# Patient Record
Sex: FEMALE | Race: WHITE | NOT HISPANIC OR LATINO | Employment: UNEMPLOYED | URBAN - METROPOLITAN AREA
[De-identification: names, ages, dates, MRNs, and addresses within clinical notes are randomized per-mention and may not be internally consistent; named-entity substitution may affect disease eponyms.]

---

## 2017-02-07 ENCOUNTER — TRANSFERRED RECORDS (OUTPATIENT)
Dept: HEALTH INFORMATION MANAGEMENT | Facility: CLINIC | Age: 32
End: 2017-02-07

## 2017-02-14 ENCOUNTER — RADIANT APPOINTMENT (OUTPATIENT)
Dept: ULTRASOUND IMAGING | Facility: CLINIC | Age: 32
End: 2017-02-14
Payer: COMMERCIAL

## 2017-02-14 ENCOUNTER — PRENATAL OFFICE VISIT (OUTPATIENT)
Dept: MIDWIFE SERVICES | Facility: CLINIC | Age: 32
End: 2017-02-14
Payer: COMMERCIAL

## 2017-02-14 VITALS
HEART RATE: 70 BPM | WEIGHT: 220.2 LBS | SYSTOLIC BLOOD PRESSURE: 121 MMHG | BODY MASS INDEX: 34.56 KG/M2 | DIASTOLIC BLOOD PRESSURE: 81 MMHG | HEIGHT: 67 IN

## 2017-02-14 DIAGNOSIS — O09.01 ENCOUNTER FOR SUPERVISION OF HIGH-RISK PREGNANCY WITH HISTORY OF INFERTILITY IN FIRST TRIMESTER: ICD-10-CM

## 2017-02-14 DIAGNOSIS — O36.80X0 ENCOUNTER TO DETERMINE FETAL VIABILITY OF PREGNANCY, NOT APPLICABLE OR UNSPECIFIED FETUS: ICD-10-CM

## 2017-02-14 DIAGNOSIS — O09.00: Primary | ICD-10-CM

## 2017-02-14 PROBLEM — O09.91 SUPERVISION OF HIGH RISK PREGNANCY IN FIRST TRIMESTER: Status: ACTIVE | Noted: 2017-02-14

## 2017-02-14 PROBLEM — Z34.01 ENCOUNTER FOR SUPERVISION OF NORMAL FIRST PREGNANCY IN FIRST TRIMESTER: Status: ACTIVE | Noted: 2017-02-14

## 2017-02-14 LAB
ALBUMIN UR-MCNC: NEGATIVE MG/DL
APPEARANCE UR: CLEAR
BACTERIA #/AREA URNS HPF: ABNORMAL /HPF
BASOPHILS # BLD AUTO: 0 10E9/L (ref 0–0.2)
BASOPHILS NFR BLD AUTO: 0.2 %
BILIRUB UR QL STRIP: NEGATIVE
COLOR UR AUTO: YELLOW
DIFFERENTIAL METHOD BLD: ABNORMAL
EOSINOPHIL # BLD AUTO: 0.3 10E9/L (ref 0–0.7)
EOSINOPHIL NFR BLD AUTO: 2.1 %
ERYTHROCYTE [DISTWIDTH] IN BLOOD BY AUTOMATED COUNT: 13.8 % (ref 10–15)
GLUCOSE UR STRIP-MCNC: NEGATIVE MG/DL
HCT VFR BLD AUTO: 41.9 % (ref 35–47)
HGB BLD-MCNC: 14.4 G/DL (ref 11.7–15.7)
HGB UR QL STRIP: ABNORMAL
KETONES UR STRIP-MCNC: NEGATIVE MG/DL
LEUKOCYTE ESTERASE UR QL STRIP: ABNORMAL
LYMPHOCYTES # BLD AUTO: 2.3 10E9/L (ref 0.8–5.3)
LYMPHOCYTES NFR BLD AUTO: 19.3 %
MCH RBC QN AUTO: 30.3 PG (ref 26.5–33)
MCHC RBC AUTO-ENTMCNC: 34.4 G/DL (ref 31.5–36.5)
MCV RBC AUTO: 88 FL (ref 78–100)
MICRO REPORT STATUS: NORMAL
MONOCYTES # BLD AUTO: 0.9 10E9/L (ref 0–1.3)
MONOCYTES NFR BLD AUTO: 7.3 %
NEUTROPHILS # BLD AUTO: 8.4 10E9/L (ref 1.6–8.3)
NEUTROPHILS NFR BLD AUTO: 71.1 %
NITRATE UR QL: NEGATIVE
NON-SQ EPI CELLS #/AREA URNS LPF: ABNORMAL /LPF
PH UR STRIP: 6 PH (ref 5–7)
PLATELET # BLD AUTO: 267 10E9/L (ref 150–450)
RBC # BLD AUTO: 4.76 10E12/L (ref 3.8–5.2)
RBC #/AREA URNS AUTO: ABNORMAL /HPF (ref 0–2)
SP GR UR STRIP: 1.02 (ref 1–1.03)
SPECIMEN SOURCE: NORMAL
URN SPEC COLLECT METH UR: ABNORMAL
UROBILINOGEN UR STRIP-ACNC: 0.2 EU/DL (ref 0.2–1)
WBC # BLD AUTO: 11.8 10E9/L (ref 4–11)
WBC #/AREA URNS AUTO: ABNORMAL /HPF (ref 0–2)
WET PREP SPEC: NORMAL

## 2017-02-14 PROCEDURE — 87086 URINE CULTURE/COLONY COUNT: CPT | Performed by: ADVANCED PRACTICE MIDWIFE

## 2017-02-14 PROCEDURE — 81001 URINALYSIS AUTO W/SCOPE: CPT | Performed by: ADVANCED PRACTICE MIDWIFE

## 2017-02-14 PROCEDURE — 87491 CHLMYD TRACH DNA AMP PROBE: CPT | Performed by: ADVANCED PRACTICE MIDWIFE

## 2017-02-14 PROCEDURE — 86780 TREPONEMA PALLIDUM: CPT | Performed by: ADVANCED PRACTICE MIDWIFE

## 2017-02-14 PROCEDURE — 83036 HEMOGLOBIN GLYCOSYLATED A1C: CPT | Performed by: ADVANCED PRACTICE MIDWIFE

## 2017-02-14 PROCEDURE — 76817 TRANSVAGINAL US OBSTETRIC: CPT | Performed by: OBSTETRICS & GYNECOLOGY

## 2017-02-14 PROCEDURE — 87210 SMEAR WET MOUNT SALINE/INK: CPT | Performed by: ADVANCED PRACTICE MIDWIFE

## 2017-02-14 PROCEDURE — 85025 COMPLETE CBC W/AUTO DIFF WBC: CPT | Performed by: ADVANCED PRACTICE MIDWIFE

## 2017-02-14 PROCEDURE — 87340 HEPATITIS B SURFACE AG IA: CPT | Performed by: ADVANCED PRACTICE MIDWIFE

## 2017-02-14 PROCEDURE — 86900 BLOOD TYPING SEROLOGIC ABO: CPT | Performed by: ADVANCED PRACTICE MIDWIFE

## 2017-02-14 PROCEDURE — 86901 BLOOD TYPING SEROLOGIC RH(D): CPT | Performed by: ADVANCED PRACTICE MIDWIFE

## 2017-02-14 PROCEDURE — 87389 HIV-1 AG W/HIV-1&-2 AB AG IA: CPT | Performed by: ADVANCED PRACTICE MIDWIFE

## 2017-02-14 PROCEDURE — 40000968 ZZHCL STATISTIC ZIKA VIRUS: Mod: 90 | Performed by: ADVANCED PRACTICE MIDWIFE

## 2017-02-14 PROCEDURE — 82306 VITAMIN D 25 HYDROXY: CPT | Performed by: ADVANCED PRACTICE MIDWIFE

## 2017-02-14 PROCEDURE — 86762 RUBELLA ANTIBODY: CPT | Performed by: ADVANCED PRACTICE MIDWIFE

## 2017-02-14 PROCEDURE — 36415 COLL VENOUS BLD VENIPUNCTURE: CPT | Performed by: ADVANCED PRACTICE MIDWIFE

## 2017-02-14 PROCEDURE — 86850 RBC ANTIBODY SCREEN: CPT | Performed by: ADVANCED PRACTICE MIDWIFE

## 2017-02-14 PROCEDURE — 99000 SPECIMEN HANDLING OFFICE-LAB: CPT | Performed by: ADVANCED PRACTICE MIDWIFE

## 2017-02-14 PROCEDURE — 87591 N.GONORRHOEAE DNA AMP PROB: CPT | Performed by: ADVANCED PRACTICE MIDWIFE

## 2017-02-14 PROCEDURE — 84443 ASSAY THYROID STIM HORMONE: CPT | Performed by: ADVANCED PRACTICE MIDWIFE

## 2017-02-14 PROCEDURE — 99207 ZZC PRENATAL VISIT: CPT | Performed by: ADVANCED PRACTICE MIDWIFE

## 2017-02-14 RX ORDER — PROGESTERONE 50 MG/ML
INJECTION, SOLUTION INTRAMUSCULAR DAILY
COMMUNITY
End: 2017-03-17

## 2017-02-14 ASSESSMENT — ANXIETY QUESTIONNAIRES
IF YOU CHECKED OFF ANY PROBLEMS ON THIS QUESTIONNAIRE, HOW DIFFICULT HAVE THESE PROBLEMS MADE IT FOR YOU TO DO YOUR WORK, TAKE CARE OF THINGS AT HOME, OR GET ALONG WITH OTHER PEOPLE: SOMEWHAT DIFFICULT
3. WORRYING TOO MUCH ABOUT DIFFERENT THINGS: NOT AT ALL
1. FEELING NERVOUS, ANXIOUS, OR ON EDGE: SEVERAL DAYS
5. BEING SO RESTLESS THAT IT IS HARD TO SIT STILL: NOT AT ALL
6. BECOMING EASILY ANNOYED OR IRRITABLE: SEVERAL DAYS
7. FEELING AFRAID AS IF SOMETHING AWFUL MIGHT HAPPEN: NOT AT ALL
GAD7 TOTAL SCORE: 2
2. NOT BEING ABLE TO STOP OR CONTROL WORRYING: NOT AT ALL

## 2017-02-14 ASSESSMENT — PATIENT HEALTH QUESTIONNAIRE - PHQ9: 5. POOR APPETITE OR OVEREATING: NOT AT ALL

## 2017-02-14 NOTE — PATIENT INSTRUCTIONS
Remedies for nausea and vomiting with pregnancy      Eat small frequent meals every 2-3 hours if possible.       Avoid food at extremes of temperature and drinks with carbonation.      Eat foods that appeal to you, avoiding fats and spicy foods.      Avoid liquids with foods.  Drink liquids 30-60 minutes before or after eating and sip slowly.      Bread, pasta, crackers, potatoes, and rice tend to be tolerated the best.      Don't worry about what you eat in the first 3 months, it is more important that you can eat and keep it down.       Try flat ginger ale or ginger tea      Before rising in the morning, eat a small amount of crackers or dry toast.      Peppermint tea      Ginger is a herbal remedy for nausea and you can use it in any form.  There are ginger tablets you can purchase.  The dose 1000 mg a day in divided doses.       You may also try doxylamine (Unisom) 12.5 mg three times a day which is a sleeping medication along with Vitamin B6 25 mg three times a day.  This combination takes up to a week to work so give it some time.       Benadryl (diphenhydramine) 25-50 mg every 8 hour or Dramamine (dimenhydrinate)  mg by mouth every 4-6 hours. Both of these medication may cause some drowsiness      Other things that may help include an Accupressure band and acupuncture      If these methods fail there are many prescriptions that we can try    If you begin to vomit more than 5 or 6 times a day and feel that you are unable to keep anything down, call the [facility] at [phone]    Over-the-counter (OTC) medications during pregnancy    Make sure to follow package directions for dosing and information unless otherwise noted on the list.    Morning sickness/nausea:      Unisom (doxylamine) 12.5 mg (1/2 tab) and Vitamin B6 25-50 mg three times daily. Unisom may cause some drowsiness as it is typically used for sleep. The combination of these medications can be very effective but they can also  be taken separately    Dramamine (Dimenhydrinate) 25-50 mg every 4-6 hours as needed    Benadryl (diphenhydramine) 25-50 mg every 4-6 hours     Ginger tablets 1000 mg per day in divided doses    Constipation:      Colace (Docusate sodium)    Metamucil    Citrucel    Milk of magnesia    Fibercon    Miralax (if all other methods have failed)    Diarrhea:    Imodium (loperamide)    Heartburn:      Zantac (ranitidine)    Pepcid (famotidine)    Prilosec (omeprazole)    Antacids-Tums, Maalox (liquid or tablets), Rolaids  Pepto Bismol and Jayda Bairoil are NOT RECOMMENDED for use during pregnancy because they contain aspirin    Hemorrhoids:      Tucks pads/ointment    Anusol/Anusol-HC    Preparation H    Gas Pain  Simethicone (Gas-X, Mylanta Gas, Mylicon)      Cough, cold, and congestion:      Robitussin (dextromethorphan) and Robitussin DM (dextromethorphan and guaifenesin)    Cough drops/zinc lozenges    Mucinex    Sudafed (after 16 weeks gestation)    Vicks Vapo rub  Cough medicine with alcohol like Nyquil is not recommended during pregnancy    Headache:      Acetaminophen (Tylenol) 650 mg every 4-6 hours or 1000 mg every 6 hours, do not exceed 4000 mg in 24 hours   Ibuprofen (Advil/Motrin) and Naproxen (Aleve) are not recommended during the 1st or 3rd trimester of pregnancy    Allergy:      Benadryl (diphenhydramine)    Zyrtec (cetirizine)    Claritin (loratadine)    Rash/Itching:      Benadryl lotion    Cortaid cream (Hydrocortisone cream)    Benadryl (diphenhydramine)    Vaginal yeast infection:      Monistat 3 or 7 day    Gyne-Lotrimin    Acne:      Benzoyl Peroxide    Salicylic acid     If you have questions or concerns about any medications that are available OTC or you are unsure if something is safe call:    [facility]  [phone]      .  Fish Safety During Pregnancy    Often time's women worry about eating fish during pregnancy. Fish can be totally safe to eat during pregnancy.However, some  fish may contain contaminants that could harm you or your baby if you eat certain types of fish or eat fish too often. Below is a list of which types of fish to eat, how often to eat fish, and which types of fish to avoid while pregnant.    Reasons to eat fish:    Fish is a great source of protein, vitamins, and minerals.    The oils found in fish are important to unborn and breast fed babies    Eating fish may play a role in the prevention of heart disease in adults       Fish to EAT while pregnant   2 servings per week of any of these types of fish    Catfish (farm raised)  Cod    Crab    Soto    Flatfish   Oysters    [place]   [place]     Sardines   Scallops    Shrimp   Tilapia   1 serving per week of any of these fish    Canned LIGHT tuna     [place] caught-        Stu Pattersonppie   Mesa    Yellow Perch   1 serving per MONTH of these types of fish    Canned WHITE tuna  T[place]wanese Sea Brown    Grouper   Halibdomi Tapialin    [place]ers Roughy    Tuna steak   [place] caught-    Bass    Catfish    Walleye smaller than 20 inches    [place] smaller than 30 inches         Servings of fish should be based on your weight, 1 oz for every 20 lbs of body weight. For example: 130 lb person can safely eat 7 oz     150 lb person can safely eat 8 oz     170 lb person can safely eat 9 oz      Make sure to space out meals with fish throughout the month, don't eat all your fish meals for the month within a few days.       Fish to Avoid while Pregnant:      Shark   Dane Mackerel    Swordfish  Raw Sushi-any type of fish    Tile fish         [place] Caught:      Walleye longer than 20 in    [place] longer than 30 in    Musky      Contaminants:      Mercury comes from air pollution and small amounts of mercury can damage the brain that is just starting to form or grow. Too much mercury may affect a child's behavior and lead to learning problems later in life. Too much mercury in adults and older  children may cause tingling, numbness in hands and feet, or vision changes    PCBs a man-made substance once used in electrical transformers but was banned in 1967 although it can still be found in the [place]. A baby who are exposed to PCBs during pregnancy may have a lower birth weight, reduced head size, and delayed physical development. Exposure to PCBs may also cause cancer    PFOS is a man-made chemical to make products that resist heat, oil, stains, and grease. Studies in lab animals exposed to low levels of PFOS showed decreas HDL (good cholesterol) and changes in thyroid hormone levels. The concern about PFOS is with long-term exposure such as consuming large amounts over a long period of time     Mercury and PFOS cannot be removed through cooking or cleaning. By removing fat when cleaning and cooking you can reduce PCBs.      For more information and up to date information about fish safety in [place] please contact the [place] Department of Health ([facility]) or the Department of Natural Resources (DNR):    www.[email or web address].[facility].mn.  DNR: [phone]  [facility]: [phone]      Recommendations for total and rate of weight gain during pregnancy, by prepregnancy BMI    Total weight gain Rates of weight gain*  2nd and 3rd trimester   Prepregnancy BMI Range in kg Range in lbs Mean (range) in kg/week Mean (range) in lbs/week   Underweight (<18.5 kg/m2) 12.5-18 28-40 0.51 (0.44-0.58) 1 (1-1.3)   Normal weight (18.5-24.9 kg/m2) 11.5-16 25-35 0.42 (0.35-0.50) 1 (0.8-1)   Overweight (25.0-29.9 kg/m2) 7-11.5 15-25 0.28 (0.23-0.33) 0.6 (0.5-0.7)   Obese (?30.0 kg/m2) 5-9 11-20 0.22 (0.17-0.27) 0.5 (0.4-0.6)   BMI: body mass index.  * Calculations assume a 0.5-2 kg (1.1-4.4 lbs) weight gain in the first trimester.  * To calculate BMI go to [email or web address]

## 2017-02-14 NOTE — MR AVS SNAPSHOT
After Visit Summary   2/14/2017    Nhi Alexander    MRN: [id number]           Patient Information     Date Of Birth          1985        Visit Information        Provider Department      2/14/2017 2:00 PM Cheyenne Abernathy APRN CNM; [facility]        Today's Diagnoses     Encounter for supervision of high-risk pregnancy with history of infertility    -  1    Encounter for supervision of high-risk pregnancy with history of infertility in first trimester          Care Instructions    Remedies for nausea and vomiting with pregnancy      Eat small frequent meals every 2-3 hours if possible.       Avoid food at extremes of temperature and drinks with carbonation.      Eat foods that appeal to you, avoiding fats and spicy foods.      Avoid liquids with foods.  Drink liquids 30-60 minutes before or after eating and sip slowly.      Bread, pasta, crackers, potatoes, and rice tend to be tolerated the best.      Don't worry about what you eat in the first 3 months, it is more important that you can eat and keep it down.       Try flat ginger ale or ginger tea      Before rising in the morning, eat a small amount of crackers or dry toast.      Peppermint tea      Ginger is a herbal remedy for nausea and you can use it in any form.  There are ginger tablets you can purchase.  The dose 1000 mg a day in divided doses.       You may also try doxylamine (Unisom) 12.5 mg three times a day which is a sleeping medication along with Vitamin B6 25 mg three times a day.  This combination takes up to a week to work so give it some time.       Benadryl (diphenhydramine) 25-50 mg every 8 hour or Dramamine (dimenhydrinate)  mg by mouth every 4-6 hours. Both of these medication may cause some drowsiness      Other things that may help include an Accupressure band and acupuncture      If these methods fail there are many prescriptions that we can try    If you begin to vomit more  than 5 or 6 times a day and feel that you are unable to keep anything down, call the [facility] at [phone]    Over-the-counter (OTC) medications during pregnancy    Make sure to follow package directions for dosing and information unless otherwise noted on the list.    Morning sickness/nausea:      Unisom (doxylamine) 12.5 mg (1/2 tab) and Vitamin B6 25-50 mg three times daily. Unisom may cause some drowsiness as it is typically used for sleep. The combination of these medications can be very effective but they can also be taken separately    Dramamine (Dimenhydrinate) 25-50 mg every 4-6 hours as needed    Benadryl (diphenhydramine) 25-50 mg every 4-6 hours     Ginger tablets 1000 mg per day in divided doses    Constipation:      Colace (Docusate sodium)    Metamucil    Citrucel    Milk of magnesia    Fibercon    Miralax (if all other methods have failed)    Diarrhea:    Imodium (loperamide)    Heartburn:      Zantac (ranitidine)    Pepcid (famotidine)    Prilosec (omeprazole)    Antacids-Tums, Maalox (liquid or tablets), Rolaids  Pepto Bismol and Jayda [place] are NOT RECOMMENDED for use during pregnancy because they contain aspirin    Hemorrhoids:      Tucks pads/ointment    Anusol/Anusol-HC    Preparation H    Gas Pain  Simethicone (Gas-X, Mylanta Gas, Mylicon)      Cough, cold, and congestion:      Robitussin (dextromethorphan) and Robitussin DM (dextromethorphan and guaifenesin)    Cough drops/zinc lozenges    Mucinex    Sudafed (after 16 weeks gestation)    Vicks Vapo rub  Cough medicine with alcohol like Nyquil is not recommended during pregnancy    Headache:      Acetaminophen (Tylenol) 650 mg every 4-6 hours or 1000 mg every 6 hours, do not exceed 4000 mg in 24 hours   Ibuprofen (Advil/Motrin) and Naproxen (Aleve) are not recommended during the 1st or 3rd trimester of pregnancy    Allergy:      Benadryl (diphenhydramine)    Zyrtec (cetirizine)    Claritin  (loratadine)    Rash/Itching:      Benadryl lotion    Cortaid cream (Hydrocortisone cream)    Benadryl (diphenhydramine)    Vaginal yeast infection:      Monistat 3 or 7 day    Gyne-Lotrimin    Acne:      Benzoyl Peroxide    Salicylic acid     If you have questions or concerns about any medications that are available OTC or you are unsure if something is safe call:    [facility]  [phone]      .  Fish Safety During Pregnancy    Often time's women worry about eating fish during pregnancy. Fish can be totally safe to eat during pregnancy.However, some fish may contain contaminants that could harm you or your baby if you eat certain types of fish or eat fish too often. Below is a list of which types of fish to eat, how often to eat fish, and which types of fish to avoid while pregnant.    Reasons to eat fish:    Fish is a great source of protein, vitamins, and minerals.    The oils found in fish are important to unborn and breast fed babies    Eating fish may play a role in the prevention of heart disease in adults       Fish to EAT while pregnant   2 servings per week of any of these types of fish    Catfish (farm raised)  Cod    Crab    Soto    Flatfish   Oysters    [place]   [place] ([place], [place]     Sardines   Scallops    Shrimp   Tilapia   1 serving per week of any of these fish    Canned LIGHT tuna     [place] caught-        Sunliam Pattersonppie   [place]    Yellow Perch   1 serving per MONTH of these types of fish    Canned WHITE tuna  [place] Sea Brown    Grouper   Anayeli Coates    [place] Roughy    Tuna steak   [place] caught-    Bass    Catfish    Walleye smaller than 20 inches    [place] smaller than 30 inches         Servings of fish should be based on your weight, 1 oz for every 20 lbs of body weight. For example: 130 lb person can safely eat 7 oz     150 lb person can safely eat 8 oz     170 lb person can safely eat 9 oz      Make sure to space out meals with fish  throughout the month, don't eat all your fish meals for the month within a few days.       Fish to Avoid while Pregnant:      Shark   Dane Mackerel    Swordfish  Raw Sushi-any type of fish    Tile fish         [place] Caught:      Walleye longer than 20 in    [place] longer than 30 in    [place]      Contaminants:      Mercury comes from air pollution and small amounts of mercury can damage the brain that is just starting to form or grow. Too much mercury may affect a child's behavior and lead to learning problems later in life. Too much mercury in adults and older children may cause tingling, numbness in hands and feet, or vision changes    PCBs a man-made substance once used in electrical transformers but was banned in 1967 although it can still be found in the [place] and [place]. A baby who are exposed to PCBs during pregnancy may have a lower birth weight, reduced head size, and delayed physical development. Exposure to PCBs may also cause cancer    PFOS is a man-made chemical to make products that resist heat, oil, stains, and grease. Studies in lab animals exposed to low levels of PFOS showed decreas HDL (good cholesterol) and changes in thyroid hormone levels. The concern about PFOS is with long-term exposure such as consuming large amounts over a long period of time     Mercury and PFOS cannot be removed through cooking or cleaning. By removing fat when cleaning and cooking you can reduce PCBs.      For more information and up to date information about fish safety in [place] please contact the [place] Department of Health ([facility]) or the Department of Natural Resources (DNR):    [email or web address].mn.  DNR: [phone]  [facility]: [phone]      Recommendations for total and rate of weight gain during pregnancy, by prepregnancy BMI    Total weight gain Rates of weight gain*  2nd and 3rd trimester   Prepregnancy BMI Range in kg Range in lbs Mean (range) in kg/week Mean (range) in lbs/week  "  Underweight (<18.5 kg/m2) 12.5-18 28-40 0.51 (0.44-0.58) 1 (1-1.3)   Normal weight (18.5-24.9 kg/m2) 11.5-16 25-35 0.42 (0.35-0.50) 1 (0.8-1)   Overweight (25.0-29.9 kg/m2) 7-11.5 15-25 0.28 (0.23-0.33) 0.6 (0.5-0.7)   Obese (?30.0 kg/m2) 5-9 11-20 0.22 (0.17-0.27) 0.5 (0.4-0.6)   BMI: body mass index.  * Calculations assume a 0.5-2 kg (1.1-4.4 lbs) weight gain in the first trimester.  * To calculate BMI go to [email or web address]              Follow-ups after your visit        Future tests that were ordered for you today     Open Future Orders        Priority Expected Expires Ordered    MFM Read Screen Fetal Echo Single Routine  3/8/2018 5/8/2017            Who to contact     If you have questions or need follow up information about today's clinic visit or your schedule please contact [facility] directly at [phone].  Normal or non-critical lab and imaging results will be communicated to you by Roomixerhart, letter or phone within 4 business days after the clinic has received the results. If you do not hear from us within 7 days, please contact the clinic through Roomixerhart or phone. If you have a critical or abnormal lab result, we will notify you by phone as soon as possible.  Submit refill requests through [facility]. or call your pharmacy and they will forward the refill request to us. Please allow 3 business days for your refill to be completed.          Additional Information About Your Visit        Carmudit Information     [facility]. lets you send messages to your doctor, view your test results, renew your prescriptions, schedule appointments and more. To sign up, go to [email or web address]. . Click on \"Log in\" on the left side of the screen, which will take you to the Welcome page. Then click on \"Sign up Now\" on the right side of the page.     You will be asked to enter the access code listed below, as well as some personal information. Please follow the directions to create your username " "and password.     Your access code is: [email or web address]  Expires: 5/15/2017  4:58 PM     Your access code will  in 90 days. If you need help or a new code, please call your [facility] clinic or [phone].        Care EveryWhere ID     This is your Care EveryWhere ID. This could be used by other organizations to access your [facility] medical records  [id number]        Your Vitals Were     Pulse Height Last Period BMI (Body Mass Index)          70 5' 6.5\" (1.689 m) 2016 35.01 kg/m2         Blood Pressure from Last 3 Encounters:   17 128/78   17 108/66   17 124/76    Weight from Last 3 Encounters:   17 235 lb (106.6 kg)   17 230 lb (104.3 kg)   17 228 lb (103.4 kg)              We Performed the Following     ABO/Rh type and screen     Anti Treponema     CBC with platelets differential     Chlamydia trachomatis PCR     Hemoglobin A1c     Hepatitis B surface antigen     HIV Antigen Antibody Combo     Neisseria gonorrhoeae PCR     Rubella Antibody IgG Quantitative     TSH with free T4 reflex     UA with Microscopic     Urine Culture Aerobic Bacterial     Vitamin D Deficiency     Wet prep     Zika Virus        Primary Care Provider    None       No address on file        Thank you!     Thank you for choosing [facility]  for your care. Our goal is always to provide you with excellent care. Hearing back from our patients is one way we can continue to improve our services. Please take a few minutes to complete the written survey that you may receive in the mail after your visit with us. Thank you!             Your Updated Medication List - Protect others around you: Learn how to safely use, store and throw away your medicines at www.disposemymeds.org.          This list is accurate as of: 17 11:59 PM.  Always use your most recent med list.                   Brand Name Dispense Instructions for use    PRENATAL VITAMIN PO      Take by mouth daily         "

## 2017-02-14 NOTE — NURSING NOTE
[facility] Obstetrical Risk History    Patient presents for new OB labs and teaching.      1. Please indicate any condition you have or have had in the past:  Infertility  2. Abnormal Pap  3. Do you smoke?  No  If yes, how many packs/day?   4. Do you drink alcoholic beverages? No  If yes, how often?  What type?   5. List any medications taken since your last period: Prenatal Vitamins, Estrace, Progesterone  6. List any recreational drugs (cocaine, marijuana, etc. used since your last period:None    7. List any chemical or radiation exposure that you've encountered: None  8. Are you on a restricted diet? No  If yes, please describe:  Do you have any Latter day objections to any form of treatment? No    GENETIC SCREENING    These questions apply to you, the baby's father, or anyone in either family with:    1. Patient's age 35 or greater at delivery? No  2. Irish, Upper sorbian, Mediterranean ancestry? No  3. Neural Tube Defect (Meningomyelocele, Spina Bifida, or Anencephaly)? No  4. Restorationist, Cape Verdean Botswanan or history of Demetrius-Sachs disease? No  5. Down's Syndrome?   No  6. Hemophilia or clotting disorder? No  7. Muscular Dystrophy? No  8. Cystic Fibrosis? No  9. Pipo's Chorea? No  10. Mental Retardation? No  11. 3 or more miscarriages or a stillborn? No  12. Other inherited disease or chromosomal disorder? No  13. Have you or the baby's father had a child born with a birth defect? No  14. Did you or the baby's father have a birth defect yourselves? No    Do you have any other concerns about birth defects? No

## 2017-02-14 NOTE — PROGRESS NOTES
"Nhi Alexander is a 32 year old  ,  who is not a previous CNM patient. She presents for a new OB Visit. This was a planned pregnancy with a history of infertiliy. Currently taking Estrace and progesterone until 10 weeks gestation.   FOB is Frederic Red who is in good health.  FOB is actively involved in relationship and this pregnancy.       She has had bleeding since her LMP.  Brown spotting around 5 1/2 weeks gestation. Late in 6th week, had few gushes of bright red watery blood that \"would have soaked a panti-liner.\"  No further spotting since then.   She had had cramping abdominal pain since her LMP.  She has had nausea.  Vomiting twice since pregnancy  Any personal or family history of blood clots? No  History of sickle cell anemia or trait? No         Patient's last menstrual period was 2016.  Estimated Date of Delivery: Sep 28, 2017 Ultrasound consistent with LMP.    MENSTRUAL HISTORY    frequency: every 28 days  Last PAP:    History of abnormal Pap?  Yes:     Health maintenance updated:  yes        Current medications are:    Current Outpatient Prescriptions:      Estradiol (ESTRACE PO), Take 2 mg by mouth 3 times daily, Disp: , Rfl:      progesterone, in sesame oil, 50 MG/ML injection, Inject into the muscle daily, Disp: , Rfl:      Prenatal Vit-Fe Fumarate-FA (PRENATAL VITAMIN PO), Take by mouth daily, Disp: , Rfl:      oxyCODONE-acetaminophen (PERCOCET) 5-325 MG per tablet, Take 1-2 tablets by mouth every 4 hours as needed for pain (moderate to severe) (Patient not taking: Reported on 2017), Disp: 30 tablet, Rfl: 0     NO ACTIVE MEDICATIONS, ., Disp: , Rfl:      INFECTION HISTORY  HIV: No  Hepatitis B: No  Hepatitis C: No  Tuberculosis: No  Last PPD Unsure  Genital Herpes self: no  Herpes partner:  no  Chlamydia:  no  Gonorrhea:  no  HPV: No  BV:  No  Syphilis:  No  Chicken Pox:  Yes - As a child      OB HISTORY  Obstetric History       T0      TAB0  "  SAB0   E0   M0   L0       # Outcome Date GA Lbr Holden/2nd Weight Sex Delivery Anes PTL Lv   1 Current                   PERSONAL/SOCIAL HISTORY  Exercise Habits:  Usually does yoga and walking few days per week.  Employment: Full time.  Her job involves moderate activity with no potential for toxic exposure.    Travel plans:  None planned. Was tested for Zika virus early in pregnancy, results negative. Requesting retest for Zika today   Diet: eats regular meals and takes daily vitamins   Abuse concerns? No  Hgb A1c screen:  BMI > 30: Yes, 1st degree family DM: No, History of GDM: No, PCOS: No, High risk ethnicity: Yes    She  reports that she has never smoked. She does not have any smokeless tobacco history on file.    STD testing offered?  Accepted  Last PHQ-9 score on record =   PHQ-9 SCORE 2/14/2017   Total Score 4     Last GAD7 score on record =   PATRICA-7 SCORE 2/14/2017   Total Score 2     Alcohol Score = 0      PAST MEDICAL/SURGICAL HISTORY  Past Medical History   Diagnosis Date     Abdominal pain, unspecified abdominal location 8/11/2009      Problem list name updated by automated process. Provider to review     CARDIOVASCULAR SCREENING; LDL GOAL LESS THAN 160 10/31/2010     Chest pain 3/11/2015     feels like chest wall pain,       Dysmenorrhea      Infertility, female      NO ACTIVE PROBLEMS      Papanicolaou smear of vagina with low grade squamous intraepithelial lesion (LGSIL) 12/8/2008     Past Surgical History   Procedure Laterality Date     No history of surgery       Dilation and curettage, operative hysteroscopy with morcellator, combined N/A 11/5/2015     Procedure: COMBINED DILATION AND CURETTAGE, OPERATIVE HYSTEROSCOPY WITH MORCELLATOR;  Surgeon: Kimberly Suarez MD;  Location:  OR     Laparoscopy diagnostic (gyn) N/A 11/5/2015     Procedure: LAPAROSCOPY DIAGNOSTIC (GYN);  Surgeon: Kimberly Suarez MD;  Location:  OR     Laparoscopic tubal dye study Bilateral 11/5/2015     Procedure: LAPAROSCOPIC  "TUBAL DYE STUDY;  Surgeon: Kimberly Suarez MD;  Location: [facility] OR     Davinci pelvic procedure N/A 2015     Procedure: DAVINCI XI PELVIC PROCEDURE;  Surgeon: Kimberly Suarez MD;  Location: [facility] OR     Davinci lysis of adhesions N/A 2015     Procedure: DAVINCI LYSIS OF ADHESIONS;  Surgeon: Kimberly Suarez MD;  Location: [facility] OR       FAMILY HISTORY  Family History   Problem Relation Age of Onset     Gynecology Maternal Grandmother      endometriosis, hyst done age31's (approx)     Gynecology Maternal Aunt      endometriosis, hyst done age 30's (approx)     Coronary Artery Disease No family hx of      Hypertension No family hx of      DIABETES No family hx of      CEREBROVASCULAR DISEASE No family hx of          ROS:  12 point review of systems negative other than symptoms noted below.      PHYSICAL EXAM  Vitals: /81  Pulse 70  Ht 1.689 m (5' 6.5\")  Wt 99.9 kg (220 lb 3.2 oz)  LMP 2016  BMI 35.01 kg/m2  BMI= Body mass index is 35.01 kg/(m^2).     GENERAL:  32 year old pleasant pregnant female, alert, cooperative and well groomed.  NECK:  Thyroid without enlargement and nodules.  Lymph nodes not palpable.   LUNGS:  Clear to auscultation.  BREAST:  Symmetrical without lesions or nodes.  Nipples everted.  Areolas symmetric.  No palpable axillary nodes.  HEART:  RRR without murmur.  ABDOMEN: Soft without masses or tenderness.  No scars noted..  GENITALIA: BUS without tenderness or inflammation.  Perineum without lesions.    VAGINA:  Pink, normal rugae and discharge.  CERVIX:  Anterior and Midline, smooth, without discharge, and firm/ closed.  UTERUS: Anteverted and Retroverted, nontender 8 weeks in size.  ADNEXA: Without masses or tenderness  RECTAL:  Normal appearance.  Digital exam deferred.  LOWER EXTREMITIES: No edema. No significant varicosities.    ASSESSMENT/PLAN:    IUP at 7w5d  HOANG present on US 1.7 X .6 X .5 cm       consult for US for AMA patients: No   Genetic Testing reviewed and " discussed, patient unsure. Handout provided      COUNSELING    Instructed on use of triage nurse line and contacting the on call CNM after hours in an emergency.     Symptoms of N&V and fatigue usually start to resolve around 12-16 weeks     Reviewed CNM philosophy, call schedule for labor and delivery, and [facility] for delivery    1st OB handout given outlining appointment spacing and CNM information    Reviewed exercise and nutrition    Recommend to gain 11-20 pounds with her pregnancy.    Discussed OTC medications. OB med list given    Encouraged patient to take PNV's/DHA    Travel precautions discussed. Call placed to MD during this visit to inquire if retesting for Zika is required. Initially, was told no. Call back from [facility] shortly after stating, yes Nhi meets criteria for retesting given the time testing was done initially.     Will call patient with 1st lab results when available.    Does patient desire a RN home visit from the [facility]?  No         Will return to the clinic in 4-6 weeks for her next routine prenatal check.  Will call to be seen sooner if problems arise.    Cheyenne MCNEIL CNM

## 2017-02-15 LAB
ABO + RH BLD: NORMAL
ABO + RH BLD: NORMAL
BACTERIA SPEC CULT: NORMAL
BLD GP AB SCN SERPL QL: NORMAL
BLOOD BANK CMNT PATIENT-IMP: NORMAL
DEPRECATED CALCIDIOL+CALCIFEROL SERPL-MC: 26 UG/L (ref 20–75)
HBA1C MFR BLD: 4.9 % (ref 4.3–6)
HBV SURFACE AG SERPL QL IA: NONREACTIVE
HIV 1+2 AB+HIV1 P24 AG SERPL QL IA: NORMAL
MICRO REPORT STATUS: NORMAL
SPECIMEN EXP DATE BLD: NORMAL
SPECIMEN SOURCE: NORMAL
T PALLIDUM IGG+IGM SER QL: NEGATIVE
TSH SERPL DL<=0.005 MIU/L-ACNC: 2.02 MU/L (ref 0.4–4)

## 2017-02-15 ASSESSMENT — ANXIETY QUESTIONNAIRES: GAD7 TOTAL SCORE: 2

## 2017-02-15 ASSESSMENT — PATIENT HEALTH QUESTIONNAIRE - PHQ9: SUM OF ALL RESPONSES TO PHQ QUESTIONS 1-9: 4

## 2017-02-16 LAB
C TRACH DNA SPEC QL NAA+PROBE: NORMAL
N GONORRHOEA DNA SPEC QL NAA+PROBE: NORMAL
RUBV IGG SERPL IA-ACNC: 125 IU/ML
SPECIMEN SOURCE: NORMAL
SPECIMEN SOURCE: NORMAL

## 2017-02-17 ENCOUNTER — TELEPHONE (OUTPATIENT)
Dept: MIDWIFE SERVICES | Facility: CLINIC | Age: 32
End: 2017-02-17

## 2017-02-17 DIAGNOSIS — Z53.9 ERRONEOUS ENCOUNTER--DISREGARD: ICD-10-CM

## 2017-02-17 DIAGNOSIS — E55.9 VITAMIN D INSUFFICIENCY: Primary | ICD-10-CM

## 2017-02-17 NOTE — PROGRESS NOTES
Please call patient to discuss lab results. All are normal. However, Vitamin D level is on the lower side at 26. Recommendation is to supplement with Vit. D3 with 4000 iu/daily OTC and have her level rechecked at the 28 week OB visit. Her Zika results are still pending. Also, please inform pt. her subchorionic hemorrhage present on her 1st trimester US will be re evaluated at her 20 week US scan.     Cheyenne MCNEIL CNM

## 2017-02-23 ENCOUNTER — TELEPHONE (OUTPATIENT)
Dept: NURSING | Facility: CLINIC | Age: 32
End: 2017-02-23

## 2017-02-23 DIAGNOSIS — Z13.79 GENETIC SCREENING: Primary | ICD-10-CM

## 2017-02-23 NOTE — TELEPHONE ENCOUNTER
Pt called back and wants Innatal ordered. She will have it drawn when she comes for her next prenatal visit at 12 weeks. Order entered.

## 2017-02-23 NOTE — TELEPHONE ENCOUNTER
Unfortunately we do not offer ultrasounds between the first OB US and the 20 week US unless there is a medical indication.  We do listen to baby's heartbeat at each prenatal visit with our doppler, so she may find reassurance from that.      Gladys Wilson, DNP, APRN, CNM

## 2017-02-23 NOTE — TELEPHONE ENCOUNTER
9w0d, MCIHELLE 9/28/17. Pt has a question regarding a nuchal translucency screening for her pregnancy. Pt asked if she would be referred out to another clinic or should she find a clinic to do the translucency on her own. Pt informed that this is not done at this clinic. We offer the [employer] Innatal testing instead. Pt informed there had been too many false positive with the nuchal translucency ultrasound. Pt asked of she could have an US earlier than 20wks because this is a IVF pregnancy and wants one mid way between the 1st OB US and the 20 wk US. Routing to Gladys Wilson. Please advise. Pt does not have a next OB appointment scheduled.

## 2017-02-24 ENCOUNTER — TELEPHONE (OUTPATIENT)
Dept: MIDWIFE SERVICES | Facility: CLINIC | Age: 32
End: 2017-02-24

## 2017-03-01 ENCOUNTER — OFFICE VISIT (OUTPATIENT)
Dept: FAMILY MEDICINE | Facility: CLINIC | Age: 32
End: 2017-03-01
Payer: COMMERCIAL

## 2017-03-01 ENCOUNTER — OFFICE VISIT (OUTPATIENT)
Dept: MIDWIFE SERVICES | Facility: CLINIC | Age: 32
End: 2017-03-01
Payer: COMMERCIAL

## 2017-03-01 VITALS
TEMPERATURE: 98.5 F | HEIGHT: 67 IN | BODY MASS INDEX: 34.84 KG/M2 | WEIGHT: 222 LBS | SYSTOLIC BLOOD PRESSURE: 124 MMHG | HEART RATE: 72 BPM | DIASTOLIC BLOOD PRESSURE: 78 MMHG

## 2017-03-01 VITALS
RESPIRATION RATE: 18 BRPM | BODY MASS INDEX: 35 KG/M2 | OXYGEN SATURATION: 98 % | HEART RATE: 81 BPM | HEIGHT: 67 IN | SYSTOLIC BLOOD PRESSURE: 124 MMHG | DIASTOLIC BLOOD PRESSURE: 77 MMHG | TEMPERATURE: 99.1 F | WEIGHT: 223 LBS

## 2017-03-01 DIAGNOSIS — Z33.1 PREGNANT STATE, INCIDENTAL: ICD-10-CM

## 2017-03-01 DIAGNOSIS — B34.9 VIRAL ILLNESS: Primary | ICD-10-CM

## 2017-03-01 DIAGNOSIS — O21.9 NAUSEA AND VOMITING DURING PREGNANCY: Primary | ICD-10-CM

## 2017-03-01 DIAGNOSIS — E55.9 VITAMIN D INSUFFICIENCY: ICD-10-CM

## 2017-03-01 DIAGNOSIS — O09.01 ENCOUNTER FOR SUPERVISION OF HIGH-RISK PREGNANCY WITH HISTORY OF INFERTILITY IN FIRST TRIMESTER: ICD-10-CM

## 2017-03-01 DIAGNOSIS — Z3A.09 9 WEEKS GESTATION OF PREGNANCY: ICD-10-CM

## 2017-03-01 LAB
FLUAV+FLUBV AG SPEC QL: NEGATIVE
FLUAV+FLUBV AG SPEC QL: NORMAL
SPECIMEN SOURCE: NORMAL

## 2017-03-01 PROCEDURE — 99212 OFFICE O/P EST SF 10 MIN: CPT | Performed by: NURSE PRACTITIONER

## 2017-03-01 PROCEDURE — 87804 INFLUENZA ASSAY W/OPTIC: CPT | Mod: 91 | Performed by: NURSE PRACTITIONER

## 2017-03-01 PROCEDURE — 99213 OFFICE O/P EST LOW 20 MIN: CPT | Performed by: ADVANCED PRACTICE MIDWIFE

## 2017-03-01 NOTE — MR AVS SNAPSHOT
"              After Visit Summary   3/1/2017    Nhi Alexander    MRN: [id number]           Patient Information     Date Of Birth          1985        Visit Information        Provider Department      3/1/2017 10:15 AM Nathaly Puckett APRN CNP [facility]        Today's Diagnoses     Viral illness    -  1    Pregnant state, incidental           Follow-ups after your visit        Who to contact     If you have questions or need follow up information about today's clinic visit or your schedule please contact [facility] directly at [phone].  Normal or non-critical lab and imaging results will be communicated to you by [employer], letter or phone within 4 business days after the clinic has received the results. If you do not hear from us within 7 days, please contact the clinic through [employer] or phone. If you have a critical or abnormal lab result, we will notify you by phone as soon as possible.  Submit refill requests through [employer] or call your pharmacy and they will forward the refill request to us. Please allow 3 business days for your refill to be completed.          Additional Information About Your Visit        MyChart Information     [employer] lets you send messages to your doctor, view your test results, renew your prescriptions, schedule appointments and more. To sign up, go to [email or web address] . Click on \"Log in\" on the left side of the screen, which will take you to the Welcome page. Then click on \"Sign up Now\" on the right side of the page.     You will be asked to enter the access code listed below, as well as some personal information. Please follow the directions to create your username and password.     Your access code is: [email or web address]  Expires: 5/15/2017  3:58 PM     Your access code will  in 90 days. If you need help or a new code, please call your [facility] or [phone].        Care EveryWhere ID     This is your Care EveryWhere ID. This " "could be used by other organizations to access your [place] medical records  [id number]        Your Vitals Were     Pulse Temperature Respirations Height Last Period Pulse Oximetry    81 99.1  F (37.3  C) (Tympanic) 18 5' 6.5\" (1.689 m) 12/22/2016 98%    BMI (Body Mass Index)                   35.45 kg/m2            Blood Pressure from Last 3 Encounters:   03/01/17 124/77   03/01/17 124/78   02/14/17 121/81    Weight from Last 3 Encounters:   03/01/17 223 lb (101.2 kg)   03/01/17 222 lb (100.7 kg)   02/14/17 220 lb 3.2 oz (99.9 kg)              We Performed the Following     Influenza A/B antigen        Primary Care Provider    None       No address on file        Thank you!     Thank you for choosing [facility]  for your care. Our goal is always to provide you with excellent care. Hearing back from our patients is one way we can continue to improve our services. Please take a few minutes to complete the written survey that you may receive in the mail after your visit with us. Thank you!             Your Updated Medication List - Protect others around you: Learn how to safely use, store and throw away your medicines at www.disposemymeds.org.          This list is accurate as of: 3/1/17 11:01 AM.  Always use your most recent med list.                   Brand Name Dispense Instructions for use    PRENATAL VITAMIN PO      Take by mouth daily       progesterone (in sesame oil) 50 MG/ML injection      Inject into the muscle daily         "

## 2017-03-01 NOTE — PROGRESS NOTES
"  SUBJECTIVE:                                                   Nhi Alexander is a 32 year old who presents to clinic today for the following health issue(s):  Patient presents with:  Prenatal Care: has had nausea and vomiting, but now has constant body aches and a headache and increased nausea/vomiting.      HPI:  Marilee has felt very sick since yesterday morning. Denies fever and cough but reports body aches, dizziness. \"Feels like a train hit me.\"     Patient's last menstrual period was 2016.  Menstrual History: patient is currently pregnant  Patient is sexually active  .  Health maintenance updated:  yes  STI infx testing offered:  Accepted    Last PHQ-9 score on record =   PHQ-9 SCORE 2017   Total Score 4     Last GAD7 score on record =   PATRICA-7 SCORE 2017   Total Score 2     Alcohol Score = 0    Problem list and histories reviewed & adjusted, as indicated.  Additional history: as documented.    Patient Active Problem List   Diagnosis     Encounter for supervision of high-risk pregnancy with history of infertility in first trimester     Vitamin D insufficiency     Past Surgical History   Procedure Laterality Date     No history of surgery       Dilation and curettage, operative hysteroscopy with morcellator, combined N/A 2015     Procedure: COMBINED DILATION AND CURETTAGE, OPERATIVE HYSTEROSCOPY WITH MORCELLATOR;  Surgeon: Kimberly Suarez MD;  Location:  OR     Laparoscopy diagnostic (gyn) N/A 2015     Procedure: LAPAROSCOPY DIAGNOSTIC (GYN);  Surgeon: Kimberly Suarez MD;  Location:  OR     Laparoscopic tubal dye study Bilateral 2015     Procedure: LAPAROSCOPIC TUBAL DYE STUDY;  Surgeon: Kimberly Suarez MD;  Location: [facility]     Davinci pelvic procedure N/A 2015     Procedure: DAVINCI XI PELVIC PROCEDURE;  Surgeon: Kimberly Suarez MD;  Location: [facility] OR     Davinci lysis of adhesions N/A 2015     Procedure: DAVINCI LYSIS OF ADHESIONS;  Surgeon: Erick" "MD Kimberly;  Location:  OR      Social History   Substance Use Topics     Smoking status: Never Smoker     Smokeless tobacco: Never Used     Alcohol use No      Problem (# of Occurrences) Relation (Name,Age of Onset)    Family History Negative (2) Mother, Father    Gynecology (2) Maternal Grandmother: endometriosis, hyst done age29's (approx), Maternal Aunt: endometriosis, hyst done age 30's (approx)       Negative family history of: Coronary Artery Disease, Hypertension, DIABETES, CEREBROVASCULAR DISEASE            Current Outpatient Prescriptions   Medication Sig     progesterone, in sesame oil, 50 MG/ML injection Inject into the muscle daily     Prenatal Vit-Fe Fumarate-FA (PRENATAL VITAMIN PO) Take by mouth daily     No current facility-administered medications for this visit.      No Known Allergies    ROS:  12 point review of systems negative other than symptoms noted below.  Constitutional: Fatigue and Loss of Appetite  Head: Nasal Congestion  Cardiovascular: Lightheadedness  Respiratory: Shortness of Breath  Gastrointestinal: Nausea and Vomiting  Neurologic: Headaches  Musculoskeletal: Joint Pain    OBJECTIVE:     /78  Pulse 72  Temp 98.5  F (36.9  C) (Oral)  Ht 5' 6.5\" (1.689 m)  Wt 222 lb (100.7 kg)  LMP 12/22/2016  BMI 35.29 kg/m2  Body mass index is 35.29 kg/(m^2).    PHYSICAL EXAM:  Constitutional:  Appearance: Well nourished, well developed alert, in no acute distress     In-Clinic Test Results:  No results found for this or any previous visit (from the past 24 hour(s)).    ASSESSMENT/PLAN:                                                        ICD-10-CM    1. Nausea and vomiting during pregnancy O21.9    2. Encounter for supervision of high-risk pregnancy with history of infertility in first trimester O09.01    3. Vitamin D insufficiency E55.9    4. 9 weeks gestation of pregnancy Z3A.09        COUNSELING:  Recommend evaluation for influenza A at urgent care or clinic   Discussed hydration and " nausea remedies  Will try the unisom and vitamin b6 combination, has only been doing   Discussed trying caffeine for HA/julio cesar tea in the afternoon when nausea hits  Recommend f/u and trying prescriptions medications if no improvement by next week'  Letter given to work from home until she stops vomiting as frequently  return to clinic in 2 weeks for normal prenatal visit    15 minutes was spent face to face with the patient today discussing her history, diagnosis, and follow-up plan as noted above. Over 50% of the visit was spent in counseling and coordination of care.    Total Visit Time: 15 minutes.     EWA Oliva, CNM

## 2017-03-01 NOTE — LETTER
March 1, 2017      Nhi Alexander  [address]  [place] [address]        To whom it may concern,      Please be advised that Nhi was seen in our clinic today for nausea and vomiting, she is currently 9w6d pregnant and is starting a new medication regime to help with nausea/vomiting. Until nausea/vomiting improves she may need to work from home as she has been vomiting up to 4x per day. Nausea typically resolves around 12-16 weeks. Please call us with any questions or concerns.       Sincerely,          EWA Oliva, KIMBERLY  Your [facility] Care Team

## 2017-03-01 NOTE — PROGRESS NOTES
HPI      SUBJECTIVE:                                                    Nhi Alexander is a 32 year old female who presents to clinic today for the following health issues:    RESPIRATORY SYMPTOMS      Duration: yesterday     Description  facial pain/pressure, chills, headache, fatigue/malaise, myalgias and nausea    Severity: moderate    Accompanying signs and symptoms: No fever; No cough; Vomiting 10 weeks pregnant    History (predisposing factors):  none    Precipitating or alleviating factors: None    Therapies tried and outcome:  Tylenol        Started yesterday with body aches and fatigue  Vomited 4 times last night       Past Medical History   Diagnosis Date     Abdominal pain, unspecified abdominal location 8/11/2009      Problem list name updated by automated process. Provider to review     CARDIOVASCULAR SCREENING; LDL GOAL LESS THAN 160 10/31/2010     Chest pain 3/11/2015     feels like chest wall pain,       Dysmenorrhea      Infertility, female      NO ACTIVE PROBLEMS      Papanicolaou smear of vagina with low grade squamous intraepithelial lesion (LGSIL) 12/8/2008     Past Surgical History   Procedure Laterality Date     No history of surgery       Dilation and curettage, operative hysteroscopy with morcellator, combined N/A 11/5/2015     Procedure: COMBINED DILATION AND CURETTAGE, OPERATIVE HYSTEROSCOPY WITH MORCELLATOR;  Surgeon: Kimberly Saurez MD;  Location:  OR     Laparoscopy diagnostic (gyn) N/A 11/5/2015     Procedure: LAPAROSCOPY DIAGNOSTIC (GYN);  Surgeon: Kimberly Saurez MD;  Location:  OR     Laparoscopic tubal dye study Bilateral 11/5/2015     Procedure: LAPAROSCOPIC TUBAL DYE STUDY;  Surgeon: Kimberly Suarez MD;  Location:  OR     Davinci pelvic procedure N/A 11/5/2015     Procedure: DAVINCI XI PELVIC PROCEDURE;  Surgeon: Kimberly Suarez MD;  Location: [facility] OR     Davinci lysis of adhesions N/A 11/5/2015     Procedure: DAVINCI LYSIS OF ADHESIONS;  Surgeon: Kimberly Suarez MD;   "Location:  OR     Social History   Substance Use Topics     Smoking status: Never Smoker     Smokeless tobacco: Never Used     Alcohol use No     Current Outpatient Prescriptions   Medication Sig Dispense Refill     progesterone, in sesame oil, 50 MG/ML injection Inject into the muscle daily       Prenatal Vit-Fe Fumarate-FA (PRENATAL VITAMIN PO) Take by mouth daily       No Known Allergies    Reviewed PMH, med list and allergies.      ROS  Detailed as above       /77 (BP Location: Right arm, Patient Position: Right side, Cuff Size: Adult Regular)  Pulse 81  Temp 99.1  F (37.3  C) (Tympanic)  Resp 18  Ht 5' 6.5\" (1.689 m)  Wt 223 lb (101.2 kg)  LMP 12/22/2016  SpO2 98%  BMI 35.45 kg/m2      Physical Exam   Constitutional: She is well-developed, well-nourished, and in no distress.   HENT:   Head: Normocephalic.   Right Ear: Tympanic membrane, external ear and ear canal normal.   Left Ear: Tympanic membrane, external ear and ear canal normal.   Mouth/Throat: Oropharynx is clear and moist. No oropharyngeal exudate.   Eyes: Conjunctivae are normal.   Neck: Normal range of motion.   Cardiovascular: Normal rate, regular rhythm and normal heart sounds.    No murmur heard.  Pulmonary/Chest: Effort normal and breath sounds normal. No respiratory distress.   Lymphadenopathy:     She has cervical adenopathy.   Neurological: She is alert.   Skin: Skin is warm and dry.   Psychiatric: Mood and affect normal.   Vitals reviewed.      Assessment and Plan:       ICD-10-CM    1. Viral illness B34.9    2. Upper respiratory infection J06.9 Influenza A/B antigen   3. Pregnant state, incidental Z33.1        Neg flu swab  Likely viral   Recommend Symptomatic treatments   Call or rtc with any concerns. Or call OB with any pregnancy concerns        Nathaly Puckett, APRN, CNP  [facility]    "

## 2017-03-01 NOTE — MR AVS SNAPSHOT
"              After Visit Summary   3/1/2017    Nih Alexander    MRN: [id number]           Patient Information     Date Of Birth          1985        Visit Information        Provider Department      3/1/2017 9:10 AM Lori Lee APRN CNM [facility]        Today's Diagnoses     Nausea and vomiting during pregnancy    -  1    Encounter for supervision of high-risk pregnancy with history of infertility in first trimester        Vitamin D insufficiency        9 weeks gestation of pregnancy           Follow-ups after your visit        Follow-up notes from your care team     Return in about 2 weeks (around 3/15/2017).      Who to contact     If you have questions or need follow up information about today's clinic visit or your schedule please contact [facility] directly at [phone].  Normal or non-critical lab and imaging results will be communicated to you by MyChart, letter or phone within 4 business days after the clinic has received the results. If you do not hear from us within 7 days, please contact the clinic through MyChart or phone. If you have a critical or abnormal lab result, we will notify you by phone as soon as possible.  Submit refill requests through [employer] or call your pharmacy and they will forward the refill request to us. Please allow 3 business days for your refill to be completed.          Additional Information About Your Visit        MyChart Information     [employer] lets you send messages to your doctor, view your test results, renew your prescriptions, schedule appointments and more. To sign up, go to [email or web address] . Click on \"Log in\" on the left side of the screen, which will take you to the Welcome page. Then click on \"Sign up Now\" on the right side of the page.     You will be asked to enter the access code listed below, as well as some personal information. Please follow the directions to create your username and " "password.     Your access code is: [email or web address]  Expires: 5/15/2017  3:58 PM     Your access code will  in 90 days. If you need help or a new code, please call your [place] clinic or [phone].        Care EveryWhere ID     This is your Care EveryWhere ID. This could be used by other organizations to access your [place] medical records  [id number]        Your Vitals Were     Pulse Temperature Height Last Period BMI (Body Mass Index)       72 98.5  F (36.9  C) (Oral) 5' 6.5\" (1.689 m) 2016 35.29 kg/m2        Blood Pressure from Last 3 Encounters:   17 124/77   17 124/78   17 121/81    Weight from Last 3 Encounters:   17 223 lb (101.2 kg)   17 222 lb (100.7 kg)   17 220 lb 3.2 oz (99.9 kg)              Today, you had the following     No orders found for display       Primary Care Provider    None       No address on file        Thank you!     Thank you for choosing [facility] [place]  for your care. Our goal is always to provide you with excellent care. Hearing back from our patients is one way we can continue to improve our services. Please take a few minutes to complete the written survey that you may receive in the mail after your visit with us. Thank you!             Your Updated Medication List - Protect others around you: Learn how to safely use, store and throw away your medicines at www.disposemymeds.org.          This list is accurate as of: 3/1/17 10:54 AM.  Always use your most recent med list.                   Brand Name Dispense Instructions for use    PRENATAL VITAMIN PO      Take by mouth daily       progesterone (in sesame oil) 50 MG/ML injection      Inject into the muscle daily         "

## 2017-03-01 NOTE — NURSING NOTE
"Chief Complaint   Patient presents with     URI       Initial /77 (BP Location: Right arm, Patient Position: Right side, Cuff Size: Adult Regular)  Pulse 81  Temp 99.1  F (37.3  C) (Tympanic)  Resp 18  Ht 5' 6.5\" (1.689 m)  Wt 223 lb (101.2 kg)  LMP 12/22/2016  SpO2 98%  BMI 35.45 kg/m2 Estimated body mass index is 35.45 kg/(m^2) as calculated from the following:    Height as of this encounter: 5' 6.5\" (1.689 m).    Weight as of this encounter: 223 lb (101.2 kg).  Medication Reconciliation: martin Argueta CMA (AAMA)      "

## 2017-03-09 LAB — ZIKA VIRUS: NORMAL

## 2017-03-17 ENCOUNTER — TRANSFERRED RECORDS (OUTPATIENT)
Dept: HEALTH INFORMATION MANAGEMENT | Facility: CLINIC | Age: 32
End: 2017-03-17

## 2017-03-17 ENCOUNTER — PRENATAL OFFICE VISIT (OUTPATIENT)
Dept: MIDWIFE SERVICES | Facility: CLINIC | Age: 32
End: 2017-03-17
Payer: COMMERCIAL

## 2017-03-17 VITALS — BODY MASS INDEX: 36.25 KG/M2 | DIASTOLIC BLOOD PRESSURE: 76 MMHG | WEIGHT: 228 LBS | SYSTOLIC BLOOD PRESSURE: 124 MMHG

## 2017-03-17 DIAGNOSIS — O09.01 ENCOUNTER FOR SUPERVISION OF HIGH-RISK PREGNANCY WITH HISTORY OF INFERTILITY IN FIRST TRIMESTER: ICD-10-CM

## 2017-03-17 DIAGNOSIS — Z13.79 GENETIC SCREENING: ICD-10-CM

## 2017-03-17 PROCEDURE — 99000 SPECIMEN HANDLING OFFICE-LAB: CPT | Performed by: ADVANCED PRACTICE MIDWIFE

## 2017-03-17 PROCEDURE — 36415 COLL VENOUS BLD VENIPUNCTURE: CPT | Performed by: ADVANCED PRACTICE MIDWIFE

## 2017-03-17 PROCEDURE — 40000791 ZZHCL STATISTIC VERIFI PRENATAL TRISOMY 21,18,13: Mod: 90 | Performed by: ADVANCED PRACTICE MIDWIFE

## 2017-03-17 PROCEDURE — 99207 ZZC PRENATAL VISIT: CPT | Performed by: ADVANCED PRACTICE MIDWIFE

## 2017-03-17 NOTE — MR AVS SNAPSHOT
"              After Visit Summary   3/17/2017    Nhi Alexander    MRN: [id number]           Patient Information     Date Of Birth          1985        Visit Information        Provider Department      3/17/2017 9:20 AM Cheyenne Abernathy APRN CNM [facility]        Today's Diagnoses     Encounter for supervision of high-risk pregnancy with history of infertility in first trimester        Genetic screening           Follow-ups after your visit        Follow-up notes from your care team     Return in about 4 weeks (around 4/14/2017) for Prenatal Visit.      Future tests that were ordered for you today     Open Future Orders        Priority Expected Expires Ordered    [place] [facility] Read Screen Fetal Echo Single Routine  3/8/2018 5/8/2017            Who to contact     If you have questions or need follow up information about today's clinic visit or your schedule [facility] at [phone].  Normal or non-critical lab and imaging results will be communicated to you by MyChart, letter or phone within 4 business days after the clinic has received the results. If you do not hear from us within 7 days, please contact the clinic through [employer] or phone. If you have a critical or abnormal lab result, we will notify you by phone as soon as possible.  Submit refill requests through [employer] or call your pharmacy and they will forward the refill request to us. Please allow 3 business days for your refill to be completed.          Additional Information About Your Visit        MyChart Information     [employer] lets you send messages to your doctor, view your test results, renew your prescriptions, schedule appointments and more. To sign up, go to [email or web address] . Click on \"Log in\" on the left side of the screen, which will take you to the Welcome page. Then click on \"Sign up Now\" on the right side of the page.     You will be asked to enter the access code " listed below, as well as some personal information. Please follow the directions to create your username and password.     Your access code is: [email or web address]  Expires: 5/15/2017  4:58 PM     Your access code will  in 90 days. If you need help or a new code, please call your [place] clinic or [phone].        Care EveryWhere ID     This is your Care EveryWhere ID. This could be used by other organizations to access your [place] medical records  [id number]        Your Vitals Were     Last Period BMI (Body Mass Index)                2016 36.25 kg/m2           Blood Pressure from Last 3 Encounters:   17 128/78   17 108/66   17 124/76    Weight from Last 3 Encounters:   17 235 lb (106.6 kg)   17 230 lb (104.3 kg)   17 228 lb (103.4 kg)              We Performed the Following     Prenatal trisomy 21,18,13        Primary Care Provider    None       No address on file        Thank you!     Thank you for choosing [facility] [place]  for your care. Our goal is always to provide you with excellent care. Hearing back from our patients is one way we can continue to improve our services. Please take a few minutes to complete the written survey that you may receive in the mail after your visit with us. Thank you!             Your Updated Medication List - Protect others around you: Learn how to safely use, store and throw away your medicines at www.disposemymeds.org.          This list is accurate as of: 3/17/17 11:59 PM.  Always use your most recent med list.                   Brand Name Dispense Instructions for use    PRENATAL VITAMIN PO      Take by mouth daily

## 2017-03-17 NOTE — PROGRESS NOTES
Patient feels well overall. NVP is starting to subside. Was taking the vit b6/Unisom combo. Unisom made her too sleepy, now just vit b6  Patient has had nausea and has had vomiting  Concerned about weight gain. Educated about diet and exercise and normal weight gain. Plans to begin exercising now that NVP is subsiding.   Discussed genetic screening; will be done today. Single AFP can be drawn after 16 weeks if desired  Would like copy of negative Zika virus results from MD. Unable to find at time of visit. Will mail a copy when able to find results in chart.     Return to clinic 4 weeks    Cheyenne MCNEIL CNM

## 2017-03-23 ENCOUNTER — TELEPHONE (OUTPATIENT)
Dept: NURSING | Facility: CLINIC | Age: 32
End: 2017-03-23

## 2017-03-23 DIAGNOSIS — O09.01 ENCOUNTER FOR SUPERVISION OF HIGH-RISK PREGNANCY WITH HISTORY OF INFERTILITY IN FIRST TRIMESTER: ICD-10-CM

## 2017-03-23 NOTE — TELEPHONE ENCOUNTER
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

## 2017-04-05 ENCOUNTER — TELEPHONE (OUTPATIENT)
Dept: NURSING | Facility: CLINIC | Age: 32
End: 2017-04-05

## 2017-04-05 NOTE — TELEPHONE ENCOUNTER
14w6d, MICHELLE 9/28/17. Pt wanted to know where she should schedule her 20 week US. Informed pt that the US is done here at this clinic.  Transferred to scheduling to make appointment.

## 2017-04-14 ENCOUNTER — PRENATAL OFFICE VISIT (OUTPATIENT)
Dept: MIDWIFE SERVICES | Facility: CLINIC | Age: 32
End: 2017-04-14
Payer: COMMERCIAL

## 2017-04-14 VITALS — WEIGHT: 230 LBS | SYSTOLIC BLOOD PRESSURE: 108 MMHG | BODY MASS INDEX: 36.57 KG/M2 | DIASTOLIC BLOOD PRESSURE: 66 MMHG

## 2017-04-14 DIAGNOSIS — Z36.1 NEED FOR MATERNAL SERUM ALPHA-PROTEIN (MSAFP) SCREENING: Primary | ICD-10-CM

## 2017-04-14 DIAGNOSIS — O09.02 ENCOUNTER FOR SUPERVISION OF HIGH-RISK PREGNANCY WITH HISTORY OF INFERTILITY IN SECOND TRIMESTER: ICD-10-CM

## 2017-04-14 DIAGNOSIS — E55.9 VITAMIN D INSUFFICIENCY: ICD-10-CM

## 2017-04-14 DIAGNOSIS — Z3A.16 16 WEEKS GESTATION OF PREGNANCY: ICD-10-CM

## 2017-04-14 PROCEDURE — 99207 ZZC PRENATAL VISIT: CPT | Performed by: NURSE PRACTITIONER

## 2017-04-14 PROCEDURE — 99000 SPECIMEN HANDLING OFFICE-LAB: CPT | Performed by: NURSE PRACTITIONER

## 2017-04-14 PROCEDURE — 36415 COLL VENOUS BLD VENIPUNCTURE: CPT | Performed by: NURSE PRACTITIONER

## 2017-04-14 PROCEDURE — 82105 ALPHA-FETOPROTEIN SERUM: CPT | Mod: 90 | Performed by: NURSE PRACTITIONER

## 2017-04-14 NOTE — MR AVS SNAPSHOT
After Visit Summary   4/14/2017    Nhi Alexander    MRN: [id number]           Patient Information     Date Of Birth          1985        Visit Information        Provider Department      4/14/2017 8:30 AM Celeste Cadet APRN [facility]        Today's Diagnoses     Need for maternal serum alpha-protein (MSAFP) screening    -  1    Vitamin D insufficiency        Encounter for supervision of high-risk pregnancy with history of infertility in second trimester        16 weeks gestation of pregnancy          Care Instructions                   Diet During Pregnancy  In this discussion you will learn why you need a well-balanced diet while you are pregnant and what foods you should eat. You will also find out foods you should avoid and foods that will help some of the unpleasant side effects of pregnancy.   What foods do I need to eat?   Eating regular, well-balanced meals is more important when you are pregnant than at any other time of your life. What you eat provides food for your baby as well as yourself. The best time to begin eating a healthy, balanced diet is before you become pregnant.   You need about 300 more food calories a day than when you were not pregnant. Your healthcare provider will suggest a range of weight that you should gain. The usual recommended gain is about 20 to 35 pounds.   Your need for protein while pregnant is about 60 grams (g) a day. Many women already eat this amount or more of protein daily when they are not pregnant. However, if you are vegetarian or eat little meat or dairy, you may not be getting enough protein in your diet. You also need more vitamins and minerals, especially folic acid and iron. These nutrients are important for your baby's growth and development. They give your baby strong bones and teeth, healthy skin, and a healthy body.   Foods that are excellent sources of protein and vitamins are:   beans and peas   nuts    peanut butter   eggs   meat   fish   poultry   cheese, milk, and yogurt   Good sources of folic acid (also called folate) are:   leafy green vegetables, such as nba greens, spinach, kale, and mustard greens   broccoli   asparagus   fortified breakfast cereals and grains   beans   oranges and strawberries   yellow squash   tomato juice   Foods rich in iron are:   lean red meats, pork, chicken, and fish   fortified cereals   dried fruit   leafy green vegetables   beans   eggs   liver   kidneys   whole-grain or enriched bread   If you need advice on what foods to eat for a healthy, balanced diet, ask your healthcare provider to refer you to a dietician. If you need financial help buying nutritious foods, a government program called the Special Supplemental Food Program for Women, Infants, and Children (WIC) can help you buy foods like milk, eggs, cheese, and bread.   How do I know if I am eating a balanced diet?   Eat a variety of whole, fresh foods. Use the following as a guideline for what you should eat every day.   Meat, poultry, fish, beans, or eggs   You need 2 to 3 servings every day.   One serving of meat is 2 to 3 ounces (oz) of lean meat, poultry, or fish.   Single servings of other foods in this food group are 1 cup cooked beans, 2 eggs, 4 egg whites, 1/2 cup tofu, 1/2 cup nuts, or 1/4 cup peanut butter. Note that nuts and peanut butter, although healthy, are very high in calories and should be eaten in moderation, especially if you are gaining more weight than your healthcare provider recommends.   Grains, rice, pasta, bread   It is good to have 6 to 9 servings every day.   One serving is 1/2 cup pasta, 1/2 cup cooked cereal, or 1 slice of bread.   Choose less-processed, higher-fiber whole grains more often.   Fruits   You need 3 or more servings of fruits every day.   One serving of fruit is 1 medium apple, 1 medium banana, 1/2 cup chopped fruit, or 3/4 cup fruit juice.   Vegetables   You need 3 or  more servings of vegetables every day.   In general, 1 cup of cooked or raw vegetables or vegetable juice or 2 cups of raw leafy vegetables would be considered a serving.   Milk, cheese, or yogurt   You need 3 to 4 servings every day.   One serving is 1 cup of milk, 1 cup of yogurt, 1 and 1/2 ounces of hard cheese, or 2 ounces of processed cheese. It's best to choose low-fat or nonfat dairy products.   What if I am gaining more weight than my provider recommends?   Keep eating the recommended servings for all of the food groups, but make lower fat choices.   Avoid high-fat, high-sugar treats and high-calorie drinks, such as soda pop or large servings of juice.   Get enough exercise at the level your healthcare provider recommends.   For a more individualized approach to meal planning during your pregnancy, go to MyPyramid Plan for Moms at http://www.mypyramid.gov/mypyramidmoms/pyramidmoms_plan.aspx  Remedies for nausea and vomiting with pregnancy      Eat small frequent meals every 2-3 hours if possible.       Avoid food at extremes of temperature and drinks with carbonation.      Eat foods that appeal to you, avoiding fats and spicy foods.      Avoid liquids with foods.  Drink liquids 30-60 minutes before or after eating and sip slowly.      Bread, pasta, crackers, potatoes, and rice tend to be tolerated the best.      Don't worry about what you eat in the first 3 months, it is more important that you can eat and keep it down.       Try flat ginger ale or ginger tea      Before rising in the morning, eat a small amount of crackers or dry toast.      Peppermint tea      Ginger is a herbal remedy for nausea and you can use it in any form.  There are ginger tablets you can purchase.  The dose 1000 mg a day in divided doses.       You may also try doxylamine (Unisom) 12.5 mg three times a day which is a sleeping medication along with Vitamin B6 25 mg three times a day.  This combination takes up to a week to work so  give it some time.       Benadryl (diphenhydramine) 25-50 mg every 8 hour or Dramamine (dimenhydrinate)  mg by mouth every 4-6 hours. Both of these medication may cause some drowsiness      Other things that may help include an Accupressure band and acupuncture      If these methods fail there are many prescriptions that we can try    If you begin to vomit more than 5 or 6 times a day and feel that you are unable to keep anything down, call the [facility] at [phone]        Follow-ups after your visit        Additional Services     MAT FETAL MED CTR REFERRAL-PREGNANCY       >> Patient may proceed with recommendations for further testing as directed by the Maternal Fetal Medicine Specialist >>    >> If requesting Fetal Echo: MFM will determine appropriate location for exam due to indication.    >> If requesting Lung Maturity Amnio:  If results indicate fetal lung maturity, induction or C/S is recommended within 36 hours.  Please schedule accordingly.     Dear Patient:   Please be aware that coverage of these services is subject to the terms and limitations of your health insurance plan.  Call member services at your health plan with any benefit or coverage questions.      Please bring the following to your appointment:    >>  Any x-rays, CTs or MRIs which have been performed.  Contact the facility where they were done to arrange for  prior to your scheduled appointment.  Any new CT, MRI or other procedures ordered by your specialist must be performed at a [place] facility or coordinated by your clinic's referral office.  >>  List of current medications   >>  This referral request   >>  Any documents/labs given to you for this referral                  Follow-up notes from your care team     Return for Prenatal visit.      Future tests that were ordered for you today     Open Future Orders        Priority Expected Expires Ordered    MFM Read Screen Fetal Echo Single Routine  3/8/2018  "2017            Who to contact     If you have questions or need follow up information about today's clinic visit or your schedule please contact [facility] directly at [phone].  Normal or non-critical lab and imaging results will be communicated to you by MyChart, letter or phone within 4 business days after the clinic has received the results. If you do not hear from us within 7 days, please contact the clinic through MyChart or phone. If you have a critical or abnormal lab result, we will notify you by phone as soon as possible.  Submit refill requests through [employer] or call your pharmacy and they will forward the refill request to us. Please allow 3 business days for your refill to be completed.          Additional Information About Your Visit        [employer] Information     [employer] lets you send messages to your doctor, view your test results, renew your prescriptions, schedule appointments and more. To sign up, go to [email or web address] . Click on \"Log in\" on the left side of the screen, which will take you to the Welcome page. Then click on \"Sign up Now\" on the right side of the page.     You will be asked to enter the access code listed below, as well as some personal information. Please follow the directions to create your username and password.     Your access code is: [email or web address]  Expires: 5/15/2017  4:58 PM     Your access code will  in 90 days. If you need help or a new code, please call your [place] clinic or [phone].        Care EveryWhere ID     This is your Care EveryWhere ID. This could be used by other organizations to access your [place] medical records  [id number]        Your Vitals Were     Last Period BMI (Body Mass Index)                2016 36.57 kg/m2           Blood Pressure from Last 3 Encounters:   17 128/78   17 108/66   17 124/76    Weight from Last 3 Encounters:   17 235 lb (106.6 kg)   17 230 lb " (104.3 kg)   03/17/17 228 lb (103.4 kg)              We Performed the Following     Alpha fetoprotein maternal screen     MAT FETAL MED CTR REFERRAL-PREGNANCY        Primary Care Provider    None       No address on file        Thank you!     Thank you for choosing [facility] [place]  for your care. Our goal is always to provide you with excellent care. Hearing back from our patients is one way we can continue to improve our services. Please take a few minutes to complete the written survey that you may receive in the mail after your visit with us. Thank you!             Your Updated Medication List - Protect others around you: Learn how to safely use, store and throw away your medicines at www.disposemymeds.org.          This list is accurate as of: 4/14/17 11:59 PM.  Always use your most recent med list.                   Brand Name Dispense Instructions for use    PRENATAL VITAMIN PO      Take by mouth daily

## 2017-04-14 NOTE — PATIENT INSTRUCTIONS
Diet During Pregnancy  In this discussion you will learn why you need a well-balanced diet while you are pregnant and what foods you should eat. You will also find out foods you should avoid and foods that will help some of the unpleasant side effects of pregnancy.   What foods do I need to eat?   Eating regular, well-balanced meals is more important when you are pregnant than at any other time of your life. What you eat provides food for your baby as well as yourself. The best time to begin eating a healthy, balanced diet is before you become pregnant.   You need about 300 more food calories a day than when you were not pregnant. Your healthcare provider will suggest a range of weight that you should gain. The usual recommended gain is about 20 to 35 pounds.   Your need for protein while pregnant is about 60 grams (g) a day. Many women already eat this amount or more of protein daily when they are not pregnant. However, if you are vegetarian or eat little meat or dairy, you may not be getting enough protein in your diet. You also need more vitamins and minerals, especially folic acid and iron. These nutrients are important for your baby's growth and development. They give your baby strong bones and teeth, healthy skin, and a healthy body.   Foods that are excellent sources of protein and vitamins are:   beans and peas   nuts   peanut butter   eggs   meat   fish   poultry   cheese, milk, and yogurt   Good sources of folic acid (also called folate) are:   leafy green vegetables, such as nba greens, spinach, kale, and mustard greens   broccoli   asparagus   fortified breakfast cereals and grains   beans   oranges and strawberries   yellow squash   tomato juice   Foods rich in iron are:   lean red meats, pork, chicken, and fish   fortified cereals   dried fruit   leafy green vegetables   beans   eggs   liver   kidneys   whole-grain or enriched bread   If you need advice on what foods to eat for a  healthy, balanced diet, ask your healthcare provider to refer you to a dietician. If you need financial help buying nutritious foods, a government program called the Special Supplemental Food Program for Women, Infants, and Children (WIC) can help you buy foods like milk, eggs, cheese, and bread.   How do I know if I am eating a balanced diet?   Eat a variety of whole, fresh foods. Use the following as a guideline for what you should eat every day.   Meat, poultry, fish, beans, or eggs   You need 2 to 3 servings every day.   One serving of meat is 2 to 3 ounces (oz) of lean meat, poultry, or fish.   Single servings of other foods in this food group are 1 cup cooked beans, 2 eggs, 4 egg whites, 1/2 cup tofu, 1/2 cup nuts, or 1/4 cup peanut butter. Note that nuts and peanut butter, although healthy, are very high in calories and should be eaten in moderation, especially if you are gaining more weight than your healthcare provider recommends.   Grains, rice, pasta, bread   It is good to have 6 to 9 servings every day.   One serving is 1/2 cup pasta, 1/2 cup cooked cereal, or 1 slice of bread.   Choose less-processed, higher-fiber whole grains more often.   Fruits   You need 3 or more servings of fruits every day.   One serving of fruit is 1 medium apple, 1 medium banana, 1/2 cup chopped fruit, or 3/4 cup fruit juice.   Vegetables   You need 3 or more servings of vegetables every day.   In general, 1 cup of cooked or raw vegetables or vegetable juice or 2 cups of raw leafy vegetables would be considered a serving.   Milk, cheese, or yogurt   You need 3 to 4 servings every day.   One serving is 1 cup of milk, 1 cup of yogurt, 1 and 1/2 ounces of hard cheese, or 2 ounces of processed cheese. It's best to choose low-fat or nonfat dairy products.   What if I am gaining more weight than my provider recommends?   Keep eating the recommended servings for all of the food groups, but make lower fat choices.   Avoid high-fat,  high-sugar treats and high-calorie drinks, such as soda pop or large servings of juice.   Get enough exercise at the level your healthcare provider recommends.   For a more individualized approach to meal planning during your pregnancy, go to MyPyramid Plan for Moms at http://www.mypyramid.gov/mypyramidmoms/pyramidmoms_plan.aspx  Remedies for nausea and vomiting with pregnancy      Eat small frequent meals every 2-3 hours if possible.       Avoid food at extremes of temperature and drinks with carbonation.      Eat foods that appeal to you, avoiding fats and spicy foods.      Avoid liquids with foods.  Drink liquids 30-60 minutes before or after eating and sip slowly.      Bread, pasta, crackers, potatoes, and rice tend to be tolerated the best.      Don't worry about what you eat in the first 3 months, it is more important that you can eat and keep it down.       Try flat ginger ale or ginger tea      Before rising in the morning, eat a small amount of crackers or dry toast.      Peppermint tea      Ginger is a herbal remedy for nausea and you can use it in any form.  There are ginger tablets you can purchase.  The dose 1000 mg a day in divided doses.       You may also try doxylamine (Unisom) 12.5 mg three times a day which is a sleeping medication along with Vitamin B6 25 mg three times a day.  This combination takes up to a week to work so give it some time.       Benadryl (diphenhydramine) 25-50 mg every 8 hour or Dramamine (dimenhydrinate)  mg by mouth every 4-6 hours. Both of these medication may cause some drowsiness      Other things that may help include an Accupressure band and acupuncture      If these methods fail there are many prescriptions that we can try    If you begin to vomit more than 5 or 6 times a day and feel that you are unable to keep anything down, call the [facility] at [phone]

## 2017-04-14 NOTE — PROGRESS NOTES
Feels well.  States nausea and vomiting is resolved.  Fetal movement No  Denies loss of fluid/vb/contractions/pelvic pain  Depression screening done  requested AFP today  Anatomy ultrasound next visit between 20-22 weeks; to be done at [facility] d/t increased BMI and IVF pregnancy  Return to clinic 4 weeks OB visit    Celeste MCNEIL CNM, TG[facility]

## 2017-04-17 ENCOUNTER — TELEPHONE (OUTPATIENT)
Dept: NURSING | Facility: CLINIC | Age: 32
End: 2017-04-17

## 2017-04-17 LAB
# FETUSES US: NORMAL
AFP ADJ MOM AMN: 1.32
AFP SERPL-MCNC: 33 NG/ML
AGE - REPORTED: 34.6
DATING METHOD: NORMAL
DIABETIC AT CONCEPTION: NO
FAMILY MEMBER DISEASES HX: NO
FAMILY MEMBER DISEASES HX: NO
GA METHOD: NORMAL
GA: 16.14 WK
HX OF HEREDITARY DISORDERS: NO
IDDM PATIENT QL: NO
INTEGRATED SCN PATIENT-IMP: NORMAL
LMP START DATE: NORMAL
PREV HX CHROMOSOME ABNORMALITY: NO
SPECIMEN DRAWN SERPL: NORMAL
TWINS: NORMAL

## 2017-04-17 NOTE — TELEPHONE ENCOUNTER
"Pt calling for her AFP results that were drawn on 4/14/17. Informed scheduling (Sarah) to inform pt that her results are \"In Process\" and once they are back we will call her. Sarah verbalized understanding.      Closing encounter.  "

## 2017-04-18 ENCOUNTER — TELEPHONE (OUTPATIENT)
Dept: NURSING | Facility: CLINIC | Age: 32
End: 2017-04-18

## 2017-04-18 NOTE — TELEPHONE ENCOUNTER
Called pt and informed of Dee Lee's note below. Pt stated understanding. Pt wondering her AFP results as well, informed results WNL. Pt stated understanding and had no further questions.

## 2017-04-18 NOTE — TELEPHONE ENCOUNTER
Please inform Nhi about round ligament pain, stretching discomfort as her belly gets bigger. May try heat, ice, bath, tylenol as directed for pain, and if pain does not improve with these comfort measures she should be seen today or tomorrow and evaluated for a UTI, may have a UTI despite no symptoms.     EWA Oliva, CNM

## 2017-04-18 NOTE — TELEPHONE ENCOUNTER
16w5d, MICHELLE 9/28/17. Pt is feeling cramping and lower abdominal pressure. Has been getting worse over the last week. Denies vaginal bleeding or LOF. Not able to time cramping cramping is constant. Hurts to bend down, sit down or leans forward. Pt is able to drink fluids. Is well hydrated. Pt does not have UTI symptoms. Pt states has more pain when bladder is full. Routing to Dee Lee. Plese advise.

## 2017-04-18 NOTE — TELEPHONE ENCOUNTER
Pt has tried applying a heating pad but continues to have lower abdominal pressure and pain. [facility] Pt informed that she could [facility] to a [facility]. She should tell the clinic that she is pregnant. Pt will call to schedule an appointment in the morning if she is still having pain.

## 2017-04-19 NOTE — PROGRESS NOTES
Please call patient and notify her that the AFP was negative for open neural tube defect for the baby.  Thanks!    Celeste MCNEIL, KIMBERLY, WHNP-BC  [facility]

## 2017-04-20 NOTE — TELEPHONE ENCOUNTER
"Pt went to [facility] walk in clinic and had ua/uc done. Pt states the UA came back normal and the prescribed her an abx anyway due to symptoms, pt not sure which one it was but stated it starts with an \"a\", pt did not fill it yet. Pt then states the provider called her back regarding her us and stated it came back with \"mixed brandon contamination\" which he informed her \"typically means did not get actual organism. And that she should continue with abx\" Pt is wondering if she should still take the antibiotic despite the negative ua/uc. Pt still having pain and states it is worse with movement and pressure and pain are worse at the end of the day. Pt states heating pad on low setting helps with the discomfort. Pt wanting to know if CNM would recommend taking the antibiotic. Note routed to CNM in clinic Celeste Cadet to review and advise.   "

## 2017-04-20 NOTE — TELEPHONE ENCOUNTER
At this time, if urine culture was negative for infection, then she does not need to take antibiotic.      As far as the pain and pressure, if the pain is intermittent, resolves with rest/heat/OTC tylenol, then it sounds like common discomforts of pregnancy.  If she develops any sharp pain or pain that is not resolved with rest/heat/OTC tylenol or develops any vaginal bleeding she should call the office back. Let me know if you have any other questions/concerns.      Thanks!  Celeste MCNEIL, KIMBERLY, WHNP-[facility]

## 2017-05-01 ENCOUNTER — PRE VISIT (OUTPATIENT)
Dept: MATERNAL FETAL MEDICINE | Facility: CLINIC | Age: 32
End: 2017-05-01

## 2017-05-01 DIAGNOSIS — Z98.891 S/P CESAREAN SECTION: Primary | ICD-10-CM

## 2017-05-08 ENCOUNTER — HOSPITAL ENCOUNTER (OUTPATIENT)
Dept: ULTRASOUND IMAGING | Facility: CLINIC | Age: 32
Discharge: HOME | End: 2017-05-08
Attending: NURSE PRACTITIONER | Admitting: NURSE PRACTITIONER
Payer: COMMERCIAL

## 2017-05-08 ENCOUNTER — PRENATAL OFFICE VISIT (OUTPATIENT)
Dept: MIDWIFE SERVICES | Facility: CLINIC | Age: 32
End: 2017-05-08
Payer: COMMERCIAL

## 2017-05-08 ENCOUNTER — OFFICE VISIT (OUTPATIENT)
Dept: MATERNAL FETAL MEDICINE | Facility: CLINIC | Age: 32
End: 2017-05-08
Attending: NURSE PRACTITIONER
Payer: COMMERCIAL

## 2017-05-08 VITALS — DIASTOLIC BLOOD PRESSURE: 78 MMHG | WEIGHT: 235 LBS | SYSTOLIC BLOOD PRESSURE: 128 MMHG | BODY MASS INDEX: 37.36 KG/M2

## 2017-05-08 DIAGNOSIS — O09.02 ENCOUNTER FOR SUPERVISION OF HIGH-RISK PREGNANCY WITH HISTORY OF INFERTILITY IN SECOND TRIMESTER: ICD-10-CM

## 2017-05-08 DIAGNOSIS — O09.812 PREGNANCY RESULTING FROM ASSISTED REPRODUCTIVE TECHNOLOGY, SECOND TRIMESTER: ICD-10-CM

## 2017-05-08 DIAGNOSIS — O26.90 PREGNANCY RELATED CONDITION, UNSPECIFIED TRIMESTER: ICD-10-CM

## 2017-05-08 DIAGNOSIS — Z3A.19 19 WEEKS GESTATION OF PREGNANCY: Primary | ICD-10-CM

## 2017-05-08 DIAGNOSIS — O99.212 OBESITY COMPLICATING PREGNANCY, SECOND TRIMESTER: ICD-10-CM

## 2017-05-08 DIAGNOSIS — E55.9 VITAMIN D INSUFFICIENCY: ICD-10-CM

## 2017-05-08 DIAGNOSIS — O09.522 AMA (ADVANCED MATERNAL AGE) MULTIGRAVIDA 35+, SECOND TRIMESTER: Primary | ICD-10-CM

## 2017-05-08 PROCEDURE — 99207 ZZC PRENATAL VISIT: CPT | Performed by: NURSE PRACTITIONER

## 2017-05-08 PROCEDURE — 76811 OB US DETAILED SNGL FETUS: CPT

## 2017-05-08 NOTE — MR AVS SNAPSHOT
After Visit Summary   5/8/2017    Nhi Alexander    MRN: [id number]           Patient Information     Date Of Birth          1985        Visit Information        Provider Department      5/8/2017 10:30 AM Celeste Cadet APRN CNM [facility]        Today's Diagnoses     19 weeks gestation of pregnancy    -  1    Vitamin D insufficiency        Encounter for supervision of high-risk pregnancy with history of infertility in second trimester        Obesity complicating pregnancy, second trimester          Care Instructions    Round Ligament Pain    Pregnancy can entail many normal discomforts. One of those discomforts may be round ligament pain. Round ligament pain occurs as your uterus and your baby grow and the muscles begin to stretch more in your abdomen. Round ligament pain is normal and not dangerous but can be very uncomfortable      Round ligament pain is typically described as an unpleasant sensation that ranges from a sharp knifelike pain to dull intermittent pain in the lower abdominal/suprapubic area of a pregnant woman      Virtually all pregnant women will experience this pain at some point during their pregnancies      It typically manifests between 16 and 20 weeks of pregnancy and can be incredibly bothersome especially for women who remain very active during their pregnancies       Please discuss with your midwife if you are having this type of pain; sometimes the pain can be associated with other medical conditions so it is important for us to assess you just to make sure    Comfort measures    There are certain things you can do to cope with round ligament pain and ease the discomfort you are having      Pregnancy support belt      Tylenol      Hot/ice packs      Baths       Exercise such as yoga and swimming that help stretch muscles      Prenatal massage      Reflexology to waist and pelvic joints       Positioning such as side-lying and hands and  knees, make sure your abdomen is  well supported with pillows while doing different positions    SIGNS OF  LABOR    Labor is  if it happens more than three weeks before your due date.    It can be hard to know if you are in labor, since the symptoms can be like the normal feelings of pregnancy.  Often, the only difference is the symptoms increase or they don't go away.     Signs of  labor can include:      Contractions which can feel like period cramps or gas pain.  You may feel it in the lower part of your abdomen, in your back, or as a pressure feeling in your bottom.  It is often regular, coming every 5 or 10 minutes, and  lasting about 30-60 seconds. Some contractions are normal during pregnancy (Sean fontaine contractions) but if you are feeling more than 5-6 in one hour that is NOT normal    If this occurs empty your bladder, then drink 2-3 glasses of water, eat a snack, and lay down on your left side. Put your hand on your abdomen to count the contractions.  If after one hour of resting you have still had 5-6 contractions call your clinic right away.      If you feel a pop, gush, or trickle of fluid it may mean that your bag of water has broken and you should contact the clinic       You may also experience loose stools and/or rectal pressure       Listen to your body, if something doesn't seem right please call us at the clinic    Risk Factors      Previous  delivery    Bacterial Vaginosis- if you notice a fishy smell to your discharge or experience vaginal itching/discomfort you should be evaluated for infection    Smoking    Drug abuse    Adolescent (teen) pregnancy or advanced maternal age (AMA) age 35 and over    Dehydration (this may not cause  labor but it can cause contractions)    If you think you are in  labor we may do some lab testing in the clinic or send you to the hospital for evaluation    Please call us if you are concerned you are in   labor.    [facility]  [phone]          Follow-ups after your visit        Additional Services     NUTRITION REFERRAL       Your provider has referred you to: [facility]: [place] [facility] [phone]   [email or web address]    Please be aware that coverage of these services is subject to the terms and limitations of your health insurance plan.  Call member services at your health plan with any benefit or coverage questions.      Please bring the following with you to your appointment:    (1) This referral request  (2) Any documents given to you regarding this referral  (3) Any specific questions you have about diet and/or food choices                  Follow-up notes from your care team     Return in about 4 weeks (around 6/5/2017) for Prenatal visit.      Future tests that were ordered for you today     Open Future Orders        Priority Expected Expires Ordered    MFM Read Screen Fetal Echo Single Routine  3/8/2018 5/8/2017            Who to contact     If you have questions or need follow up information about today's clinic visit or your schedule please contact [facility] directly at [phone].  Normal or non-critical lab and imaging results will be communicated to you by MyChart, letter or phone within 4 business days after the clinic has received the results. If you do not hear from us within 7 days, please contact the clinic through MyChart or phone. If you have a critical or abnormal lab result, we will notify you by phone as soon as possible.  Submit refill requests through [employer] or call your pharmacy and they will forward the refill request to us. Please allow 3 business days for your refill to be completed.          Additional Information About Your Visit        MyChart Information     [employer] lets you send messages to your doctor, view your test results, renew your prescriptions, schedule appointments and more. To  "sign up, go to [email or web address] . Click on \"Log in\" on the left side of the screen, which will take you to the Welcome page. Then click on \"Sign up Now\" on the right side of the page.     You will be asked to enter the access code listed below, as well as some personal information. Please follow the directions to create your username and password.     Your access code is: [email or web address]  Expires: 5/15/2017  4:58 PM     Your access code will  in 90 days. If you need help or a new code, please call your [place] clinic or [phone].        Care EveryWhere ID     This is your Care EveryWhere ID. This could be used by other organizations to access your [place] medical records  [id number]        Your Vitals Were     Last Period Breastfeeding? BMI (Body Mass Index)             2016 No 37.36 kg/m2          Blood Pressure from Last 3 Encounters:   17 128/78   17 108/66   17 124/76    Weight from Last 3 Encounters:   17 235 lb (106.6 kg)   17 230 lb (104.3 kg)   17 228 lb (103.4 kg)              We Performed the Following     NUTRITION REFERRAL        Primary Care Provider    None       No address on file        Thank you!     Thank you for choosing [facility]  for your care. Our goal is always to provide you with excellent care. Hearing back from our patients is one way we can continue to improve our services. Please take a few minutes to complete the written survey that you may receive in the mail after your visit with us. Thank you!             Your Updated Medication List - Protect others around you: Learn how to safely use, store and throw away your medicines at www.disposemymeds.org.          This list is accurate as of: 17 10:49 AM.  Always use your most recent med list.                   Brand Name Dispense Instructions for use    PRENATAL VITAMIN PO      Take by mouth daily         "

## 2017-05-08 NOTE — PATIENT INSTRUCTIONS
Round Ligament Pain    Pregnancy can entail many normal discomforts. One of those discomforts may be round ligament pain. Round ligament pain occurs as your uterus and your baby grow and the muscles begin to stretch more in your abdomen. Round ligament pain is normal and not dangerous but can be very uncomfortable      Round ligament pain is typically described as an unpleasant sensation that ranges from a sharp knifelike pain to dull intermittent pain in the lower abdominal/suprapubic area of a pregnant woman      Virtually all pregnant women will experience this pain at some point during their pregnancies      It typically manifests between 16 and 20 weeks of pregnancy and can be incredibly bothersome especially for women who remain very active during their pregnancies       Please discuss with your midwife if you are having this type of pain; sometimes the pain can be associated with other medical conditions so it is important for us to assess you just to make sure    Comfort measures    There are certain things you can do to cope with round ligament pain and ease the discomfort you are having      Pregnancy support belt      Tylenol      Hot/ice packs      Baths       Exercise such as yoga and swimming that help stretch muscles      Prenatal massage      Reflexology to waist and pelvic joints       Positioning such as side-lying and hands and knees, make sure your abdomen is  well supported with pillows while doing different positions    SIGNS OF  LABOR    Labor is  if it happens more than three weeks before your due date.    It can be hard to know if you are in labor, since the symptoms can be like the normal feelings of pregnancy.  Often, the only difference is the symptoms increase or they don't go away.     Signs of  labor can include:      Contractions which can feel like period cramps or gas pain.  You may feel it in the lower part of your abdomen, in your back, or as a pressure  feeling in your bottom.  It is often regular, coming every 5 or 10 minutes, and  lasting about 30-60 seconds. Some contractions are normal during pregnancy (Findlay fontaine contractions) but if you are feeling more than 5-6 in one hour that is NOT normal    If this occurs empty your bladder, then drink 2-3 glasses of water, eat a snack, and lay down on your left side. Put your hand on your abdomen to count the contractions.  If after one hour of resting you have still had 5-6 contractions call your clinic right away.      If you feel a pop, gush, or trickle of fluid it may mean that your bag of water has broken and you should contact the clinic       You may also experience loose stools and/or rectal pressure       Listen to your body, if something doesn't seem right please call us at the clinic    Risk Factors      Previous  delivery    Bacterial Vaginosis- if you notice a fishy smell to your discharge or experience vaginal itching/discomfort you should be evaluated for infection    Smoking    Drug abuse    Adolescent (teen) pregnancy or advanced maternal age (AMA) age 35 and over    Dehydration (this may not cause  labor but it can cause contractions)    If you think you are in  labor we may do some lab testing in the clinic or send you to the hospital for evaluation    Please call us if you are concerned you are in  labor.    [facility]  [phone]

## 2017-05-08 NOTE — PROGRESS NOTES
Feels pretty well.  States that fatigue and nausea is resolved at this time.  She had her anatomy US at [facility] this AM before her OB appointment.   Fetal movement: positive   Denies loss of fluid/vb/contractions  Anatomy ultrasound results discussed; to be reviewed by [facility] MD, having a Girl, Placenta: awaiting US report  Patient is concerned about gaining too much weight during pregnancy; nutrition referral placed  Patient is scheduled for fetal echo in 4 weeks at [facility]  Round ligament pain and comfort measures reviewed  Return to clinic 4 weeks for OB visit    Celeste MCNEIL CNM, TG-[facility] for [facility]

## 2017-05-08 NOTE — MR AVS SNAPSHOT
After Visit Summary   5/8/2017    Nhi Alexander    MRN: [id number]           Patient Information     Date Of Birth          1985        Visit Information        Provider Department      5/8/2017 9:15 AM Yari Hathaway DO [facility] Maternal Fetal Medicine [facility]        Today's Diagnoses     AMA (advanced maternal age) multigravida 35+, second trimester    -  1    Pregnancy resulting from assisted reproductive technology, second trimester           Follow-ups after your visit        Your next 10 appointments already scheduled     Jun 08, 2017  8:00 AM CDT   [facility] READ SCREEN FETAL EHCO Fraga with [id number]   [facility] Maternal Fetal Medicine Ultrasound - [facility] ([facility])    [address]  [place] [address] [phone]            Jun 08, 2017  8:30 AM CDT   Radiology MD with SH ANAIS BENOIT   [facility] Maternal Fetal Medicine [facility] ([facility])    [address]  [place] [address] [phone]           Please arrive at the time given for your first appointment.  This visit is used internally to schedule the physician's time during your ultrasound.              Future tests that were ordered for you today     Open Future Orders        Priority Expected Expires Ordered    [facility] Read Screen Fetal Echo Single Routine  3/8/2018 5/8/2017            Who to contact     If you have questions or need follow up information about today's clinic visit or your schedule please contact [facility] MATERNAL FETAL MEDICINE [facility] directly at [phone].  Normal or non-critical lab and imaging results will be communicated to you by MyChart, letter or phone within 4 business days after the clinic has received the results. If you do not hear from us within 7 days, please contact the clinic through MyChart or phone. If you have a critical or abnormal lab result, we will notify you by phone as soon as  "possible.  Submit refill requests through [employer] or call your pharmacy and they will forward the refill request to us. Please allow 3 business days for your refill to be completed.          Additional Information About Your Visit        [employer] Information     [employer] lets you send messages to your doctor, view your test results, renew your prescriptions, schedule appointments and more. To sign up, go to [email or web address] . Click on \"Log in\" on the left side of the screen, which will take you to the Welcome page. Then click on \"Sign up Now\" on the right side of the page.     You will be asked to enter the access code listed below, as well as some personal information. Please follow the directions to create your username and password.     Your access code is: [email or web address]  Expires: 5/15/2017  4:58 PM     Your access code will  in 90 days. If you need help or a new code, please call your [facility] clinic or [phone].        Care EveryWhere ID     This is your Care EveryWhere ID. This could be used by other organizations to access your [facility] medical records  [id number]        Your Vitals Were     Last Period                   2016            Blood Pressure from Last 3 Encounters:   17 128/78   17 108/66   17 124/76    Weight from Last 3 Encounters:   17 106.6 kg (235 lb)   17 104.3 kg (230 lb)   17 103.4 kg (228 lb)               Primary Care Provider    None       No address on file        Thank you!     Thank you for choosing [facility]NAL FETAL [facility]  for your care. Our goal is always to provide you with excellent care. Hearing back from our patients is one way we can continue to improve our services. Please take a few minutes to complete the written survey that you may receive in the mail after your visit with us. Thank you!             Your Updated Medication List - Protect others around you: Learn how to safely use, store and throw " away your medicines at www.disposemymeds.org.          This list is accurate as of: 5/8/17  9:58 PM.  Always use your most recent med list.                   Brand Name Dispense Instructions for use    PRENATAL VITAMIN PO      Take by mouth daily

## 2017-05-09 NOTE — PROGRESS NOTES
"Please see \"Imaging\" tab under \"Chart Review\" for details of today's US.    Yari Hathaway, DO    "

## 2017-05-12 ENCOUNTER — TELEPHONE (OUTPATIENT)
Dept: NURSING | Facility: CLINIC | Age: 32
End: 2017-05-12

## 2017-05-12 DIAGNOSIS — O12.00 EDEMA DURING PREGNANCY: Primary | ICD-10-CM

## 2017-05-12 NOTE — TELEPHONE ENCOUNTER
20w1d, MICHELLE 9/28/17. Pt has been having edema that usually resolved each morning. Edema is now not going down in the morning. Not able to put shoes on. Pt does have HA off and on, not every day. Denies blurred vision or epigastric pain.  BP in clinic were 128/78 and 108/66.  Pt states she is feeling FM. Denies vaginal bleeding, LOF, or cramping or contractions. Pt would like to have rx for compression stockings. Routing to Dee Lee. Please review and advise.

## 2017-05-12 NOTE — TELEPHONE ENCOUNTER
Okay for compression stockings. Also encourage increased hydration and elevate feet.     EWA Oliva, CNM

## 2017-05-12 NOTE — TELEPHONE ENCOUNTER
LMTCB usually these Rx's need to be taken to a medical supply location and are not dispensed by pharmacy. Does pt have a location she knows of that will dispense these for her and if so what is the fax number we can send it to?

## 2017-05-12 NOTE — TELEPHONE ENCOUNTER
RX ordered and signed by midwife. LM for pt to pick RX at . Informed of Ms Lee's suggestions listed below. LM to cb with any questions.

## 2017-05-17 ENCOUNTER — TELEPHONE (OUTPATIENT)
Dept: MIDWIFE SERVICES | Facility: CLINIC | Age: 32
End: 2017-05-17

## 2017-05-17 NOTE — TELEPHONE ENCOUNTER
Reason for Call:  Other     Detailed comments: Pt called and would like you to call the Provider Service Line for her insurance at [phone] to tell them the reason she needs a referral to a dietician/nutritionist to get it approved.    Phone Number Patient can be reached at: Home number on file [phone] (home)    Best Time: anytime    Can we leave a detailed message on this number? Not Applicable    Call taken on 5/17/2017 at 11:00 AM by Paula Pugh

## 2017-05-17 NOTE — TELEPHONE ENCOUNTER
Tried call insurance at number left by pt. Was unable to verify benefits for her dietician visit and was told she had no current insurance with them.. LM for patient regarding call to insurance company. She needs to call insurance company herself. Left my CB number

## 2017-06-02 ENCOUNTER — TELEPHONE (OUTPATIENT)
Dept: OBGYN | Facility: CLINIC | Age: 32
End: 2017-06-02

## 2017-06-02 NOTE — TELEPHONE ENCOUNTER
Spoke with patient in detail on phone regarding bilateral lower extremity edema.  She states that she has been wearing compression socks/stockings and is concerned that swelling should resolve.  She states that when she wears compression socks, edema reduces, but does not go completely away.  States that she has been having headaches approximately 1xweek; states headaches resolve with tylenol and/or rest.  Counseled patient on danger signs, s/s pre-eclampsia.  Counseled to increase fluid intake, elevate BLE when at rest, continue to wear support hose, continue daily exercise and activities.  Counseled to call office/on call service with any headaches with vision changes, rib pain that won't go away, abd pain that won't go away, vaginal bleeding, loss of fluid, or any other concerns.  Patient verbalized understanding.  All questions answered.  Patient has appointment scheduled 6/5/17.    Celeste MCNEIL CNM

## 2017-06-02 NOTE — TELEPHONE ENCOUNTER
Reason for call:  Patient reporting a symptom    Symptom or request: Pt has a question about swelling. 23 weeks pregnant    Duration (how long have symptoms been present): ?    Have you been treated for this before? Yes    Additional comments: please call pt back today    Phone Number patient can be reached at:  Cell number on file:    Telephone Information:   Mobile [phone]       Best Time:  today    Can we leave a detailed message on this number:  YES    Call taken on 6/2/2017 at 11:49 AM by Bethany Cervantes

## 2017-06-04 ENCOUNTER — TELEPHONE (OUTPATIENT)
Dept: OBGYN | Facility: CLINIC | Age: 32
End: 2017-06-04

## 2017-06-04 ENCOUNTER — HOSPITAL ENCOUNTER (OUTPATIENT)
Facility: CLINIC | Age: 32
Discharge: HOME | End: 2017-06-04
Attending: ADVANCED PRACTICE MIDWIFE | Admitting: ADVANCED PRACTICE MIDWIFE
Payer: COMMERCIAL

## 2017-06-04 ENCOUNTER — NURSE TRIAGE (OUTPATIENT)
Dept: NURSING | Facility: CLINIC | Age: 32
End: 2017-06-04

## 2017-06-04 LAB
ALT SERPL W P-5'-P-CCNC: 23 U/L (ref 0–50)
AST SERPL W P-5'-P-CCNC: 18 U/L (ref 0–45)
CREAT SERPL-MCNC: 0.68 MG/DL (ref 0.52–1.04)
CREAT UR-MCNC: 27 MG/DL
ERYTHROCYTE [DISTWIDTH] IN BLOOD BY AUTOMATED COUNT: 13 % (ref 10–15)
GFR SERPL CREATININE-BSD FRML MDRD: NORMAL ML/MIN/1.7M2
HCT VFR BLD AUTO: 36.6 % (ref 35–47)
HGB BLD-MCNC: 12.7 G/DL (ref 11.7–15.7)
MCH RBC QN AUTO: 31.3 PG (ref 26.5–33)
MCHC RBC AUTO-ENTMCNC: 34.7 G/DL (ref 31.5–36.5)
MCV RBC AUTO: 90 FL (ref 78–100)
PLATELET # BLD AUTO: 196 10E9/L (ref 150–450)
PROT UR-MCNC: <0.05 G/L
PROT/CREAT 24H UR: NORMAL G/G CR (ref 0–0.2)
RBC # BLD AUTO: 4.06 10E12/L (ref 3.8–5.2)
WBC # BLD AUTO: 9.8 10E9/L (ref 4–11)

## 2017-06-04 PROCEDURE — 85027 COMPLETE CBC AUTOMATED: CPT | Performed by: ADVANCED PRACTICE MIDWIFE

## 2017-06-04 PROCEDURE — 99214 OFFICE O/P EST MOD 30 MIN: CPT

## 2017-06-04 PROCEDURE — 84460 ALANINE AMINO (ALT) (SGPT): CPT | Performed by: ADVANCED PRACTICE MIDWIFE

## 2017-06-04 PROCEDURE — 36415 COLL VENOUS BLD VENIPUNCTURE: CPT | Performed by: ADVANCED PRACTICE MIDWIFE

## 2017-06-04 PROCEDURE — 84450 TRANSFERASE (AST) (SGOT): CPT | Performed by: ADVANCED PRACTICE MIDWIFE

## 2017-06-04 PROCEDURE — 82565 ASSAY OF CREATININE: CPT | Performed by: ADVANCED PRACTICE MIDWIFE

## 2017-06-04 PROCEDURE — 84156 ASSAY OF PROTEIN URINE: CPT | Performed by: ADVANCED PRACTICE MIDWIFE

## 2017-06-04 NOTE — TELEPHONE ENCOUNTER
Reason for Disposition    [1] Pregnant > 20 weeks AND [2] swelling of face, arm or hands  (Exception: slight puffiness of fingers)    Additional Information    Negative: Severe difficulty breathing (e.g., struggling for each breath, speaks in single words)    Negative: Sounds like a life-threatening emergency to the triager    Negative: Followed a leg injury    Negative: [1] Small area of swelling AND [2] followed an insect bite to the area    Negative: Swelling localized to ankle joint    Negative: Swelling localized to knee joint    Negative: SEVERE leg swelling (e.g., swelling extends above knee, entire leg is swollen)    Negative: Chest pain    Negative: [1] Difficulty breathing AND [2] new onset or worsening    Negative: [1] Pregnant > 20 weeks AND [2] blurred vision or visual changes    Negative: [1] Pregnant > 20 weeks AND [2] severe headache AND [3] not relieved with acetaminophen (e.g., Tylenol)    Negative: [1] Pregnant > 20 weeks AND [2] upper abdominal pain lasts > 1 hour    Negative: [1] Pregnant 23 or more weeks AND [2] baby is moving less today (e.g., kick count < 5 in 1 hour or < 10 in 2 hours)    Negative: [1] Red area or streak AND [2] fever    Negative: [1] Swelling is painful to touch AND [2] fever    Negative: [1] Cast on leg or ankle AND [2] now increased pain    Negative: Patient sounds very sick or weak to the triager    Protocols used: PREGNANCY - LEG SWELLING AND EDEMA-ADULT-AH  FNA paged on call MD Dee Lee at 8:19 am to phone patient back.

## 2017-06-04 NOTE — IP AVS SNAPSHOT
[address], [address]    [facility] [address]    Phone:  [phone]                                       After Visit Summary   6/4/2017    Nhi Alexander    MRN: [id number]           After Visit Summary Signature Page     I have received my discharge instructions, and my questions have been answered. I have discussed any challenges I see with this plan with the nurse or doctor.    ..........................................................................................................................................  Patient/Patient Representative Signature      ..........................................................................................................................................  Patient Representative Print Name and Relationship to Patient    ..................................................               ................................................  Date                                            Time    ..........................................................................................................................................  Reviewed by Signature/Title    ...................................................              ..............................................  Date                                                            Time

## 2017-06-04 NOTE — PLAN OF CARE
Here for blood pressure eval and Labs.  Results called to Midwife.  Discharged home sal verbal and written instructions.

## 2017-06-04 NOTE — DISCHARGE INSTRUCTIONS
Discharge Instruction for Undelivered Patients      You were seen for: blood pressure check  We Consulted: Dee Simmons  You had (Test or Medicine):Serial blood pressures, labs, FHT check     Diet:   Drink 8 to 12 glasses of liquids (milk, juice, water) every day.  You may eat meals and snacks.     Activity:  Stay on bed rest or partial bed rest. This means remain relaxing until your appointment with provider tomorrow     Call your provider if you notice:  Swelling in your face or increased swelling in your hands or legs.  Headaches that are not relieved by Tylenol (acetaminophen).  Changes in your vision (blurring: seeing spots or stars.)  Nausea (sick to your stomach) and vomiting (throwing up).   Weight gain of 5 pounds or more per week.  Heartburn that doesn't go away.  Signs of bladder infection: pain when you urinate (use the toilet), need to go more often and more urgently.  The bag of fofana (rupture of membranes) breaks, or you notice leaking in your underwear.  Bright red blood in your underwear.  Abdominal (lower belly) or stomach pain.  For first baby: Contractions (tightening) less than 5 minutes apart for one hour or more.  Second (plus) baby: Contractions (tightening) less than 10 minutes apart and getting stronger.  *If less than 34 weeks: Contractions (tightenings) more than 6 times in one hour.  Increase or change in vaginal discharge (note the color and amount)  Other:     Follow-up:  {OB DC INST NOT ADMITTED OTHER:2385613}

## 2017-06-04 NOTE — TELEPHONE ENCOUNTER
Nhi paged on call CNM at 0815 c/o increased blood pressures last night and again this morning. Reports 140s/90s and 151/100 per home cuff. Was seen in clinic on Friday for increased swelling and BPs. Patient also reports headache that has been helped by tylenol. She denies epigastric pain or visual changes. Reports good fetal movement. Recommend patient be seen in MAC for BP check and preeclampsia labs even though it would be rare to occur this early.     1100 update from RN  All labs WNL  BP initially 150/90 and repeat pressures 130s/80s.  Has appt in clinic tomorrow.  Will f/u with CNM in clinic and make a plan from there  Patient discharged home  Monitor edema and BP as needed    EWA Oliva, ATIFM

## 2017-06-04 NOTE — TELEPHONE ENCOUNTER
23 weeks pregnant and due date is 9/28/2017 Nhi is having swelling in legs and feet.  Today Bloodpressure at home is 155/101.

## 2017-06-04 NOTE — IP AVS SNAPSHOT
MRN:[id number]                      After Visit Summary   6/4/2017    Nhi Alexander    MRN: [id number]           Thank you!     Thank you for choosing [facility] for your care. Our goal is always to provide you with excellent care. Hearing back from our patients is one way we can continue to improve our services. Please take a few minutes to complete the written survey that you may receive in the mail after you visit with us. Thank you!        Patient Information     Date Of Birth          1985        About your hospital stay     You were admitted on:  June 4, 2017 You last received care in the:  [facility]    You were discharged on:  June 4, 2017       Who to Call     For medical emergencies, please call 911.  For non-urgent questions about your medical care, please call your primary care provider or clinic, None          Attending Provider     Provider Specialty    Lori Lee APRN CNM OB/Gyn       Primary Care Provider    None      Your next 10 appointments already scheduled     Jun 05, 2017  3:30 PM CDT   ESTABLISHED PRENATAL with EWA Peterson CNM   [facility] [place] ([facility])    [address] [place] [address] [address]  [facility] [address]   [phone]            Jun 08, 2017  8:00 AM CDT   ANAIS READ SCREEN FETAL EHCO Fraga with [id number]   [facility] Maternal Fetal Medicine Ultrasound - [facility])    [address] [place] [address] [address]  [facility] [address]   [phone]            Jun 08, 2017  8:30 AM CDT   Radiology MD with SH ANAIS BENOIT   [facility] Maternal Fetal Medicine [facility])    [address] [place] [address]   [phone]           Please arrive at the time given for your first appointment.  This visit is used internally to schedule the physician's time during your ultrasound.            Jun 08, 2017  9:00 AM CDT    ESTABLISHED PRENATAL with EWA Stokes CNM   [facility] [place] ([facility] [place])    [address] [place] [address] [address]  [place] [address]   [phone]            Jul 06, 2017  8:30 AM CDT   LAB with [facility]   [facility] [place] ([facility] [place])    [address] [place] [address] [address]  [place] [address]   [phone]           Patient must bring picture ID.  Patient should be prepared to give a urine specimen  Please do not eat 10-12 hours before your appointment if you are coming in fasting for labs on lipids, cholesterol, or glucose (sugar).  Pregnant women should follow their Care Team instructions. Water with medications is okay. Do not drink coffee or other fluids.   If you have concerns about taking  your medications, please ask at office or if scheduling via [facility], send a message by clicking on Secure Messaging, Message Your Care Team.            Jul 06, 2017  8:50 AM CDT   ESTABLISHED PRENATAL with EWA Jin CNM   [facility] [place] ([facility] [place])    [address] [place] [address] [address]  [place] [address]   [phone]            Jul 20, 2017  1:00 PM CDT   ESTABLISHED PRENATAL with EWA Stiles CNM   [facility] [place] ([facility] [place])    [address] [place] [address] [address]  [place] [address]   [phone]            Aug 03, 2017  8:30 AM CDT   ESTABLISHED PRENATAL with EWA Jin CNM   [facility] [place] ([facility] [place])    [address] [place] [address] [address]  [place] [address]   [phone]            Aug 18, 2017  9:00 AM CDT   ESTABLISHED PRENATAL with EWA Christina CNM   [facility] [place] ([facility] [place])    [address] [place] [address] [address]  [place] [address]   [phone]            Aug 31, 2017  8:30 AM CDT    ESTABLISHED PRENATAL with EWA Peterson CNM   [facility] [place] ([facility] [place])    [address] [address] [phone]              Further instructions from your care team       Discharge Instruction for Undelivered Patients      You were seen for: blood pressure check  We Consulted: Dee Simmons  You had (Test or Medicine):Serial blood pressures, labs, FHT check     Diet:   Drink 8 to 12 glasses of liquids (milk, juice, water) every day.  You may eat meals and snacks.     Activity:  Stay on bed rest or partial bed rest. This means remain relaxing until your appointment with provider tomorrow     Call your provider if you notice:  Swelling in your face or increased swelling in your hands or legs.  Headaches that are not relieved by Tylenol (acetaminophen).  Changes in your vision (blurring: seeing spots or stars.)  Nausea (sick to your stomach) and vomiting (throwing up).   Weight gain of 5 pounds or more per week.  Heartburn that doesn't go away.  Signs of bladder infection: pain when you urinate (use the toilet), need to go more often and more urgently.  The bag of fofana (rupture of membranes) breaks, or you notice leaking in your underwear.  Bright red blood in your underwear.  Abdominal (lower belly) or stomach pain.  For first baby: Contractions (tightening) less than 5 minutes apart for one hour or more.  Second (plus) baby: Contractions (tightening) less than 10 minutes apart and getting stronger.  *If less than 34 weeks: Contractions (tightenings) more than 6 times in one hour.  Increase or change in vaginal discharge (note the color and amount)  Other:     Follow-up:  {OB DC INST NOT ADMITTED OTHER:7003548}          Pending Results     Date and Time Order Name Status Description    6/4/2017 1018 Protein  random urine In process     6/4/2017 1015 Creatinine urine calculation only In process             Admission Information  "    Date & Time Provider Department Dept. Phone    2017 Jesus Lori Cates, EWA [facility] [place] [phone]      Your Vitals Were     Last Period                   2016           [employer] Information     [employer] lets you send messages to your doctor, view your test results, renew your prescriptions, schedule appointments and more. To sign up, go to [email or web address] . Click on \"Log in\" on the left side of the screen, which will take you to the Welcome page. Then click on \"Sign up Now\" on the right side of the page.     You will be asked to enter the access code listed below, as well as some personal information. Please follow the directions to create your username and password.     Your access code is: [email or web address]  Expires: 2017 10:28 AM     Your access code will  in 90 days. If you need help or a new code, please call your [facility] [place] clinic or [phone].        Care EveryWhere ID     This is your Care EveryWhere ID. This could be used by other organizations to access your [facility] medical records  [id number]           Review of your medicines      UNREVIEWED medicines. Ask your doctor about these medicines        Dose / Directions    PRENATAL VITAMIN PO        Take by mouth daily   Refills:  0         CONTINUE these medicines which have NOT CHANGED        Dose / Directions    order for DME   Used for:  Edema during pregnancy        Compression stockings 20-30mmHg   Quantity:  1 Device   Refills:  0                Protect others around you: Learn how to safely use, store and throw away your medicines at www.disposemymeds.org.             Medication List: This is a list of all your medications and when to take them. Check marks below indicate your daily home schedule. Keep this list as a reference.      Medications           Morning Afternoon Evening Bedtime As Needed    order for DME   Compression stockings 20-30mmHg                                PRENATAL VITAMIN PO "   Take by mouth daily

## 2017-06-05 ENCOUNTER — PRENATAL OFFICE VISIT (OUTPATIENT)
Dept: MIDWIFE SERVICES | Facility: CLINIC | Age: 32
End: 2017-06-05
Payer: COMMERCIAL

## 2017-06-05 VITALS — DIASTOLIC BLOOD PRESSURE: 90 MMHG | BODY MASS INDEX: 39.27 KG/M2 | WEIGHT: 247 LBS | SYSTOLIC BLOOD PRESSURE: 142 MMHG

## 2017-06-05 DIAGNOSIS — O26.892 PELVIC PAIN IN PREGNANCY, ANTEPARTUM, SECOND TRIMESTER: ICD-10-CM

## 2017-06-05 DIAGNOSIS — O99.212 OBESITY COMPLICATING PREGNANCY, SECOND TRIMESTER: ICD-10-CM

## 2017-06-05 DIAGNOSIS — O09.02 ENCOUNTER FOR SUPERVISION OF HIGH-RISK PREGNANCY WITH HISTORY OF INFERTILITY IN SECOND TRIMESTER: Primary | ICD-10-CM

## 2017-06-05 DIAGNOSIS — O12.00 EDEMA IN PREGNANCY, ANTEPARTUM: ICD-10-CM

## 2017-06-05 DIAGNOSIS — Z3A.23 23 WEEKS GESTATION OF PREGNANCY: ICD-10-CM

## 2017-06-05 DIAGNOSIS — R10.2 PELVIC PAIN IN PREGNANCY, ANTEPARTUM, SECOND TRIMESTER: ICD-10-CM

## 2017-06-05 PROCEDURE — 99207 ZZC PRENATAL VISIT: CPT | Performed by: NURSE PRACTITIONER

## 2017-06-05 NOTE — PROGRESS NOTES
Feels swollen.  She states that she has been having increased BLE edema and pelvic pressure that was worse over the weekend.  She also states that her BPs at home were elevated with max TP632g/90s.  She denies any vision changes, rib pain, NV.  States that she did have a mild HA yesterday that went away with Tylenol.  She was seen in the MAC and Pre-eclampsia workup negative at that time.  Denies chest pain, SOB, dyspnea, orthopnea, BLE pain.    Fetal movement: positive   Denies loss of fluid/vb/contractions  Lung sounds clear to auscultation bilaterally, S1S2 without murmur  GCT next visit, handout provided, reminded of longer appointment  Tdap next visit; reviewed CDC recommendations and partner/family vaccination recommended as well  Need for Rhogam? No; Rh positive    Return to clinic 1 week for BP check  Has fetal echo scheduled at [facility] 6[facility]17  Pregnancy support belt ordered for pelvic pressure  Counseled to continue to wear support socks; will consider thigh high or full support hose, counseled on danger signs, s/s pre-eclampsia, fetal movement awareness, PTL  Plan for 1 hr GCT and OB visit 4 weeks or sooner if needed    Celeste MCNEIL CNM

## 2017-06-05 NOTE — MR AVS SNAPSHOT
"              After Visit Summary   2017    Nhi Alexander    MRN: [id number]           Patient Information     Date Of Birth          1985        Visit Information        Provider Department      2017 3:30 PM Celeste Cadet APRN CNM [facility] [place]        Today's Diagnoses     Encounter for supervision of high-risk pregnancy with history of infertility in second trimester    -  1    Pelvic pain in pregnancy, antepartum, second trimester        Edema in pregnancy, antepartum        Obesity complicating pregnancy, second trimester        23 weeks gestation of pregnancy          Care Instructions    PREECLAMPSIA SIGNS AND SYMPTOMS    Preeclampsia is a dangerous condition that some women develop in the second half of pregnancy. It can also begin after the baby is born.  Preeclampsia causes high blood pressure and can cause problems with many organ systems in your body.  It can also affect the growth of your baby. The exact cause of preeclampsia is unknown, however, there are signs and symptoms to watch for:    -A bad headache that doesn't improve with Tylenol  -Visual changes such as spots, flashes of light, blurry vision  -Pain in the upper right part of your abdomen, especially under the ribs that doesn't go away  -Nausea and/or vomiting  -Feeling extremely tired  -Yellowing of the skin and/or eyes  -Feeling \"not quite right\" or that something is wrong  -An extreme amount of swelling (some swelling in pregnancy is very normal)    If your midwife feels that you are developing preeclampsia, you will have lab tests drawn and will be monitored very closely.     If you are experiencing anyof these symptoms, call the [facility] immediately at [phone].    SIGNS OF  LABOR    Labor is  if it happens more than three weeks before your due date.    It can be hard to know if you are in labor, since the symptoms can be like the normal feelings of pregnancy. "  Often, the only difference is the symptoms increase or they don't go away.     Signs of  labor can include:      Contractions which can feel like period cramps or gas pain.  You may feel it in the lower part of your abdomen, in your back, or as a pressure feeling in your bottom.  It is often regular, coming every 5 or 10 minutes, and  lasting about 30-60 seconds. Some contractions are normal during pregnancy (Nye fontaine contractions) but if you are feeling more than 5-6 in one hour that is NOT normal    If this occurs empty your bladder, then drink 2-3 glasses of water, eat a snack, and lay down on your left side. Put your hand on your abdomen to count the contractions.  If after one hour of resting you have still had 5-6 contractions call your clinic right away.      If you feel a pop, gush, or trickle of fluid it may mean that your bag of water has broken and you should contact the clinic       You may also experience loose stools and/or rectal pressure       Listen to your body, if something doesn't seem right please call us at the clinic    Risk Factors      Previous  delivery    Bacterial Vaginosis- if you notice a fishy smell to your discharge or experience vaginal itching/discomfort you should be evaluated for infection    Smoking    Drug abuse    Adolescent (teen) pregnancy or advanced maternal age (AMA) age 35 and over    Dehydration (this may not cause  labor but it can cause contractions)    If you think you are in  labor we may do some lab testing in the clinic or send you to the hospital for evaluation    Please call us if you are concerned you are in  labor.    [facility]  [phone]            Follow-ups after your visit        Follow-up notes from your care team     Return in about 1 week (around 2017) for BP Recheck.      Your next 10 appointments already scheduled     2017  8:00 AM CDT   MFM READ SCREEN FETAL EHCO Fraga with  [id number]   [facility] Maternal Fetal Medicine Ultrasound - [facility] ([facility])    [address]  [place] [address]   [phone]            Jun 08, 2017  8:30 AM CDT   Radiology MD with HAI MANZO MD   [facility] Maternal Fetal Medicine - [facility] ([facility])    [address] [place] [address] [address]  [place] [address]   [phone]           Please arrive at the time given for your first appointment.  This visit is used internally to schedule the physician's time during your ultrasound.            Jun 12, 2017  8:00 AM CDT   Nurse Only with WE TRIAGE   [facility] [place] ([facility] [place])    [address] [place] [address] [address]  [facility] [address]   [phone]            Jul 06, 2017  8:30 AM CDT   LAB with WE LAB   [facility] [place] ([facility] [place])    [address] [place] [address] [address]  [facility] [address]   [phone]           Patient must bring picture ID.  Patient should be prepared to give a urine specimen  Please do not eat 10-12 hours before your appointment if you are coming in fasting for labs on lipids, cholesterol, or glucose (sugar).  Pregnant women should follow their Care Team instructions. Water with medications is okay. Do not drink coffee or other fluids.   If you have concerns about taking  your medications, please ask at office or if scheduling via Caspidahart, send a message by clicking on Secure Messaging, Message Your Care Team.            Jul 06, 2017  8:50 AM CDT   ESTABLISHED PRENATAL with EWA Jin CNM   [facility] [place] ([facility] [place])    [address] [place] [address] [address]  [place] [address]   [phone]            Jul 20, 2017  1:00 PM CDT   ESTABLISHED PRENATAL with EWA Stiles CNM   [facility] [place] ([facility] [place])    [address] [place] [address] [address]  [place]  "[address]   [phone]            Aug 03, 2017  8:30 AM CDT   ESTABLISHED PRENATAL with EWA Jin CNM   [facility] [place] ([facility] [place])    [address] [place] [address] [address]  [place] [address]   [phone]            Aug 18, 2017  9:00 AM CDT   ESTABLISHED PRENATAL with EWA Christina CNM   [facility] [place] ([facility] [place])    [address] [place] [address] [address]  [place] [address]   [phone]            Aug 31, 2017  8:30 AM CDT   [facility] [place] [facility] [place]    [address] [place] [address] [address]  [place] [address]   [phone]              Who to contact     If you have questions or need follow up information about today's clinic visit or your schedule please contact [facility] [place] directly at [phone].  Normal or non-critical lab and imaging results will be communicated to you by Ombudhart, letter or phone within 4 business days after the clinic has received the results. If you do not hear from us within 7 days, please contact the clinic through Ombudhart or phone. If you have a critical or abnormal lab result, we will notify you by phone as soon as possible.  Submit refill requests through [email or web address] or call your pharmacy and they will forward the refill request to us. Please allow 3 business days for your refill to be completed.          Additional Information About Your Visit        OmbudharTapMe Information     [email or web address] lets you send messages to your doctor, view your test results, renew your prescriptions, schedule appointments and more. To sign up, go to [email or web address] . Click on \"Log in\" on the left side of the screen, which will take you to the Welcome page. Then click on \"Sign up Now\" on the right side of the page.     You will be asked to enter the access code listed below, as well " as some personal information. Please follow the directions to create your username and password.     Your access code is: [email or web address]  Expires: 2017 10:28 AM     Your access code will  in 90 days. If you need help or a new code, please call your [facility] clinic or [phone].        Care EveryWhere ID     This is your Care EveryWhere ID. This could be used by other organizations to access your [facility] medical records  [id number]        Your Vitals Were     Last Period BMI (Body Mass Index)                2016 39.27 kg/m2           Blood Pressure from Last 3 Encounters:   17 142/90   17 128/78   17 108/66    Weight from Last 3 Encounters:   17 247 lb (112 kg)   17 235 lb (106.6 kg)   17 230 lb (104.3 kg)              Today, you had the following     No orders found for display         Today's Medication Changes          These changes are accurate as of: 17  4:47 PM.  If you have any questions, ask your nurse or doctor.               These medicines have changed or have updated prescriptions.        Dose/Directions    * order for DME   This may have changed:  Another medication with the same name was added. Make sure you understand how and when to take each.   Used for:  Edema during pregnancy   Changed by:  Kellie Beatty APRN CNM        Compression stockings 20-30mmHg   Quantity:  1 Device   Refills:  0       * order for DME   This may have changed:  You were already taking a medication with the same name, and this prescription was added. Make sure you understand how and when to take each.   Used for:  Pelvic pain in pregnancy, antepartum, second trimester   Changed by:  Celeste Cadet APRN CNM        Pregnancy support belt for pelvic and back pain in pregnancy Wear daily when awake and active dispense #1, no refills   Quantity:  1 Device   Refills:  0       * Notice:  This list has 2 medication(s) that are the same as other  medications prescribed for you. Read the directions carefully, and ask your doctor or other care provider to review them with you.         Where to get your medicines      Some of these will need a paper prescription and others can be bought over the counter.  Ask your nurse if you have questions.     Bring a paper prescription for each of these medications     order for DME                Primary Care Provider    None       No address on file        Thank you!     Thank you for choosing [facility] [place]  for your care. Our goal is always to provide you with excellent care. Hearing back from our patients is one way we can continue to improve our services. Please take a few minutes to complete the written survey that you may receive in the mail after your visit with us. Thank you!             Your Updated Medication List - Protect others around you: Learn how to safely use, store and throw away your medicines at www.disposemymeds.org.          This list is accurate as of: 6/5/17  4:47 PM.  Always use your most recent med list.                   Brand Name Dispense Instructions for use    * order for DME     1 Device    Compression stockings 20-30mmHg       * order for DME     1 Device    Pregnancy support belt for pelvic and back pain in pregnancy Wear daily when awake and active dispense #1, no refills       PRENATAL VITAMIN PO      Take by mouth daily       * Notice:  This list has 2 medication(s) that are the same as other medications prescribed for you. Read the directions carefully, and ask your doctor or other care provider to review them with you.

## 2017-06-05 NOTE — PATIENT INSTRUCTIONS
"PREECLAMPSIA SIGNS AND SYMPTOMS    Preeclampsia is a dangerous condition that some women develop in the second half of pregnancy. It can also begin after the baby is born.  Preeclampsia causes high blood pressure and can cause problems with many organ systems in your body.  It can also affect the growth of your baby. The exact cause of preeclampsia is unknown, however, there are signs and symptoms to watch for:    -A bad headache that doesn't improve with Tylenol  -Visual changes such as spots, flashes of light, blurry vision  -Pain in the upper right part of your abdomen, especially under the ribs that doesn't go away  -Nausea and/or vomiting  -Feeling extremely tired  -Yellowing of the skin and/or eyes  -Feeling \"not quite right\" or that something is wrong  -An extreme amount of swelling (some swelling in pregnancy is very normal)    If your midwife feels that you are developing preeclampsia, you will have lab tests drawn and will be monitored very closely.     If you are experiencing anyof these symptoms, call the [facility] immediately at [phone].    SIGNS OF  LABOR    Labor is  if it happens more than three weeks before your due date.    It can be hard to know if you are in labor, since the symptoms can be like the normal feelings of pregnancy.  Often, the only difference is the symptoms increase or they don't go away.     Signs of  labor can include:      Contractions which can feel like period cramps or gas pain.  You may feel it in the lower part of your abdomen, in your back, or as a pressure feeling in your bottom.  It is often regular, coming every 5 or 10 minutes, and  lasting about 30-60 seconds. Some contractions are normal during pregnancy (Sean fontaine contractions) but if you are feeling more than 5-6 in one hour that is NOT normal    If this occurs empty your bladder, then drink 2-3 glasses of water, eat a snack, and lay down on your left side. Put your " hand on your abdomen to count the contractions.  If after one hour of resting you have still had 5-6 contractions call your clinic right away.      If you feel a pop, gush, or trickle of fluid it may mean that your bag of water has broken and you should contact the clinic       You may also experience loose stools and/or rectal pressure       Listen to your body, if something doesn't seem right please call us at the clinic    Risk Factors      Previous  delivery    Bacterial Vaginosis- if you notice a fishy smell to your discharge or experience vaginal itching/discomfort you should be evaluated for infection    Smoking    Drug abuse    Adolescent (teen) pregnancy or advanced maternal age (AMA) age 35 and over    Dehydration (this may not cause  labor but it can cause contractions)    If you think you are in  labor we may do some lab testing in the clinic or send you to the hospital for evaluation    Please call us if you are concerned you are in  labor.    [facility]  [phone]

## 2017-06-08 ENCOUNTER — HOSPITAL ENCOUNTER (INPATIENT)
Facility: CLINIC | Age: 32
LOS: 10 days | Discharge: HOME HEALTH | End: 2017-06-18
Attending: OBSTETRICS & GYNECOLOGY | Admitting: OBSTETRICS & GYNECOLOGY
Payer: COMMERCIAL

## 2017-06-08 ENCOUNTER — HOSPITAL ENCOUNTER (OUTPATIENT)
Dept: ULTRASOUND IMAGING | Facility: CLINIC | Age: 32
End: 2017-06-08
Attending: OBSTETRICS & GYNECOLOGY
Payer: COMMERCIAL

## 2017-06-08 ENCOUNTER — OFFICE VISIT (OUTPATIENT)
Dept: MATERNAL FETAL MEDICINE | Facility: CLINIC | Age: 32
End: 2017-06-08
Attending: OBSTETRICS & GYNECOLOGY
Payer: COMMERCIAL

## 2017-06-08 ENCOUNTER — HOSPITAL ENCOUNTER (OUTPATIENT)
Dept: ULTRASOUND IMAGING | Facility: CLINIC | Age: 32
Discharge: HOME | End: 2017-06-08
Attending: OBSTETRICS & GYNECOLOGY | Admitting: OBSTETRICS & GYNECOLOGY
Payer: COMMERCIAL

## 2017-06-08 VITALS — DIASTOLIC BLOOD PRESSURE: 90 MMHG | SYSTOLIC BLOOD PRESSURE: 148 MMHG

## 2017-06-08 DIAGNOSIS — O13.9 GESTATIONAL HYPERTENSION WITHOUT SIGNIFICANT PROTEINURIA, ANTEPARTUM: Primary | ICD-10-CM

## 2017-06-08 DIAGNOSIS — O09.812 PREGNANCY RESULTING FROM ASSISTED REPRODUCTIVE TECHNOLOGY, SECOND TRIMESTER: ICD-10-CM

## 2017-06-08 DIAGNOSIS — Z98.891 S/P CESAREAN SECTION: Primary | ICD-10-CM

## 2017-06-08 DIAGNOSIS — N39.0 URINARY TRACT INFECTION WITHOUT HEMATURIA, SITE UNSPECIFIED: ICD-10-CM

## 2017-06-08 DIAGNOSIS — O16.9 HYPERTENSION IN PREGNANCY, ANTEPARTUM: ICD-10-CM

## 2017-06-08 PROBLEM — O14.90 PREECLAMPSIA: Status: ACTIVE | Noted: 2017-06-08

## 2017-06-08 LAB
ABO + RH BLD: NORMAL
ABO + RH BLD: NORMAL
ALBUMIN SERPL-MCNC: 2.8 G/DL (ref 3.4–5)
ALP SERPL-CCNC: 66 U/L (ref 40–150)
ALT SERPL W P-5'-P-CCNC: 19 U/L (ref 0–50)
ALT SERPL W P-5'-P-CCNC: 22 U/L (ref 0–50)
ANION GAP SERPL CALCULATED.3IONS-SCNC: 10 MMOL/L (ref 3–14)
AST SERPL W P-5'-P-CCNC: 16 U/L (ref 0–45)
AST SERPL W P-5'-P-CCNC: 16 U/L (ref 0–45)
BILIRUB SERPL-MCNC: 0.3 MG/DL (ref 0.2–1.3)
BLD GP AB SCN SERPL QL: NORMAL
BLOOD BANK CMNT PATIENT-IMP: NORMAL
BUN SERPL-MCNC: 11 MG/DL (ref 7–30)
CALCIUM SERPL-MCNC: 8.8 MG/DL (ref 8.5–10.1)
CHLORIDE SERPL-SCNC: 108 MMOL/L (ref 94–109)
CO2 SERPL-SCNC: 24 MMOL/L (ref 20–32)
CREAT SERPL-MCNC: 0.75 MG/DL (ref 0.52–1.04)
CREAT SERPL-MCNC: 0.82 MG/DL (ref 0.52–1.04)
CREAT UR-MCNC: 20 MG/DL
ERYTHROCYTE [DISTWIDTH] IN BLOOD BY AUTOMATED COUNT: 13.1 % (ref 10–15)
ERYTHROCYTE [DISTWIDTH] IN BLOOD BY AUTOMATED COUNT: 13.3 % (ref 10–15)
GFR SERPL CREATININE-BSD FRML MDRD: 80 ML/MIN/1.7M2
GFR SERPL CREATININE-BSD FRML MDRD: 88 ML/MIN/1.7M2
GLUCOSE SERPL-MCNC: 78 MG/DL (ref 70–99)
HCT VFR BLD AUTO: 39.1 % (ref 35–47)
HCT VFR BLD AUTO: 41.5 % (ref 35–47)
HGB BLD-MCNC: 13.5 G/DL (ref 11.7–15.7)
HGB BLD-MCNC: 14.4 G/DL (ref 11.7–15.7)
MCH RBC QN AUTO: 31 PG (ref 26.5–33)
MCH RBC QN AUTO: 31.4 PG (ref 26.5–33)
MCHC RBC AUTO-ENTMCNC: 34.5 G/DL (ref 31.5–36.5)
MCHC RBC AUTO-ENTMCNC: 34.7 G/DL (ref 31.5–36.5)
MCV RBC AUTO: 90 FL (ref 78–100)
MCV RBC AUTO: 90 FL (ref 78–100)
PLATELET # BLD AUTO: 220 10E9/L (ref 150–450)
PLATELET # BLD AUTO: 230 10E9/L (ref 150–450)
POTASSIUM SERPL-SCNC: 3.8 MMOL/L (ref 3.4–5.3)
PROT SERPL-MCNC: 6.5 G/DL (ref 6.8–8.8)
PROT UR-MCNC: <0.05 G/L
PROT/CREAT 24H UR: NORMAL G/G CR (ref 0–0.2)
RBC # BLD AUTO: 4.36 10E12/L (ref 3.8–5.2)
RBC # BLD AUTO: 4.59 10E12/L (ref 3.8–5.2)
SODIUM SERPL-SCNC: 142 MMOL/L (ref 133–144)
SPECIMEN EXP DATE BLD: NORMAL
URATE SERPL-MCNC: 6.6 MG/DL (ref 2.6–6)
WBC # BLD AUTO: 13.4 10E9/L (ref 4–11)
WBC # BLD AUTO: 15.3 10E9/L (ref 4–11)

## 2017-06-08 PROCEDURE — 25000128 H RX IP 250 OP 636: Performed by: STUDENT IN AN ORGANIZED HEALTH CARE EDUCATION/TRAINING PROGRAM

## 2017-06-08 PROCEDURE — 76816 OB US FOLLOW-UP PER FETUS: CPT

## 2017-06-08 PROCEDURE — 86850 RBC ANTIBODY SCREEN: CPT | Performed by: STUDENT IN AN ORGANIZED HEALTH CARE EDUCATION/TRAINING PROGRAM

## 2017-06-08 PROCEDURE — 25000125 ZZHC RX 250: Performed by: STUDENT IN AN ORGANIZED HEALTH CARE EDUCATION/TRAINING PROGRAM

## 2017-06-08 PROCEDURE — 82565 ASSAY OF CREATININE: CPT | Performed by: OBSTETRICS & GYNECOLOGY

## 2017-06-08 PROCEDURE — 84156 ASSAY OF PROTEIN URINE: CPT | Performed by: STUDENT IN AN ORGANIZED HEALTH CARE EDUCATION/TRAINING PROGRAM

## 2017-06-08 PROCEDURE — 84550 ASSAY OF BLOOD/URIC ACID: CPT | Performed by: OBSTETRICS & GYNECOLOGY

## 2017-06-08 PROCEDURE — 84460 ALANINE AMINO (ALT) (SGPT): CPT | Performed by: OBSTETRICS & GYNECOLOGY

## 2017-06-08 PROCEDURE — 36415 COLL VENOUS BLD VENIPUNCTURE: CPT | Performed by: OBSTETRICS & GYNECOLOGY

## 2017-06-08 PROCEDURE — 12000028 ZZH R&B OB UMMC

## 2017-06-08 PROCEDURE — 80053 COMPREHEN METABOLIC PANEL: CPT | Performed by: OBSTETRICS & GYNECOLOGY

## 2017-06-08 PROCEDURE — 25000132 ZZH RX MED GY IP 250 OP 250 PS 637: Performed by: STUDENT IN AN ORGANIZED HEALTH CARE EDUCATION/TRAINING PROGRAM

## 2017-06-08 PROCEDURE — 86900 BLOOD TYPING SEROLOGIC ABO: CPT | Performed by: STUDENT IN AN ORGANIZED HEALTH CARE EDUCATION/TRAINING PROGRAM

## 2017-06-08 PROCEDURE — 85027 COMPLETE CBC AUTOMATED: CPT | Performed by: OBSTETRICS & GYNECOLOGY

## 2017-06-08 PROCEDURE — 76821 MIDDLE CEREBRAL ARTERY ECHO: CPT | Performed by: OBSTETRICS & GYNECOLOGY

## 2017-06-08 PROCEDURE — 84450 TRANSFERASE (AST) (SGOT): CPT | Performed by: OBSTETRICS & GYNECOLOGY

## 2017-06-08 PROCEDURE — 76825 ECHO EXAM OF FETAL HEART: CPT

## 2017-06-08 PROCEDURE — 86901 BLOOD TYPING SEROLOGIC RH(D): CPT | Performed by: STUDENT IN AN ORGANIZED HEALTH CARE EDUCATION/TRAINING PROGRAM

## 2017-06-08 RX ORDER — LABETALOL HYDROCHLORIDE 5 MG/ML
80 INJECTION, SOLUTION INTRAVENOUS EVERY 10 MIN PRN
Status: DISCONTINUED | OUTPATIENT
Start: 2017-06-08 | End: 2017-06-14

## 2017-06-08 RX ORDER — BETAMETHASONE SODIUM PHOSPHATE AND BETAMETHASONE ACETATE 3; 3 MG/ML; MG/ML
12 INJECTION, SUSPENSION INTRA-ARTICULAR; INTRALESIONAL; INTRAMUSCULAR; SOFT TISSUE EVERY 24 HOURS
Status: COMPLETED | OUTPATIENT
Start: 2017-06-08 | End: 2017-06-09

## 2017-06-08 RX ORDER — PRENATAL VIT/IRON FUM/FOLIC AC 27MG-0.8MG
1 TABLET ORAL DAILY
Status: DISCONTINUED | OUTPATIENT
Start: 2017-06-08 | End: 2017-06-14

## 2017-06-08 RX ORDER — ACETAMINOPHEN 325 MG/1
650 TABLET ORAL EVERY 4 HOURS PRN
Status: DISCONTINUED | OUTPATIENT
Start: 2017-06-08 | End: 2017-06-14

## 2017-06-08 RX ORDER — CALCIUM GLUCONATE 94 MG/ML
1 INJECTION, SOLUTION INTRAVENOUS
Status: DISCONTINUED | OUTPATIENT
Start: 2017-06-08 | End: 2017-06-10

## 2017-06-08 RX ORDER — ONDANSETRON 2 MG/ML
4 INJECTION INTRAMUSCULAR; INTRAVENOUS EVERY 6 HOURS PRN
Status: DISCONTINUED | OUTPATIENT
Start: 2017-06-08 | End: 2017-06-14

## 2017-06-08 RX ORDER — LABETALOL HYDROCHLORIDE 5 MG/ML
40 INJECTION, SOLUTION INTRAVENOUS EVERY 10 MIN PRN
Status: DISCONTINUED | OUTPATIENT
Start: 2017-06-08 | End: 2017-06-14

## 2017-06-08 RX ORDER — LABETALOL HYDROCHLORIDE 5 MG/ML
20 INJECTION, SOLUTION INTRAVENOUS EVERY 10 MIN PRN
Status: DISCONTINUED | OUTPATIENT
Start: 2017-06-08 | End: 2017-06-14

## 2017-06-08 RX ORDER — LIDOCAINE 40 MG/G
CREAM TOPICAL
Status: DISCONTINUED | OUTPATIENT
Start: 2017-06-08 | End: 2017-06-14

## 2017-06-08 RX ORDER — HYDRALAZINE HYDROCHLORIDE 20 MG/ML
5-10 INJECTION INTRAMUSCULAR; INTRAVENOUS
Status: DISCONTINUED | OUTPATIENT
Start: 2017-06-08 | End: 2017-06-14

## 2017-06-08 RX ORDER — MAGNESIUM SULFATE IN WATER 40 MG/ML
2 INJECTION, SOLUTION INTRAVENOUS CONTINUOUS
Status: DISCONTINUED | OUTPATIENT
Start: 2017-06-08 | End: 2017-06-10

## 2017-06-08 RX ORDER — MAGNESIUM SULFATE HEPTAHYDRATE 40 MG/ML
4 INJECTION, SOLUTION INTRAVENOUS
Status: DISCONTINUED | OUTPATIENT
Start: 2017-06-08 | End: 2017-06-14

## 2017-06-08 RX ORDER — SODIUM CHLORIDE, SODIUM LACTATE, POTASSIUM CHLORIDE, CALCIUM CHLORIDE 600; 310; 30; 20 MG/100ML; MG/100ML; MG/100ML; MG/100ML
INJECTION, SOLUTION INTRAVENOUS CONTINUOUS
Status: DISCONTINUED | OUTPATIENT
Start: 2017-06-08 | End: 2017-06-14

## 2017-06-08 RX ADMIN — ACETAMINOPHEN 650 MG: 325 TABLET, FILM COATED ORAL at 23:37

## 2017-06-08 RX ADMIN — SODIUM CHLORIDE, POTASSIUM CHLORIDE, SODIUM LACTATE AND CALCIUM CHLORIDE: 600; 310; 30; 20 INJECTION, SOLUTION INTRAVENOUS at 15:02

## 2017-06-08 RX ADMIN — Medication 6 G: at 14:36

## 2017-06-08 RX ADMIN — ONDANSETRON 4 MG: 2 INJECTION INTRAMUSCULAR; INTRAVENOUS at 15:08

## 2017-06-08 RX ADMIN — BETAMETHASONE SODIUM PHOSPHATE AND BETAMETHASONE ACETATE 12 MG: 3; 3 INJECTION, SUSPENSION INTRA-ARTICULAR; INTRALESIONAL; INTRAMUSCULAR at 14:43

## 2017-06-08 RX ADMIN — ACETAMINOPHEN 650 MG: 325 TABLET, FILM COATED ORAL at 18:14

## 2017-06-08 RX ADMIN — ACETAMINOPHEN 650 MG: 325 TABLET, FILM COATED ORAL at 14:08

## 2017-06-08 RX ADMIN — MAGNESIUM SULFATE IN WATER 2 G/HR: 40 INJECTION, SOLUTION INTRAVENOUS at 15:10

## 2017-06-08 NOTE — PROVIDER NOTIFICATION
06/08/17 1334   Provider Notification   Provider Name/Title Dr. Gallegos   Method of Notification Electronic Page   Request Evaluate in Person   Notification Reason Patient Arrived   Pt arrived from [facility] office. BP elevated on arrival, 3+ pitting edema, neckache. Otherwise anxious for provider to eval and determine plan of care.

## 2017-06-08 NOTE — IP AVS SNAPSHOT
MRN:[id number]                      After Visit Summary   6/8/2017    Nhi Alexander    MRN: [id number]           Thank you!     Thank you for choosing [facility] for your care. Our goal is always to provide you with excellent care. Hearing back from our patients is one way we can continue to improve our services. Please take a few minutes to complete the written survey that you may receive in the mail after you visit with us. Thank you!        Patient Information     Date Of Birth          1985        Designated Caregiver       Most Recent Value    Caregiver    Will someone help with your care after discharge? no      About your hospital stay     You were admitted on:  June 8, 2017 You last received care in the:  [facility]    You were discharged on:  June 18, 2017        Reason for your hospital stay       Delivery  Pre eclampsia with severe features  Abnormal umbilical artery dopplers                  Who to Call     For medical emergencies, please call 911.  For non-urgent questions about your medical care, please call your primary care provider or clinic, None  For questions related to your surgery, please call your surgery clinic        Attending Provider     Provider Specialty    Jessika Gallegos DO OB/Gyn    Yina, Emanuel Morales MD OB/Gyn    Sayda, Shirley Sanderson MD OB/Gyn       Primary Care Provider    Physician No Ref-Primary       When to contact your care team       Call your health care provider if you have any of the following: Fever above 100.4 F; opening or drainage from your incision; soaking a sanitary pad with blood within 1 hour, or you see blood clots larger than a golf ball; malodorous vaginal discharge, severe or worsening pain uncontrolled by your pain medications, nausea and vomiting, severe headaches, changes in vision, calf swelling or pain, shortness of breath, problems coping with sadness, anxiety, or depression.  If you have any concerns about hurting yourself or the  baby, call your provider immediately. You are encouraged to call with questions or concerns after you return home.                  After Care Instructions     Activity        delivery: No lifting more than 15 pounds for 6 weeks.  Nothing in the vagina for 6 weeks, no intercourse for 6 weeks. No strenuous exercise for 6 weeks. No driving until you have stopped taking your pain medications.            Diet       Follow this diet upon discharge: Orders Placed This Encounter      Room Service      Advance Diet as Tolerated: Regular Diet Adult            Monitor and record       Vaginal bleeding - call your provider if you are soaking more than a pad an hour for 2 hours, or passing clots larger than a golf ball.    Temperature - if you feel as though you may have a fever, take your temperature. If it is 100.4 F or more, please contact your provider.    Blood pressure - call if blood pressure is greater than 150 on the top number or greater than 100 on the bottom number.                  Follow-up Appointments     Adult [facility] Follow-up and recommended labs and tests       Call your OB to schedule:  - 1 week Blood pressure visit  - 6 week postpartum visit                  Your next 10 appointments already scheduled     2017  8:30 AM CDT   LAB with WE LAB   [facility] [place] ([facility] [place])    [address] [place] [address]   [phone]           Patient must bring picture ID.  Patient should be prepared to give a urine specimen  Please do not eat 10-12 hours before your appointment if you are coming in fasting for labs on lipids, cholesterol, or glucose (sugar).  Pregnant women should follow their Care Team instructions. Water with medications is okay. Do not drink coffee or other fluids.   If you have concerns about taking  your medications, please ask at office or if scheduling via [employer], send a message by clicking on Secure Messaging,  Message Your Care Team.            2017  8:50 AM CDT   ESTABLISHED PRENATAL with EWA Jin CNM   [facility] [place] ([facility] [place])    [address] [place] [address] [address]  [place] [address]   [phone]            2017  1:00 PM CDT   ESTABLISHED PRENATAL with EWA Stiles CNM   [facility] [place] ([facility] [place])    [address] [place] [address] [address]  [place] [address]   [phone]            Aug 03, 2017  8:30 AM CDT   ESTABLISHED PRENATAL with EWA Jin CNM   [facility] [place] ([facility] [place])    [address] [place] [address] [address]  [place] [address]   [phone]            Aug 18, 2017  9:00 AM CDT   ESTABLISHED PRENATAL with EWA Christina CNM   [facility] [place] ([facility] [place])    [address] [place] [address] [address]  [place] [address]   [phone]            Aug 31, 2017  8:30 AM CDT   ESTABLISHED PRENATAL with EWA Cotton CNM   [facility] [place] ([facility] [place])    [address] [place] [address] [address]  [place] [address]   [phone]              Further instructions from your care team       Postop  Birth Instructions    Activity       Do not lift more than 10 pounds for 6 weeks after surgery.  Ask family and friends for help when you need it.    No driving until you have stopped taking your pain medications (usually two weeks after surgery).    No heavy exercise or activity for 6 weeks.  Don't do anything that will put a strain on your surgery site.    Don't strain when using the toilet.  Your care team may prescribe a stool softener if you have problems with your bowel movements.     To care for your incision:       Keep the incision clean and dry.    Do not soak your incision in water. No swimming or hot tubs until it has fully  healed. You may soak in the bathtub if the water level is below your incision.    Do not use peroxide, gel, cream, lotion, or ointment on your incision.    Adjust your clothes to avoid pressure on your surgery site (check the elastic in your underwear for example).     You may see a small amount of clear or pink drainage and this is normal.  Check with your health care provider:       If the drainage increases or has an odor.    If the incision reddens, you have swelling, or develop a rash.    If you have increased pain and the medicine we prescribed doesn't help.    If you have a fever above 100.4 F (38 C) with or without chills when placing thermometer under your tongue.   The area around your incision (surgery wound), will feel numb.  This is normal. The numbness should go away in less than a year.     Keep your hands clean:  Always wash your hands before touching your incision (surgery wound). This helps reduce your risk of infection. If your hands aren't dirty, you may use an alcohol hand-rub to clean your hands. Keep your nails clean and short.    Call your healthcare provider if you have any of these symptoms:       You soak a sanitary pad with blood within 1 hour, or you see blood clots larger than a golf ball.    Bleeding that lasts more than 6 weeks.    Vaginal discharge that smells bad.    Severe pain, cramping or tenderness in your lower belly area.    A need to urinate more frequently (use the toilet more often), more urgently (use the toilet very quickly), or it burns when you urinate.    Nausea and vomiting.    Redness, swelling or pain around a vein in your leg.    Problems breastfeeding or a red or painful area on your breast.    Chest pain and cough or are gasping for air.    Problems with coping with sadness, anxiety or depression. If you have concerns about hurting yourself or the baby, call your provider immediately.      You have questions or concerns after you return home.     Follow up in  [facility] on Tuesday or Wednesday next week. Follow up in 6 weeks for post delivery check up. Follow up sooner if you are having concerns or mood problems.      Call [facility] intake line to set up an appointment with Meron Ovalle at [phone] for out patient counseling.             Pending Results     Date and Time Order Name Status Description    2017 09 Urine Culture Aerobic Bacterial Preliminary     2017 0537 Placenta Path Order and Indications (PLACENTA) In process     2017 0901 Toxoplasma Preg Panel >16 wk gest In process             Statement of Approval     Ordered          17 0924  I have reviewed and agree with all the recommendations and orders detailed in this document.  EFFECTIVE NOW     Approved and electronically signed by:  Shannen Hutchins MD             Admission Information     Date & Time Provider Department Dept. Phone    2017 Shirley Alfredo MD [facility] [phone]      Your Vitals Were     Blood Pressure Pulse Temperature Respirations Height Weight    141/88 60 97.9  F (36.6  C) 20 1.676 m (5' 6\") 111.3 kg (245 lb 4.8 oz)    Last Period Pulse Oximetry BMI (Body Mass Index)             2016 98% 39.59 kg/m2         [employer] Information     [employer] lets you send messages to your doctor, view your test results, renew your prescriptions, schedule appointments and more. To sign up, go to [email or web address] . Click on \"Log in\" on the left side of the screen, which will take you to the Welcome page. Then click on \"Sign up Now\" on the right side of the page.     You will be asked to enter the access code listed below, as well as some personal information. Please follow the directions to create your username and password.     Your access code is: [email or web address]  Expires: 2017 10:28 AM     Your access code will  in 90 days. If you need help or a new code, " please call your [facility] clinic or [phone].        Care EveryWhere ID     This is your Care EveryWhere ID. This could be used by other organizations to access your [facility] medical records  [id number]           Review of your medicines      START taking        Dose / Directions    acetaminophen 325 MG tablet   Commonly known as:  TYLENOL        Dose:  650 mg   Take 2 tablets (650 mg) by mouth every 6 hours as needed for mild pain or fever   Quantity:  60 tablet   Refills:  3       amoxicillin 875 MG tablet   Commonly known as:  AMOXIL   Indication:  Urinary Tract Infection   Used for:  Urinary tract infection without hematuria, site unspecified        Dose:  875 mg   Take 1 tablet (875 mg) by mouth every 12 hours for 4 days   Quantity:  8 tablet   Refills:  0       ibuprofen 600 MG tablet   Commonly known as:  ADVIL/MOTRIN        Dose:  600 mg   Take 1 tablet (600 mg) by mouth every 6 hours   Quantity:  60 tablet   Refills:  0       oxyCODONE-acetaminophen 5-325 MG per tablet   Commonly known as:  PERCOCET        Dose:  1-2 tablet   Take 1-2 tablets by mouth every 4 hours as needed for pain   Quantity:  45 tablet   Refills:  0       senna-docusate 8.6-50 MG per tablet   Commonly known as:  SENOKOT-S;PERICOLACE        Dose:  1-2 tablet   Take 1-2 tablets by mouth 2 times daily   Quantity:  60 tablet   Refills:  0         CONTINUE these medicines which have NOT CHANGED        Dose / Directions    * order for DME   Used for:  Edema during pregnancy        Compression stockings 20-30mmHg   Quantity:  1 Device   Refills:  0       * order for DME   Used for:  Pelvic pain in pregnancy, antepartum, second trimester        Pregnancy support belt for pelvic and back pain in pregnancy Wear daily when awake and active dispense #1, no refills   Quantity:  1 Device   Refills:  0       PRENATAL VITAMIN PO        Take by mouth daily   Refills:  0       * Notice:  This list has 2 medication(s) that are the same as other  medications prescribed for you. Read the directions carefully, and ask your doctor or other care provider to review them with you.         Where to get your medicines      These medications were sent to [facility] [place], [place] - [address]  [address], [facility] [address]     Phone:  [phone]     acetaminophen 325 MG tablet    amoxicillin 875 MG tablet    ibuprofen 600 MG tablet    senna-docusate 8.6-50 MG per tablet         Some of these will need a paper prescription and others can be bought over the counter. Ask your nurse if you have questions.     Bring a paper prescription for each of these medications     oxyCODONE-acetaminophen 5-325 MG per tablet                Protect others around you: Learn how to safely use, store and throw away your medicines at www.disposemymeds.org.             Medication List: This is a list of all your medications and when to take them. Check marks below indicate your daily home schedule. Keep this list as a reference.      Medications           Morning Afternoon Evening Bedtime As Needed    acetaminophen 325 MG tablet   Commonly known as:  TYLENOL   Take 2 tablets (650 mg) by mouth every 6 hours as needed for mild pain or fever   Last time this was given:  650 mg on 6/18/2017  5:24 AM                                amoxicillin 875 MG tablet   Commonly known as:  AMOXIL   Take 1 tablet (875 mg) by mouth every 12 hours for 4 days   Last time this was given:  875 mg on 6/17/2017  9:37 PM                                ibuprofen 600 MG tablet   Commonly known as:  ADVIL/MOTRIN   Take 1 tablet (600 mg) by mouth every 6 hours   Last time this was given:  600 mg on 6/18/2017  9:12 AM                                * order for DME   Compression stockings 20-30mmHg                                * order for DME   Pregnancy support belt for pelvic and back pain in pregnancy Wear daily when awake and active dispense #1, no refills                                 oxyCODONE-acetaminophen 5-325 MG per tablet   Commonly known as:  PERCOCET   Take 1-2 tablets by mouth every 4 hours as needed for pain                                PRENATAL VITAMIN PO   Take by mouth daily   Last time this was given:  1 tablet on 6/12/2017  8:19 AM                                senna-docusate 8.6-50 MG per tablet   Commonly known as:  SENOKOT-S;PERICOLACE   Take 1-2 tablets by mouth 2 times daily   Last time this was given:  1 tablet on 6/18/2017  9:12 AM                                * Notice:  This list has 2 medication(s) that are the same as other medications prescribed for you. Read the directions carefully, and ask your doctor or other care provider to review them with you.

## 2017-06-08 NOTE — MR AVS SNAPSHOT
After Visit Summary   6/8/2017    Nhi Alexander    MRN: [id number]           Patient Information     Date Of Birth          1985        Visit Information        Provider Department      6/8/2017 8:30 AM Juan Paniagua MD [facility] Maternal Fetal [facility]        Today's Diagnoses     Gestational hypertension without significant proteinuria, antepartum    -  1       Follow-ups after your visit        Your next 10 appointments already scheduled     Jun 12, 2017  8:00 AM CDT   Nurse Only with WE TRIAGE   [facility] [place] ([facility] [place])    [address]  [place] [address]   [phone]            Jul 06, 2017  8:30 AM CDT   LAB with WE LAB   [facility] [place] ([facility] [place])    [address]  [place] [address]   [phone]           Patient must bring picture ID.  Patient should be prepared to give a urine specimen  Please do not eat 10-12 hours before your appointment if you are coming in fasting for labs on lipids, cholesterol, or glucose (sugar).  Pregnant women should follow their Care Team instructions. Water with medications is okay. Do not drink coffee or other fluids.   If you have concerns about taking  your medications, please ask at office or if scheduling via [employer], send a message by clicking on Secure Messaging, Message Your Care Team.            Jul 06, 2017  8:50 AM CDT   ESTABLISHED PRENATAL with EWA Jin CNM   [facility] [place] ([facility] [place])    [address]  [place] [address]   [phone]            Jul 20, 2017  1:00 PM CDT   ESTABLISHED PRENATAL with EWA Stiles CNM   [facility] [place] ([facility] [place])    [address]  [place] [address]   [phone]            Aug 03, 2017  8:30 AM CDT  "  ESTABLISHED PRENATAL with EWA Jin CNM   [facility] [place] ([facility] [place])    [address]  [place] [address]   [phone]            Aug 18, 2017  9:00 AM CDT   ESTABLISHED PRENATAL with EWA Christina CNM   [facility] [place] ([facility] [place])    [address]  [place] [address]   [phone]            Aug 31, 2017  8:30 AM CDT   [facility]ABL[facility] [place] [facility] [place]    [address]  [place] [address]   [phone]              Future tests that were ordered for you today     Open Future Orders        Priority Expected Expires Ordered    [facility] Comprehensive Single F/U Routine 6/8/2017 6/8/2018 6/8/2017            Who to contact     If you have questions or need follow up information about today's clinic visit or your schedule please contact [facility] directly at [phone].  Normal or non-critical lab and imaging results will be communicated to you by MyChart, letter or phone within 4 business days after the clinic has received the results. If you do not hear from us within 7 days, please contact the clinic through [employer] or phone. If you have a critical or abnormal lab result, we will notify you by phone as soon as possible.  Submit refill requests through [facility] or call your pharmacy and they will forward the refill request to us. Please allow 3 business days for your refill to be completed.          Additional Information About Your Visit        [facility] Information     [facility] lets you send messages to your doctor, view your test results, renew your prescriptions, schedule appointments and more. To sign up, go to [email or web address] . Click on \"Log in\" on the left side of the screen, which will take you to the " "Welcome page. Then click on \"Sign up Now\" on the right side of the page.     You will be asked to enter the access code listed below, as well as some personal information. Please follow the directions to create your username and password.     Your access code is: [email or web address]  Expires: 2017 10:28 AM     Your access code will  in 90 days. If you need help or a new code, please call your [facility] clinic or [phone].        Care EveryWhere ID     This is your Care EveryWhere ID. This could be used by other organizations to access your [facility] medical records  [id number]        Your Vitals Were     Last Period                   2016            Blood Pressure from Last 3 Encounters:   17 148/90   17 142/90   17 128/78    Weight from Last 3 Encounters:   17 112 kg (247 lb)   17 106.6 kg (235 lb)   17 104.3 kg (230 lb)               Primary Care Provider    None       No address on file        Thank you!     Thank you for choosing [facility]  for your care. Our goal is always to provide you with excellent care. Hearing back from our patients is one way we can continue to improve our services. Please take a few minutes to complete the written survey that you may receive in the mail after your visit with us. Thank you!             Your Updated Medication List - Protect others around you: Learn how to safely use, store and throw away your medicines at www.disposemymeds.org.          This list is accurate as of: 17 11:03 AM.  Always use your most recent med list.                   Brand Name Dispense Instructions for use    * order for DME     1 Device    Compression stockings 20-30mmHg       * order for DME     1 Device    Pregnancy support belt for pelvic and back pain in pregnancy Wear daily when awake and active dispense #1, no refills       PRENATAL VITAMIN PO      Take by mouth daily       * Notice:  This list has 2 " medication(s) that are the same as other medications prescribed for you. Read the directions carefully, and ask your doctor or other care provider to review them with you.

## 2017-06-08 NOTE — IP AVS SNAPSHOT
[facility]    [address] [address]    Phone:  [phone]                                       After Visit Summary   6/8/2017    Nhi Alexander    MRN: [id number]           After Visit Summary Signature Page     I have received my discharge instructions, and my questions have been answered. I have discussed any challenges I see with this plan with the nurse or doctor.    ..........................................................................................................................................  Patient/Patient Representative Signature      ..........................................................................................................................................  Patient Representative Print Name and Relationship to Patient    ..................................................               ................................................  Date                                            Time    ..........................................................................................................................................  Reviewed by Signature/Title    ...................................................              ..............................................  Date                                                            Time

## 2017-06-08 NOTE — PROGRESS NOTES
"Please see \"Imaging\" tab under \"Chart Review\" for details of today's ultrasound.    Juan Paniagua M.D.  Specialist in Maternal-Fetal Medicine     "

## 2017-06-08 NOTE — H&P
Antepartum History and Physical   2017  Nhi Alexander  [id number]      HPI: Nhi Alexander is a 32 year old  at 24w0d by embryo transfer date consistent with 7w4d US who presents from [facility] clinic with concern for elevated blood pressures, worsening lower extremity edema, head and neck pain and new diagnosis of fetal growth restriction and reversed end diastolic umbilical artery doppler flow.  She states her blood pressures became elevated over the last several weeks, with a significant increase in lower extremity edema.  She had preeclampsia labs drawn last week, which were normal.  Has not had a history of elevated blood pressure before in the past.       She states that she is feeling well today other than some pain in the back of her neck at the base of her skull that she attributes to sleeping the wrong position last night.  She denies headache currently but did have occasional headaches beginning in the second trimester that were relieved by Tylenol, rest, and hydration.  She denies vision changes, chest pain, shortness of breath, right upper quadrant or epigastric pain, fever, chills, nausea, vomiting or other systemic complaints. She denies contractions, vaginal bleeding or loss of fluid and is feeling normal fetal movement.    ROS: Negative except HPI.    OBHX:   Obstetric History       T0      L0     SAB0   TAB0   Ectopic0   Multiple0   Live Births0       # Outcome Date GA Lbr Holden/2nd Weight Sex Delivery Anes PTL Lv   1 Current                   MedicalHX:   Past Medical History:   Diagnosis Date     Abdominal pain, unspecified abdominal location 2009     Problem list name updated by automated process. Provider to review     CARDIOVASCULAR SCREENING; LDL GOAL LESS THAN 160 10/31/2010     Chest pain 3/11/2015    feels like chest wall pain,       Dysmenorrhea      Hypertension      Infertility, female      Papanicolaou smear of vagina with low grade squamous  intraepithelial lesion (LGSIL) 12/8/2008       SurgicalHX:   Past Surgical History:   Procedure Laterality Date     DAVINCI LYSIS OF ADHESIONS N/A 11/5/2015    Procedure: DAVINCI LYSIS OF ADHESIONS;  Surgeon: Kimberly Suarez MD;  Location:  OR     DAVINCI PELVIC PROCEDURE N/A 11/5/2015    Procedure: DAVINCI XI PELVIC PROCEDURE;  Surgeon: Kimberly Suarez MD;  Location:  OR     DILATION AND CURETTAGE, OPERATIVE HYSTEROSCOPY WITH MORCELLATOR, COMBINED N/A 11/5/2015    Procedure: COMBINED DILATION AND CURETTAGE, OPERATIVE HYSTEROSCOPY WITH MORCELLATOR;  Surgeon: Kimberly Suarez MD;  Location:  OR     LAPAROSCOPIC TUBAL DYE STUDY Bilateral 11/5/2015    Procedure: LAPAROSCOPIC TUBAL DYE STUDY;  Surgeon: Kimberly Suarez MD;  Location:  OR     LAPAROSCOPY DIAGNOSTIC (GYN) N/A 11/5/2015    Procedure: LAPAROSCOPY DIAGNOSTIC (GYN);  Surgeon: Kimberly Suarez MD;  Location:  OR       Medications:   PNV    Allergies:  No Known Allergies    FamilyHX:    Family History   Problem Relation Age of Onset     Gynecology Maternal Grandmother      endometriosis, hyst done age31's (approx)     Gynecology Maternal Aunt      endometriosis, hyst done age 30's (approx)     Family History Negative Mother      Family History Negative Father      Coronary Artery Disease No family hx of      Hypertension No family hx of      DIABETES No family hx of      CEREBROVASCULAR DISEASE No family hx of        SocialHX:   Social History     Social History     Marital status:      Spouse name: N/A     Number of children: N/A     Years of education: N/A     Occupational History      [employer]     Social History Main Topics     Smoking status: Never Smoker     Smokeless tobacco: Never Used     Alcohol use No     Drug use: No     Sexual activity: Yes     Partners: Male     Birth control/ protection: None       Physical Exam:  Vital signs:  Patient Vitals for the past 24 hrs:   BP Temp Temp src Pulse Resp SpO2 Height Weight  "  06/08/17 1725 141/83 - - - - 99 % - -   06/08/17 1645 (!) 160/94 - - - - 98 % - -   06/08/17 1615 (!) 154/98 - - - - 99 % - -   06/08/17 1548 140/90 97.7  F (36.5  C) Oral 78 18 - - -   06/08/17 1514 148/84 - - - - - - -   06/08/17 1510 157/90 - - - - - - -   06/08/17 1500 (!) 152/91 - - - - - - -   06/08/17 1455 139/81 - - - - - - -   06/08/17 1450 149/82 - - - - - - -   06/08/17 1445 145/85 - - - - - - -   06/08/17 1440 (!) 157/91 - - - - - - -   06/08/17 1358 (!) 166/91 - - - - - - -   06/08/17 1343 (!) 184/93 - - - - - - -   06/08/17 1328 (!) 150/93 - - - - - - -   06/08/17 1305 (!) 161/96 98.3  F (36.8  C) Oral - 20 - 1.676 m (5' 6\") 106.6 kg (235 lb)       General: alert, oriented, NAD  CV: regular rate and rhythm, normal s1 and s2, no murmurs  Lungs: clear bilaterally, no crackles or wheezes  Abdomen: soft, gravid, non-tender  Extremities: bilateral lower extremities non-tender with 2+ edema  SVE deferred    FHT: baseline 155, moderate variability, no accelerations, no decelerations- appropriate for gestational age  [place]: no contractions    Prenatal ultrasounds:  6/8/17 @ 24w0d: EFW 469g, 15th%ile, AC 2%tile, with reversed end diastolic artery flow, with normal MCA and DV flow.  Otherwise normal fetal echo.  5/8/17 @ 20w: normal anatomy and growth scan       Labs:      Lab Results   Component Value Date    ABO A 06/08/2017    RH  Pos 06/08/2017    AS Neg 06/08/2017    HEPBANG Nonreactive 02/14/2017    CHPCRT  02/14/2017     Negative   Negative for C. trachomatis rRNA by transcription mediated amplification.   A negative result by transcription mediated amplification does not preclude the   presence of C. trachomatis infection because results are dependent on proper   and adequate collection, absence of inhibitors, and sufficient rRNA to be   detected.      GCPCRT  02/14/2017     Negative   Negative for N. gonorrhoeae rRNA by transcription mediated amplification.   A negative result by transcription mediated " amplification does not preclude the   presence of N. gonorrhoeae infection because results are dependent on proper   and adequate collection, absence of inhibitors, and sufficient rRNA to be   detected.      TREPAB Negative 2017    HGB 13.5 2017       GBS Status:   Unknown, will collect    Lab Results   Component Value Date    PAP NIL 2010       Assessment: 32 year old  at 24w0d with fetal growth restriction and reversed end diastolic flow with concerns for severe preeclampsia given severe range BPs on admission.    Plan:  - Admit to labor and delivery  - HTN: the patient had multiple severe-range blood pressures concerning for preeclampsia, thus we will give magnesium for seizure ppx, loading dose (6g for fetal neuroprotection) and continue for 2g/hr as well as for fetal neuro-protection.  No current signs or symptoms of end organ involvement.  - We will draw CBC, CMP, uric acid, urine protein/creatinine ratio.    - IV hypertensives as needed to treat severe range BPs  - strict I/O  - SCDs  - Social work to see    Fetus:  - continuous EFM. Tracing appropriate for gestational age.  - REDF: daily UAR, MCA and DV Dopplers  - NICU consult place.   - Betamethasone 12mg x2, 24 hrs apart to promote fetal lung maturity.    Patient seen and care plan discussed under supervision of Dr. Gallegos.    Mckayla Dukes MD  OBGYN PGY-1  [phone]  3:06 PM 2017    Attending Attestation:    I agree with the residents note above.  If labs return normal and BP remains controlled will proceed with expectant management of pregnancy.  Daily evaluation of UAR, MCA and DV doppler.  Will continue to trend preeclampsia labs, continue magnesium through the betamethasone window.  Likely inpatient until delivery.    I spent 30 minutes total face-to-face with this inpatient, and >50% of the time was spent in counseling and/or coordination of care.    Jessika Gallegos, DO  Maternal Fetal Medicine

## 2017-06-08 NOTE — PLAN OF CARE
Pt arrived as direct admit from [facility] location. Pt verbalizes the ultrasound today being concerning for lag in fetal growth as well as abnormal blood flow from the placenta. Pt also states she was seen 6/4 at their location with concerns primarily for swollen legs, feet and ankles. Pt notes after she saw the swelling she purchased a bp cuff and took it a few times all readings being elevated. Pt states she went in for evaluation, labs resulted as normal and bp readings on their cuff were better. She states she thinks that her home cuff was giving inaccurate results. Pt reports swelling today looks better than what it was. Pt denies HA, RUQ pain, visual dx, although she was complaining of her head hurting from a neck ache. Pt oriented to room, call light in reach, urgently paged providers to eval due to severe range bp on admission and 3+ pitting edema. Pt very teary, very taken aback that she had the potential to remain in hospital. Pt agreeable to magnesium loading dose and steroids. Asking good questions. Discussed social work, and NICU consult. Plan to admit pt as inpatient.

## 2017-06-08 NOTE — LETTER
Nhi Alexander  [address] [facility] [facility] [address]    June 13, 2017           To Whom It May Concern:      Nhi Alexander is admitted to the hospital for pregnancy complications.  She will not be able to return to work until after the baby is born.    Sincerely,      Apple Turcios MD  OBN   Pg [phone]

## 2017-06-08 NOTE — LETTER
Nhi Alexander  [address] [facility] [address]    June 13, 2017           To Whom It May Concern:      Nhi Alexander is admitted to the hospital for pregnancy complications.  She will not be able to return to work until after her maternity leave is complete.    Sincerely,      Apple Turcios MD  OBN   Pg [phone]

## 2017-06-09 ENCOUNTER — HOSPITAL ENCOUNTER (INPATIENT)
Dept: ULTRASOUND IMAGING | Facility: CLINIC | Age: 32
End: 2017-06-09
Attending: OBSTETRICS & GYNECOLOGY
Payer: COMMERCIAL

## 2017-06-09 LAB
ALBUMIN SERPL-MCNC: 2.7 G/DL (ref 3.4–5)
ALBUMIN SERPL-MCNC: 2.9 G/DL (ref 3.4–5)
ALP SERPL-CCNC: 67 U/L (ref 40–150)
ALP SERPL-CCNC: 72 U/L (ref 40–150)
ALT SERPL W P-5'-P-CCNC: 18 U/L (ref 0–50)
ALT SERPL W P-5'-P-CCNC: 19 U/L (ref 0–50)
ANION GAP SERPL CALCULATED.3IONS-SCNC: 10 MMOL/L (ref 3–14)
ANION GAP SERPL CALCULATED.3IONS-SCNC: 13 MMOL/L (ref 3–14)
AST SERPL W P-5'-P-CCNC: 13 U/L (ref 0–45)
AST SERPL W P-5'-P-CCNC: 16 U/L (ref 0–45)
BILIRUB SERPL-MCNC: 0.2 MG/DL (ref 0.2–1.3)
BILIRUB SERPL-MCNC: 0.2 MG/DL (ref 0.2–1.3)
BUN SERPL-MCNC: 12 MG/DL (ref 7–30)
BUN SERPL-MCNC: 8 MG/DL (ref 7–30)
CALCIUM SERPL-MCNC: 7.2 MG/DL (ref 8.5–10.1)
CALCIUM SERPL-MCNC: 7.4 MG/DL (ref 8.5–10.1)
CHLORIDE SERPL-SCNC: 105 MMOL/L (ref 94–109)
CHLORIDE SERPL-SCNC: 106 MMOL/L (ref 94–109)
CMV IGG SERPL QL IA: NORMAL AI (ref 0–0.8)
CMV IGM SERPL QL IA: NORMAL AI (ref 0–0.8)
CO2 SERPL-SCNC: 22 MMOL/L (ref 20–32)
CO2 SERPL-SCNC: 23 MMOL/L (ref 20–32)
COLLECT DURATION TIME UR: 24 H
CREAT 24H UR-MRATE: 1.29 G/(24.H) (ref 0.8–1.8)
CREAT SERPL-MCNC: 0.71 MG/DL (ref 0.52–1.04)
CREAT SERPL-MCNC: 0.78 MG/DL (ref 0.52–1.04)
CREAT UR-MCNC: 20 MG/DL
ERYTHROCYTE [DISTWIDTH] IN BLOOD BY AUTOMATED COUNT: 13.3 % (ref 10–15)
ERYTHROCYTE [DISTWIDTH] IN BLOOD BY AUTOMATED COUNT: 13.3 % (ref 10–15)
GFR SERPL CREATININE-BSD FRML MDRD: 85 ML/MIN/1.7M2
GFR SERPL CREATININE-BSD FRML MDRD: ABNORMAL ML/MIN/1.7M2
GLUCOSE SERPL-MCNC: 128 MG/DL (ref 70–99)
GLUCOSE SERPL-MCNC: 98 MG/DL (ref 70–99)
HCT VFR BLD AUTO: 38 % (ref 35–47)
HCT VFR BLD AUTO: 39.1 % (ref 35–47)
HGB BLD-MCNC: 13.1 G/DL (ref 11.7–15.7)
HGB BLD-MCNC: 13.7 G/DL (ref 11.7–15.7)
MCH RBC QN AUTO: 31.2 PG (ref 26.5–33)
MCH RBC QN AUTO: 31.6 PG (ref 26.5–33)
MCHC RBC AUTO-ENTMCNC: 34.5 G/DL (ref 31.5–36.5)
MCHC RBC AUTO-ENTMCNC: 35 G/DL (ref 31.5–36.5)
MCV RBC AUTO: 90 FL (ref 78–100)
MCV RBC AUTO: 91 FL (ref 78–100)
NT-PROBNP SERPL-MCNC: 126 PG/ML (ref 0–450)
PLATELET # BLD AUTO: 232 10E9/L (ref 150–450)
PLATELET # BLD AUTO: 239 10E9/L (ref 150–450)
POTASSIUM SERPL-SCNC: 4.2 MMOL/L (ref 3.4–5.3)
POTASSIUM SERPL-SCNC: 4.2 MMOL/L (ref 3.4–5.3)
PROT 24H UR-MRATE: 0.35 G/(24.H) (ref 0.04–0.23)
PROT SERPL-MCNC: 6.5 G/DL (ref 6.8–8.8)
PROT SERPL-MCNC: 6.7 G/DL (ref 6.8–8.8)
PROT UR-MCNC: 0.05 G/L
PROT/CREAT 24H UR: 0.27 G/G CR (ref 0–0.2)
RBC # BLD AUTO: 4.2 10E12/L (ref 3.8–5.2)
RBC # BLD AUTO: 4.33 10E12/L (ref 3.8–5.2)
SODIUM SERPL-SCNC: 139 MMOL/L (ref 133–144)
SODIUM SERPL-SCNC: 140 MMOL/L (ref 133–144)
SPECIMEN VOL UR: 6400 ML
T PALLIDUM IGG+IGM SER QL: NEGATIVE
WBC # BLD AUTO: 16 10E9/L (ref 4–11)
WBC # BLD AUTO: 16.7 10E9/L (ref 4–11)

## 2017-06-09 PROCEDURE — 86778 TOXOPLASMA ANTIBODY IGM: CPT | Performed by: OBSTETRICS & GYNECOLOGY

## 2017-06-09 PROCEDURE — 76821 MIDDLE CEREBRAL ARTERY ECHO: CPT | Performed by: OBSTETRICS & GYNECOLOGY

## 2017-06-09 PROCEDURE — 84156 ASSAY OF PROTEIN URINE: CPT | Performed by: OBSTETRICS & GYNECOLOGY

## 2017-06-09 PROCEDURE — 12000028 ZZH R&B OB UMMC

## 2017-06-09 PROCEDURE — 86406 PARTICLE AGGLUT ANTBDY TITR: CPT | Performed by: OBSTETRICS & GYNECOLOGY

## 2017-06-09 PROCEDURE — 25000132 ZZH RX MED GY IP 250 OP 250 PS 637: Performed by: STUDENT IN AN ORGANIZED HEALTH CARE EDUCATION/TRAINING PROGRAM

## 2017-06-09 PROCEDURE — 81050 URINALYSIS VOLUME MEASURE: CPT | Performed by: OBSTETRICS & GYNECOLOGY

## 2017-06-09 PROCEDURE — 86777 TOXOPLASMA ANTIBODY: CPT | Performed by: OBSTETRICS & GYNECOLOGY

## 2017-06-09 PROCEDURE — 85027 COMPLETE CBC AUTOMATED: CPT | Performed by: OBSTETRICS & GYNECOLOGY

## 2017-06-09 PROCEDURE — 86644 CMV ANTIBODY: CPT | Performed by: OBSTETRICS & GYNECOLOGY

## 2017-06-09 PROCEDURE — 86645 CMV ANTIBODY IGM: CPT | Performed by: OBSTETRICS & GYNECOLOGY

## 2017-06-09 PROCEDURE — 76819 FETAL BIOPHYS PROFIL W/O NST: CPT

## 2017-06-09 PROCEDURE — 36415 COLL VENOUS BLD VENIPUNCTURE: CPT | Performed by: OBSTETRICS & GYNECOLOGY

## 2017-06-09 PROCEDURE — 86780 TREPONEMA PALLIDUM: CPT | Performed by: OBSTETRICS & GYNECOLOGY

## 2017-06-09 PROCEDURE — 25000132 ZZH RX MED GY IP 250 OP 250 PS 637: Performed by: OBSTETRICS & GYNECOLOGY

## 2017-06-09 PROCEDURE — 83880 ASSAY OF NATRIURETIC PEPTIDE: CPT | Performed by: OBSTETRICS & GYNECOLOGY

## 2017-06-09 PROCEDURE — 80053 COMPREHEN METABOLIC PANEL: CPT | Performed by: OBSTETRICS & GYNECOLOGY

## 2017-06-09 PROCEDURE — 25000125 ZZHC RX 250: Performed by: STUDENT IN AN ORGANIZED HEALTH CARE EDUCATION/TRAINING PROGRAM

## 2017-06-09 PROCEDURE — 25000131 ZZH RX MED GY IP 250 OP 636 PS 637: Performed by: OBSTETRICS & GYNECOLOGY

## 2017-06-09 RX ORDER — CARBOXYMETHYLCELLULOSE SODIUM 5 MG/ML
1 SOLUTION/ DROPS OPHTHALMIC 3 TIMES DAILY PRN
Status: DISCONTINUED | OUTPATIENT
Start: 2017-06-09 | End: 2017-06-14

## 2017-06-09 RX ORDER — DIPHENHYDRAMINE HCL 25 MG
25-50 CAPSULE ORAL EVERY 6 HOURS PRN
Status: DISCONTINUED | OUTPATIENT
Start: 2017-06-09 | End: 2017-06-12

## 2017-06-09 RX ORDER — METOCLOPRAMIDE 10 MG/1
10 TABLET ORAL EVERY 6 HOURS PRN
Status: DISCONTINUED | OUTPATIENT
Start: 2017-06-09 | End: 2017-06-14

## 2017-06-09 RX ORDER — BUTALBITAL, ACETAMINOPHEN AND CAFFEINE 50; 325; 40 MG/1; MG/1; MG/1
1-2 TABLET ORAL EVERY 4 HOURS PRN
Status: DISCONTINUED | OUTPATIENT
Start: 2017-06-09 | End: 2017-06-14

## 2017-06-09 RX ADMIN — METOCLOPRAMIDE 10 MG: 10 TABLET ORAL at 08:12

## 2017-06-09 RX ADMIN — METOCLOPRAMIDE 10 MG: 10 TABLET ORAL at 00:40

## 2017-06-09 RX ADMIN — MAGNESIUM SULFATE IN WATER 2 G/HR: 40 INJECTION, SOLUTION INTRAVENOUS at 11:36

## 2017-06-09 RX ADMIN — BETAMETHASONE SODIUM PHOSPHATE AND BETAMETHASONE ACETATE 12 MG: 3; 3 INJECTION, SUSPENSION INTRA-ARTICULAR; INTRALESIONAL; INTRAMUSCULAR at 14:53

## 2017-06-09 RX ADMIN — DIPHENHYDRAMINE HYDROCHLORIDE 50 MG: 25 CAPSULE ORAL at 00:40

## 2017-06-09 RX ADMIN — METOCLOPRAMIDE 10 MG: 10 TABLET ORAL at 20:24

## 2017-06-09 RX ADMIN — CARBOXYMETHYLCELLULOSE SODIUM 1 DROP: 5 SOLUTION/ DROPS OPHTHALMIC at 06:40

## 2017-06-09 RX ADMIN — ACETAMINOPHEN 650 MG: 325 TABLET, FILM COATED ORAL at 08:12

## 2017-06-09 RX ADMIN — MAGNESIUM SULFATE IN WATER 2 G/HR: 40 INJECTION, SOLUTION INTRAVENOUS at 01:15

## 2017-06-09 RX ADMIN — ACETAMINOPHEN 650 MG: 325 TABLET, FILM COATED ORAL at 16:55

## 2017-06-09 RX ADMIN — MAGNESIUM SULFATE IN WATER 2 G/HR: 40 INJECTION, SOLUTION INTRAVENOUS at 21:45

## 2017-06-09 NOTE — PROGRESS NOTES
"Antepartum Magnesium Check    S: Patient feeling well. Headache is \"mostly gone.\"  No vision changes, upper abdominal pain, chest pain or SOB.     O:   Patient Vitals for the past 24 hrs:   BP Temp Temp src Pulse Resp SpO2 Height Weight   06/09/17 0454 - - - - 20 99 % - -   06/09/17 0406 - - - - 20 - - -   06/09/17 0325 122/70 - - - 18 97 % - -   06/09/17 0300 - - - - - 98 % - -   06/09/17 0215 - - - - 18 98 % - -   06/09/17 0200 - - - - - 95 % - -   06/09/17 0130 - - - - - 99 % - -   06/09/17 0115 121/68 - - - - 99 % - -   06/09/17 0100 - - - - - 100 % - -   06/09/17 0015 144/89 98.7  F (37.1  C) Oral - 20 99 % - -   06/09/17 0000 - - - - - 98 % - -   06/08/17 2345 - - - - - 99 % - -   06/08/17 2300 - - - - - 100 % - -   06/08/17 2155 - - - - - 99 % - -   06/08/17 2150 (!) 153/92 - - - - 99 % - -   06/08/17 2145 - - - - - 100 % - -   06/08/17 2140 (!) 153/93 - - - - 100 % - -   06/08/17 2135 (!) 156/93 - - - - 100 % - -   06/08/17 2130 (!) 156/93 - - - - 100 % - -   06/08/17 2125 - - - - - 100 % - -   06/08/17 2120 (!) 161/98 - - - - 100 % - -   06/08/17 2115 155/89 - - - - 100 % - -   06/08/17 2110 - - - - - 99 % - -   06/08/17 2105 - - - - - 99 % - -   06/08/17 2100 (!) 170/96 - - - 20 100 % - -   06/08/17 1835 138/90 - - - - 100 % - -   06/08/17 1800 - - - - - 99 % - -   06/08/17 1725 141/83 - - - - 99 % - -   06/08/17 1645 (!) 160/94 - - - - 98 % - -   06/08/17 1615 (!) 154/98 - - - - 99 % - -   06/08/17 1548 140/90 97.7  F (36.5  C) Oral 78 18 - - -   06/08/17 1514 148/84 - - - - - - -   06/08/17 1510 157/90 - - - - - - -   06/08/17 1500 (!) 152/91 - - - - - - -   06/08/17 1455 139/81 - - - - - - -   06/08/17 1450 149/82 - - - - - - -   06/08/17 1445 145/85 - - - - - - -   06/08/17 1440 (!) 157/91 - - - - - - -   06/08/17 1358 (!) 166/91 - - - - - - -   06/08/17 1343 (!) 184/93 - - - - - - -   06/08/17 1328 (!) 150/93 - - - - - - -   06/08/17 1305 (!) 161/96 98.3  F (36.8  C) Oral - 20 - 1.676 m (5' 6\") 106.6 " kg (235 lb)     Gen: appears alert and comfortable, NAD  Resp: CTAB  CV: RRR with no murmurs  Abdomen: soft, nontender  Ext: warm, well perfused, 2+ pitting edema, 1+ patellar and biceps reflexes. No clonus.     FHT: 120 baseline, moderate variability, occasional 10x10 accels, 1 spontaneous decel to stanley 60s, lasting ~2 min  TOCO: no ctx in 10 minute period     I/O last 3 completed shifts:  In: -   Out: 2600 [Urine:2300; Emesis/NG output:300]    Results for orders placed or performed during the hospital encounter of 06/08/17 (from the past 24 hour(s))   Protein random urine   Result Value Ref Range    Protein Random Urine <0.05 g/L    Protein Total Urine g/gr Creatinine Unable to calculate due to low value 0 - 0.2 g/g Cr   Creatinine urine calculation only   Result Value Ref Range    Creatinine Urine 20 mg/dL   Comprehensive metabolic panel   Result Value Ref Range    Sodium 142 133 - 144 mmol/L    Potassium 3.8 3.4 - 5.3 mmol/L    Chloride 108 94 - 109 mmol/L    Carbon Dioxide 24 20 - 32 mmol/L    Anion Gap 10 3 - 14 mmol/L    Glucose 78 70 - 99 mg/dL    Urea Nitrogen 11 7 - 30 mg/dL    Creatinine 0.82 0.52 - 1.04 mg/dL    GFR Estimate 80 >60 mL/min/1.7m2    GFR Estimate If Black >90   GFR Calc   >60 mL/min/1.7m2    Calcium 8.8 8.5 - 10.1 mg/dL    Bilirubin Total 0.3 0.2 - 1.3 mg/dL    Albumin 2.8 (L) 3.4 - 5.0 g/dL    Protein Total 6.5 (L) 6.8 - 8.8 g/dL    Alkaline Phosphatase 66 40 - 150 U/L    ALT 19 0 - 50 U/L    AST 16 0 - 45 U/L   ABO/Rh type and screen   Result Value Ref Range    ABO A     RH(D)  Pos     Antibody Screen Neg     Test Valid Only At       [facility]    Specimen Expires 06/11/2017    Uric acid   Result Value Ref Range    Uric Acid 6.6 (H) 2.6 - 6.0 mg/dL   CBC with platelets   Result Value Ref Range    WBC 13.4 (H) 4.0 - 11.0 10e9/L    RBC Count 4.36 3.8 - 5.2 10e12/L    Hemoglobin 13.5 11.7 - 15.7 g/dL    Hematocrit 39.1 35.0 -  47.0 %    MCV 90 78 - 100 fl    MCH 31.0 26.5 - 33.0 pg    MCHC 34.5 31.5 - 36.5 g/dL    RDW 13.1 10.0 - 15.0 %    Platelet Count 220 150 - 450 10e9/L   AST   Result Value Ref Range    AST 16 0 - 45 U/L   ALT   Result Value Ref Range    ALT 22 0 - 50 U/L   CBC with platelets   Result Value Ref Range    WBC 15.3 (H) 4.0 - 11.0 10e9/L    RBC Count 4.59 3.8 - 5.2 10e12/L    Hemoglobin 14.4 11.7 - 15.7 g/dL    Hematocrit 41.5 35.0 - 47.0 %    MCV 90 78 - 100 fl    MCH 31.4 26.5 - 33.0 pg    MCHC 34.7 31.5 - 36.5 g/dL    RDW 13.3 10.0 - 15.0 %    Platelet Count 230 150 - 450 10e9/L   Creatinine   Result Value Ref Range    Creatinine 0.75 0.52 - 1.04 mg/dL    GFR Estimate 88 >60 mL/min/1.7m2    GFR Estimate If Black >90   GFR Calc   >60 mL/min/1.7m2     A/P: Nhi Alexander is a  female at 24w1d admitted for  Preeclampsia with SF. Doing well. No s/s of worsening preeclampsia or magnesium toxicity. UOP 100ml/h for 4 past hours; adequate. BPs normal range. FHT appropriate for GA now, isolated spontaneous decel.   - Continue IV magnesium sulfate 2g/h   - Plan to discontinue IV Magnesium at 24h; 2017 1430  - Continue q4h Mag checks; next at 0930    Lindsey Nielsen MD, MPH  OB/GYN Resident G3  2017 5:25 AM

## 2017-06-09 NOTE — PLAN OF CARE
Problem: Hypertensive Disorders in Pregnancy (Adult,Obstetrics,Pediatric)  Goal: Signs and Symptoms of Listed Potential Problems Will be Absent or Manageable (Hypertensive Disorders in Pregnancy)  Signs and symptoms of listed potential problems will be absent or manageable by discharge/transition of care (reference Hypertensive Disorders in Pregnancy (Adult,Obstetrics,Pediatric) CPG).   Outcome: Improving  Vss. Afebrile. Denies bleeding or LOF. +FM. BP within parameters today. LAMA relieved with tylenol and reglan. Voiding a large amount. Edema in feet and ankles improved. 24 hour urine collection to d/c at 1530. Pt feeling generally well just mild dizziness and fatigue with magnesium, excited for it to be discontinued. Will receive second BMZ this afternoon.

## 2017-06-09 NOTE — PROGRESS NOTES
"Antepartum Magnesium Check    S: Patient feeling well except for mild headache and feeling dizzy with IV magnesium. Reports headache not relieved by tylenol, just tried reglan/benadryl. No vision changes, upper abdominal pain, chest pain or SOB.     O: Patient Vitals for the past 24 hrs:   BP Temp Temp src Pulse Resp SpO2 Height Weight   06/09/17 0015 144/89 98.7  F (37.1  C) Oral - 20 99 % - -   06/08/17 2300 - - - - - 100 % - -   06/08/17 2155 - - - - - 99 % - -   06/08/17 2150 (!) 153/92 - - - - 99 % - -   06/08/17 2145 - - - - - 100 % - -   06/08/17 2140 (!) 153/93 - - - - 100 % - -   06/08/17 2135 (!) 156/93 - - - - 100 % - -   06/08/17 2130 (!) 156/93 - - - - 100 % - -   06/08/17 2125 - - - - - 100 % - -   06/08/17 2120 (!) 161/98 - - - - 100 % - -   06/08/17 2115 155/89 - - - - 100 % - -   06/08/17 2110 - - - - - 99 % - -   06/08/17 2105 - - - - - 99 % - -   06/08/17 2100 (!) 170/96 - - - 20 100 % - -   06/08/17 1835 138/90 - - - - 100 % - -   06/08/17 1800 - - - - - 99 % - -   06/08/17 1725 141/83 - - - - 99 % - -   06/08/17 1645 (!) 160/94 - - - - 98 % - -   06/08/17 1615 (!) 154/98 - - - - 99 % - -   06/08/17 1548 140/90 97.7  F (36.5  C) Oral 78 18 - - -   06/08/17 1514 148/84 - - - - - - -   06/08/17 1510 157/90 - - - - - - -   06/08/17 1500 (!) 152/91 - - - - - - -   06/08/17 1455 139/81 - - - - - - -   06/08/17 1450 149/82 - - - - - - -   06/08/17 1445 145/85 - - - - - - -   06/08/17 1440 (!) 157/91 - - - - - - -   06/08/17 1358 (!) 166/91 - - - - - - -   06/08/17 1343 (!) 184/93 - - - - - - -   06/08/17 1328 (!) 150/93 - - - - - - -   06/08/17 1305 (!) 161/96 98.3  F (36.8  C) Oral - 20 - 1.676 m (5' 6\") 106.6 kg (235 lb)     Gen: appears alert and comfortable, NAD  Resp: CTAB  CV: RRR with no murmurs  Abdomen: soft, nontender  Ext: warm, well perfused, 2+ pitting edema, 2+ patellar and biceps reflexes. No clonus.     I/O last 3 completed shifts:  In: -   Out: 2000 [Urine:1700; Emesis/NG " output:300]    Results for orders placed or performed during the hospital encounter of 06/08/17 (from the past 24 hour(s))   Protein random urine   Result Value Ref Range    Protein Random Urine <0.05 g/L    Protein Total Urine g/gr Creatinine Unable to calculate due to low value 0 - 0.2 g/g Cr   Creatinine urine calculation only   Result Value Ref Range    Creatinine Urine 20 mg/dL   Comprehensive metabolic panel   Result Value Ref Range    Sodium 142 133 - 144 mmol/L    Potassium 3.8 3.4 - 5.3 mmol/L    Chloride 108 94 - 109 mmol/L    Carbon Dioxide 24 20 - 32 mmol/L    Anion Gap 10 3 - 14 mmol/L    Glucose 78 70 - 99 mg/dL    Urea Nitrogen 11 7 - 30 mg/dL    Creatinine 0.82 0.52 - 1.04 mg/dL    GFR Estimate 80 >60 mL/min/1.7m2    GFR Estimate If Black >90   GFR Calc   >60 mL/min/1.7m2    Calcium 8.8 8.5 - 10.1 mg/dL    Bilirubin Total 0.3 0.2 - 1.3 mg/dL    Albumin 2.8 (L) 3.4 - 5.0 g/dL    Protein Total 6.5 (L) 6.8 - 8.8 g/dL    Alkaline Phosphatase 66 40 - 150 U/L    ALT 19 0 - 50 U/L    AST 16 0 - 45 U/L   ABO/Rh type and screen   Result Value Ref Range    ABO A     RH(D)  Pos     Antibody Screen Neg     Test Valid Only At       [facility]    Specimen Expires 06/11/2017    Uric acid   Result Value Ref Range    Uric Acid 6.6 (H) 2.6 - 6.0 mg/dL   CBC with platelets   Result Value Ref Range    WBC 13.4 (H) 4.0 - 11.0 10e9/L    RBC Count 4.36 3.8 - 5.2 10e12/L    Hemoglobin 13.5 11.7 - 15.7 g/dL    Hematocrit 39.1 35.0 - 47.0 %    MCV 90 78 - 100 fl    MCH 31.0 26.5 - 33.0 pg    MCHC 34.5 31.5 - 36.5 g/dL    RDW 13.1 10.0 - 15.0 %    Platelet Count 220 150 - 450 10e9/L   AST   Result Value Ref Range    AST 16 0 - 45 U/L   ALT   Result Value Ref Range    ALT 22 0 - 50 U/L   CBC with platelets   Result Value Ref Range    WBC 15.3 (H) 4.0 - 11.0 10e9/L    RBC Count 4.59 3.8 - 5.2 10e12/L    Hemoglobin 14.4 11.7 - 15.7 g/dL    Hematocrit 41.5 35.0 - 47.0 %     MCV 90 78 - 100 fl    MCH 31.4 26.5 - 33.0 pg    MCHC 34.7 31.5 - 36.5 g/dL    RDW 13.3 10.0 - 15.0 %    Platelet Count 230 150 - 450 10e9/L   Creatinine   Result Value Ref Range    Creatinine 0.75 0.52 - 1.04 mg/dL    GFR Estimate 88 >60 mL/min/1.7m2    GFR Estimate If Black >90   GFR Calc   >60 mL/min/1.7m2     A/P: Nhi Alexander is a  female at 24w1d admitted for  Preeclampsia with SF. Doing well. No s/s of worsening preeclampsia or magnesium toxicity. UOP 500ml/h for past hour; adequate. Last magnesium level not assessed as no concerns for toxicity. BPs normal to mild range.  - Continue IV magnesium sulfate 2g/h   - Plan to discontinue IV Magnesium at 24h; 2017 1430  - Continue q4h Mag checks; next at 0500    Lindsey Nielsen MD, MPH  OB/GYN Resident G3  2017 1:16 AM

## 2017-06-09 NOTE — PLAN OF CARE
Problem: Hypertensive Disorders in Pregnancy (Adult,Obstetrics,Pediatric)  Goal: Signs and Symptoms of Listed Potential Problems Will be Absent or Manageable (Hypertensive Disorders in Pregnancy)  Signs and symptoms of listed potential problems will be absent or manageable by discharge/transition of care (reference Hypertensive Disorders in Pregnancy (Adult,Obstetrics,Pediatric) CPG).   Outcome: Declining  Patients blood pressure continues to fluctuate, as the shift progresses.  Pt has a nagging headache that just wont get better.  Pt also has family stress with her sister getting diagnosed with breast cancer today.  Continue to offer support as needed, and monitor for any changes.

## 2017-06-09 NOTE — PLAN OF CARE
Problem: Hypertensive Disorders in Pregnancy (Adult,Obstetrics,Pediatric)  Goal: Signs and Symptoms of Listed Potential Problems Will be Absent or Manageable (Hypertensive Disorders in Pregnancy)  Signs and symptoms of listed potential problems will be absent or manageable by discharge/transition of care (reference Hypertensive Disorders in Pregnancy (Adult,Obstetrics,Pediatric) CPG).   Outcome: No Change  Pt able to get some sleep after benadryl and reglan. Mild headache remains. BPs remained 120-140s/60-80s overnight. Nausea has returned, requested juice and crackers. Denies vision changes, epigastric pain, ctx, vaginal bleeding, LOF. See flowsheets for FHR pattern. Daily weight obtained. Pt resting at this time. Continue with plan of care.

## 2017-06-09 NOTE — PROGRESS NOTES
[facility] Antepartum Progress Note    Nhi Alexander MRN# [id number]   Age: 32 year old  Gestational age: 24w1d YOB: 1985       Date of Admission: 6/8/2017          Subjective:        Patient states that she feels better today than yesterday.  Reports her swelling is better as is her HA after taking tylenol, reglan and toast this AM.  Denies any vision changes, CP, SOB or RUQ/epigastric pain.  Reports irregular BH contractions, nothing more than baseline.  Denies any VB or LOF. Good FM.  Denies any other questions or concerns at this time.            Objective:        Patient Vitals for the past 8 hrs:   BP Temp Temp src Resp SpO2 Weight   06/09/17 0747 123/75 97.9  F (36.6  C) Oral 16 - -   06/09/17 0700 - - - - 98 % -   06/09/17 0536 148/88 - - - - -   06/09/17 0530 - - - - - 111 kg (244 lb 11.2 oz)   06/09/17 0454 - - - 20 99 % -   06/09/17 0406 - - - 20 - -   06/09/17 0325 122/70 - - 18 97 % -   06/09/17 0300 - - - - 98 % -     Gen: Well-appearing, NAD  CV: Regular rate and rhythm without appreciable murmurs.    Resp: Non-labored breathing.  Appropriate inspiratory effort. Lung sounds are clear to auscultation bilaterally.    Abdomen: Gravid, Soft, Non-tender  Neuro: 2+ patellar reflexes b/l   LE: 1-2+ pitting edema to pre-tibia     Fetal Heart Rate Tracing:   Baseline 130  Variability: Minimal to moderate  Accelerations: Not Present  Decelerations: None    Tocometer: Quiet     Component      Latest Ref Rng & Units 6/9/2017   Sodium      133 - 144 mmol/L 139   Potassium      3.4 - 5.3 mmol/L 4.2   Chloride      94 - 109 mmol/L 106   Carbon Dioxide      20 - 32 mmol/L 23   Anion Gap      3 - 14 mmol/L 10   Glucose      70 - 99 mg/dL 128 (H)   Urea Nitrogen      7 - 30 mg/dL 8   Creatinine      0.52 - 1.04 mg/dL 0.78   GFR Estimate      >60 mL/min/1.7m2 85   GFR Estimate If Black      >60 mL/min/1.7m2 >90 . . .   Calcium      8.5 - 10.1 mg/dL 7.4 (L)   Bilirubin Total      0.2 - 1.3 mg/dL  0.2   Albumin      3.4 - 5.0 g/dL 2.7 (L)   Protein Total      6.8 - 8.8 g/dL 6.5 (L)   Alkaline Phosphatase      40 - 150 U/L 72   ALT      0 - 50 U/L 18   AST      0 - 45 U/L 13   WBC      4.0 - 11.0 10e9/L 16.0 (H)   RBC Count      3.8 - 5.2 10e12/L 4.20   Hemoglobin      11.7 - 15.7 g/dL 13.1   Hematocrit      35.0 - 47.0 % 38.0   MCV      78 - 100 fl 91   MCH      26.5 - 33.0 pg 31.2   MCHC      31.5 - 36.5 g/dL 34.5   RDW      10.0 - 15.0 % 13.3   Platelet Count      150 - 450 10e9/L 232   N-Terminal Pro BNP Inpatient      0 - 450 pg/mL 126     Recent US:   6/9/2017   BPP: 6/8, off for breathing which is appropriate for GA and magnesium   UAR: absent end diastolic with normal DV flow    6/8/2017   GENERAL EVALUATION  ---------------------------------------------------------------------------------------------------------  Cardiac activity: present.  bpm.  Presentation: cephalic.  Placenta:  Placental site: posterior.  Umbilical cord: 3 vessel cord.  Amniotic fluid: Amount of AF: normal amount. MVP 4.5 cm. ANAHI 15.6 cm. Q1 3.8 cm, Q2 3.2 cm, Q3 4.5 cm, Q4 4.1 cm.        FETAL BIOMETRY  ---------------------------------------------------------------------------------------------------------  Main Fetal Biometry:  BPD                                   56.2            mm                                         23w 1d                               Hadlock  OFD                                   70.3            mm                                         21w 6d                               Nicolaides  HC                                      201.7          mm                                        22w 2d                               Hadlock  AC                                      168.1          mm                                        21w 6d                               Hadlock  Femur                                 37.8            mm                                        22w 1d                                Jose  Cerebellum tr                       25.4            mm                                        23w 3d                               Nicolaides  CM                                     3.5              mm                                                                                   Humerus                             36.3            mm                                         22w 5d                              Alex  Fetal Weight Calculation:  EFW                                   469             g                   15%                                                           Kwesi  EFW (lb,oz)                         1 lb 1          oz  Calculated by                            Jose (BPD-HC-AC-FL)  Head / Face / Neck Biometry:                                        3.8              mm                                          Amniotic Fluid / FHR:  AF MVP                              4.5             cm                                                                                     ANAHI                                     15.6            cm                                                                                     FHR                                    155             bpm                                               FETAL ANATOMY  ---------------------------------------------------------------------------------------------------------  The following structures were visualized:  Head / Neck                         Cranium. Head size. Head shape. Lateral ventricles. Midline falx. Cavum septi pellucidi. Cisterna magna. Thalami.  Face                                   Profile.  Heart / Thorax                      4-chamber view. RVOT. LVOT.  Abdomen                             Abdominal wall: Abdominal wall and fetal cord insertion appear normal. Stomach: Stomach size and situs appear normal. Kidneys. Bladder:                                             Bladder appears normal in size and  shape.  Spine / Skelet.                     Cervical spine. Thoracic spine. Lumbar spine. Sacral spine.        FETAL DOPPLER  ---------------------------------------------------------------------------------------------------------  Umbilical Artery: abnormal, Reversed End Diastolic Flow with a few absent end diastolic flow.  Right Mid Cerebral Artery: normal.  PS                                      35.40          cm/s                                                                                  PS                                      1.15            MoM     Ductus Venosus: normal.  A-wave                                31.33          cm/s  a/S                                     0.47                                  6%                                                            JSUM        MATERNAL STRUCTURES  ---------------------------------------------------------------------------------------------------------  Cervix                                  Not examined.                                             Cervical length 47.4 mm.  Right Ovary                          Not examined.  Left Ovary                            Not examined.        RECOMMENDATION  ---------------------------------------------------------------------------------------------------------  We discussed the findings on today's ultrasound with the patient. We discussed her blood pressure which has been elevated to diastolics in the 100s. We checked it today  and its 148/90.  We discussed admission for beta methasone, daily dopplers as delivery may be necessary if no improvement in dopplers or worsening fetal status. Discussed with your  office today.            IMPRESSION  ---------------------------------------------------------------------------------------------------------  1) Intrauterine pregnancy at 24 0/7 weeks gestational age.  2) None of the anomalies commonly detected by ultrasound were evident.  3) Growth parameters and  estimated fetal weight were consistent less than expected interval growth and fetal growth restriction with an EFW in the 15th %ile  and abdominal circumference in the 2nd %ile.  4) The amniotic fluid volume appeared normal.  5) The UAR doppler shows persistent reversed end-diastolic flow. The MCA and ductus dopplers are normal.          Assessment/Plan:   Nhi Alexander is a 32 year old  at 24w1d by IVF dating who id HD#2 admitted with IUGR and abnormal fetal dopplers as well as concern for pre-eclampsia given elevated blood pressures.     Gestational HTN, concerns for pre-eclampsia with severe features:   - Denies severe symptoms this AM (HA resolved)  - Severe range blood pressure in clinic and upon initial admission but then mild range since magnesium   - HELLP labs WNL this AM, repeat at 1500  - P:Cr on admit normal.  Given high clinical suspicion for pre-E 24-hour urine protein in process (collection time to end around 1530)  - S/p magnesium bolus and now on 2 gm/hour maintenance.  Will plan to discontinue after 24-hour protein if negative/normal or if positive confirming pre-E w/ SF will continue though BMZ window    FWB:   - FHT appropriate for GA  - S/p #1/2 BMZ at 1443 on   - US on : EFW 15%ile at 469 gms, AC <2%ile, UAR with persistent REDF  - BPP on :  (off for breathing) with absent EDF, continue with daily BPP/dopplers   - Magnesium on currently for pre-E  - NNP consultation today   - Fetal Echo on  WNL    IUGR:   - With recent URI (3 weeks ago), TORCH titers including CMV/Toxo ordered   - C/S consent signed on     Other:   - AMA; normal Verifi  - Rh positive, RI  - Will need GCT  - GBS pending    Tessy Singleton MD   OB/Gyn Resident, PGY-3  2017      Attending Attestation:    I agree with the residents note above.  Plan for close monitoring.  24 hour urine protein pending.  No sign or symptoms of severe preeclampsia at this time.  BPs have been mild range.  Continue  expectant management with daily UARs. BPP 6/8, off for breathing which is appropriate for gestation age.    I spent 15 minutes total face-to-face with this inpatient, and >50% of the time was spent in counseling and/or coordination of care.    Jessika Gallegos, DO  Maternal Fetal Medicine

## 2017-06-09 NOTE — PLAN OF CARE
"Problem: Hypertensive Disorders in Pregnancy (Adult,Obstetrics,Pediatric)  Goal: Signs and Symptoms of Listed Potential Problems Will be Absent or Manageable (Hypertensive Disorders in Pregnancy)  Signs and symptoms of listed potential problems will be absent or manageable by discharge/transition of care (reference Hypertensive Disorders in Pregnancy (Adult,Obstetrics,Pediatric) CPG).   Outcome: No Change  Pt reported headache, tylenol given (see MAR). Ice pack given. Pt reported little relief, asked if there were other options to help with headache. Benadryl and reglan given for headache relief and sleep (see MAR). Ginger ale given at pt report of nausea. Informed that the reglan can help with nausea. Order for eye drops placed per Dr. Nielsen at pt request due to dry eyes. Edema present. -140s/60-80s. Denies epigastric pain or vision changes. 24 urine collection in process. IV infusing magnesium and LR (see MAR). Continuous toco and EFM (see flowsheets). Denies vaginal bleeding, LOF. Reports occasional ctx, \"Sean-Wolff\" per pt. Plan to discontinue magnesium at 24 hours of infusion. Pt reports being overwhelmed with events of the day. Settled for bed now, headache still present. Labs and ultrasound planned for AM, second betamethasone this afternoon. Continue with pre-eclampsia cares and management.       "

## 2017-06-09 NOTE — DISCHARGE SUMMARY
[facility] Discharge Summary    Nhi Alexander MRN# [id number]   Age: 32 year old YOB: 1985     Date of Admission:  2017  Date of Discharge:  2017  Admitting Physician:  Jessika Gallegos DO ([facility])  Discharge Physician:  Shannen Hutchins MD (OB/GYN)          Admission Diagnoses:   -  at 24w1d  - Fetal growth restriction with reversed end diastolic flow  - Severe range BPs with concern for severe preeclampsia             Discharge Diagnosis:   - Pre eclampsia with severe features  -  s/p classical  section at 24w6d  - GBS positive complicating pregnancy  - Asymmetric intrauterine growth retardation complicating pregnancy  - Postoperative status          Procedures:   Procedure(s): Serial obstetric ultrasounds  Serial laboratory evaluation  Tdap administration  Chest XR  24 hour urine protein collection  Betamethasone administration  Magnesium for fetal neuroprotection and maternal seizure prophylaxis  Anti hypertensive medication titration  Spinal anesthesia  TAP Block  Primary classical  via pfannenstiel incision            Medications Prior to Admission:     Prescriptions Prior to Admission   Medication Sig Dispense Refill Last Dose     Prenatal Vit-Fe Fumarate-FA (PRENATAL VITAMIN PO) Take by mouth daily   2017 at Unknown time     order for DME Pregnancy support belt for pelvic and back pain in pregnancy  Wear daily when awake and active  dispense #1, no refills 1 Device 0 Unknown at Unknown time     order for DME Compression stockings 20-30mmHg 1 Device 0 Unknown at Unknown time             Discharge Medications:      Nhi Alexander   Home Medication Instructions GERARD:[id number]    Printed on:17 1025   Medication Information                      acetaminophen (TYLENOL) 325 MG tablet  Take 2 tablets (650 mg) by mouth every 6 hours as needed for mild pain or fever             amoxicillin (AMOXIL) 875 MG tablet  Take 1 tablet (875 mg) by mouth  every 12 hours for 4 days             ibuprofen (ADVIL/MOTRIN) 600 MG tablet  Take 1 tablet (600 mg) by mouth every 6 hours             order for DME  Compression stockings 20-30mmHg             order for DME  Pregnancy support belt for pelvic and back pain in pregnancy  Wear daily when awake and active  dispense #1, no refills             oxyCODONE-acetaminophen (PERCOCET) 5-325 MG per tablet  Take 1-2 tablets by mouth every 4 hours as needed for pain             Prenatal Vit-Fe Fumarate-FA (PRENATAL VITAMIN PO)  Take by mouth daily             senna-docusate (SENOKOT-S;PERICOLACE) 8.6-50 MG per tablet  Take 1-2 tablets by mouth 2 times daily                       Consultations:   NICU  Social work  Financial services   Physical Therapy           Brief Admission History   Nhi Alexander is a 32 year old  at 24w0d by embryo transfer date consistent with 7w4d US who presented from [facility] clinic with concern for elevated blood pressures, worsening lower extremity edema, head and neck pain and new diagnosis of fetal growth restriction and reversed end diastolic umbilical artery doppler flow.  She stated her blood pressures became elevated over the last several weeks, with a significant increase in lower extremity edema.  She had preeclampsia labs drawn the week prior to admission, which were normal.  Had not had a history of elevated blood pressure before in the past.              Hospital Course:   Pre-eclampsia with severe features  Nhi arrived from [facility] office. Her Blood pressure were elevated on arrival up to 184/93 with 3+ pitting edema and head & neck pain. She was given a loading dose of 6g of Magnesium for seizure prophylaxis and fetal neuroprotection. CXR was performed on 6/10/17 due to acute shortness of breath, which was negative for infiltrate and magnesium was found to be at a therapeutic level. This was then continued at a rate of 2g/hour for a total of 48 hours. A 24-hour  urine protein confirmed a diagnosis of pre eclampsia with severe features.    Her blood pressures were mild range until 24w4d 17. She required IV anti hypertensives for sustained severe range blood pressures. She also experienced intermittent severe headaches during that time that were responsive to medications. Procardia XL was started at 24w4d and titrated to 60mg XL at 24w5d. At 24w6d she started to spike sustained severe range blood pressures which were unable to be controlled despite titration of IV hydralazine and IV labetalol. At that point in time it was decided to move toward delivery given worsening of disease, inability to control blood pressures in a safe range.     Fetal growth restriction with abnormal umbilical artery doppler  Ultrasound day of admission at 24w1d 17 revealed EFW 15%ile with asymmetric IUGR AC <2%ile and persistent reversed end diastolic flow on umbilical artery dopplers. Betamethasone was administered for fetal lung maturation on 17-17 at [id number]. She received magnesium for fetal neuroprotection. She underwent daily BPPs throughout her hospitalization. Umbilical artery doppler transitioned from reversed end diastolic flow to intermittent absent end diastolic flow.     Prenatal care  - She was Rh positive, rhogam was not indicated postpartum  - Rubella immune, MMR not indicated postpartum  - GBS was collected and positive  - Tdap given 2017  - She had a urine culture that returned with enterococcus faecalis.  She was started on Amoxicillin on the day of discharge and this was continued for 5 days.     Delivery: At 24w6d she underwent primary classical  section given worsening preeclampsia with severe features, inability to maintain blood pressures below severe range despite IV antihypertensives. Her procedure was uncomplicated with EBL of 1000 cc. Operative findings noted below:    1. No intra abdominal or fascial adhesions  2. Clear yellow amniotic  fluid  3. Liveborn female infant in breech presentation delivered en caul. Apgars and weight pending.  4. Cord gases pending  5. Placenta extremely small in appearance, plan to send for pathology  6. Normal uterus, fallopian tubes, and ovaries. Normal appearing appendix visualized.  7. Recommend against labor in future pregnancies due to classical incision.      Postpartum Course: Her postpartum course was uncomplicated. She was continued on IV magnesium for 24 hours postpartum for seizure prophylaxis. Her PO antihypertensives were discontinued as her blood pressures returned to normal to low mild range. Her HELLP labs were normal. She diuresed appropriately. Given an EPDS score of 13 she was seen by social work and  psych for consultations. She was diagnosed with a enterococcus faecalis UTI on POD#3 and was started on a 5 day course of PO amoxicillin. On POD#4 she was meeting all of her discharge goals (voiding spontaneously, regaining bowel function, tolerating regular diet, pain was well controlled on PO main medications, ambulating without dizziness, vital signs were stable). She was discharged to home in stable condition at that time. Her preprocedure Hgb was 14.5, and postprocedure was 12.9. She was Rh positive and did not require rhogam.           Discharge Instructions and Follow-Up:   Discharge diet: Regular   Discharge activity: Resume activity as tolerated. No lifting greater than 15 lbs for 6 weeks. No driving while on narcotic pain medications.    Discharge follow-up: Follow up with primary OB in 6 weeks for postpartum visit, in 1 week for blood pressure check           Discharge Disposition:   Discharged to home      Eufemia Mckinley MD MPH  OB/GYN, PGY3  Pager: [phone]  2017  10:23 AM             I have seen, examined and counseled the patient on the day of discharge. Discussed post operative instructions and medications. I have reviewed and edited the note.   Shannen Hutchins

## 2017-06-09 NOTE — PROGRESS NOTES
"[facility]  MATERNAL CHILD HEALTH   SOCIAL WORK PROGRESS NOTE      DATA:   Met with patient yesterday afternoon.  Her mother was present in the room for this assessment visit.      Patient is Nhi Alexander. She is is 32 years-old and is  to her , Frederic Red.  They have been  since 2010 and live in [place], [place].  They have a 4 year history of infertility and achieved this pregnancy with IVF.  They know they are expecting a baby girl but have not yet chosen a name.  Nhi has given her a nickname, \"She\".    Nhi identifies a strong support system that includes family and friends.  She shares a close relationship with her sister, her sister-in-law, and girlfriends.  Her family lives locally.   Nhi explains that Frederic is very private.  His family lives in [place], [place].  They, too, are supportive but Jarod's personality limits him from reaching out to them for support.      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  She will be in touch with her human resource office about arrangements for her short-term disability benefits and maternity leave.   Nhi is hoping [facility] continue [facility]  Her original goal related to a full-term, health delivery was to take a 6 months maternity leave to be home with baby.  Frederic also works in IT for a [employer] network in [place].  He will continue to work while Nhi is hospitalized, pending Nhi's status and plan of care.    Nhi and Frederic each have individual health care coverage through their respective employers.  Baby will be added to Frederic's BCBS policy upon birth.  Nhi has started to gather baby supplies.      INTERVENTION:   Psychosocial assessment completed.  The majority of this 55 minute visit spent in supportive counseling related to adjustment to this change in status and anticipated, " "extended antepartum admission.      ASSESSMENT:   Nhi is open and is easy to engage.  She shared tears as she expressed feeling overwhelmed by this unexpected change in condition.  She self describes as a \"homebody\".  It is difficult for her to process the idea of an extended hospitalization away from home.  She thrives in [place] where she can anticipate and plan.  This experience challenges all of her coping and  leaves her feeling vulnerable and anxious.   She is open to ongoing SW involvement and support.      PLAN:   SW will continue to follow along throughout patient's pregnancy journey for needs and for support.                                       "

## 2017-06-09 NOTE — PROGRESS NOTES
Met with patient to go over  consent. Cephalic presentation currently. We discussed possiblity of frequent position changes and CS consent as precaution for this. Discussed emergent vs urgent CS, spinal/epidural vs GETA. Dicussed contraindications for breech vaginal delivery due to fetal head entrapment, possible fetal death and maternal morbidity as well. She had many questions about expectations of illness (preeclampsia), definitions of illness. We discussed placental origin of preE and IUGR. Discussed need for close monitoring, HELLP syndrome and indications of BP control (to lower risk of stroke) and IV magnesium (to decrease risk of seizure). We discussed that if all goes well she would be delivered at 34w0d via either IOL or CS pending specific circumstances at that time including fetal health, position as well as BP control and maternal health. Dicussed if she has no interval growth or dopplers worsen, if she develops HELLP or we are unable to control BPs then earlier delivery may be indicated. The risks, benefits, and alternatives of  section were discussed, including the risks of bleeding, infection, injury to surrounding organs, fetal injury, increased risk of VTE and remote risks of hysterectomy. She consented to a blood transfusion in the event of a life threatening amount of bleeding. We discussed rare risk of needing additional procedures. She had time to ask questions and agreed to proceed. Surgical consent was signed. She expressed that she is still processing this and still hopes to be able to go home before delivery. We dicussed indications for hospital stay and that discharge home prior to delivery is not recommended. She had questions about future pregnancies. We discussed need for early CS at 36-37 weeks if classical CS completed, option of TOLAC if LTCS is done, recommendation for waiting 18 months from delivery until getting pregnant again in future, risk of uterine rupture  with either LTCS or CCS, recommendation for 81mg ASA in next pregnancy to lower risk of preeclampsia, increased risk of having PreE in subsequent pregnancies. She had questions about diagnosis and we discussed risk factors for PreE along with diagnostic criteria.  We reviewed activity options and encouraged ambulation while on antepartum. Reviewed bed rest is not helpful and not recommended. We discussed risks of depression/anxitey with antepartum stay and postpartum. All her questions were answered. She requested CS risks be reviewed again with her  who is not currently here. She will meet with NICU when her  is available.     Lindsey Nielsen MD, MPH  OB/GYN Resident G3  6/8/2017 8:14 PM

## 2017-06-09 NOTE — PROGRESS NOTES
D/I:  Attempted supportive visit with patient today.  She was peacefully sleeping when I entered the room.  P:  Will check-in with patient again next week.

## 2017-06-10 ENCOUNTER — APPOINTMENT (OUTPATIENT)
Dept: GENERAL RADIOLOGY | Facility: CLINIC | Age: 32
End: 2017-06-10
Attending: OBSTETRICS & GYNECOLOGY
Payer: COMMERCIAL

## 2017-06-10 ENCOUNTER — HOSPITAL ENCOUNTER (INPATIENT)
Dept: ULTRASOUND IMAGING | Facility: CLINIC | Age: 32
End: 2017-06-10
Payer: COMMERCIAL

## 2017-06-10 LAB
ALBUMIN SERPL-MCNC: 3.2 G/DL (ref 3.4–5)
ALP SERPL-CCNC: 74 U/L (ref 40–150)
ALT SERPL W P-5'-P-CCNC: 20 U/L (ref 0–50)
ANION GAP SERPL CALCULATED.3IONS-SCNC: 13 MMOL/L (ref 3–14)
AST SERPL W P-5'-P-CCNC: 16 U/L (ref 0–45)
BILIRUB SERPL-MCNC: 0.4 MG/DL (ref 0.2–1.3)
BUN SERPL-MCNC: 12 MG/DL (ref 7–30)
CALCIUM SERPL-MCNC: 6.6 MG/DL (ref 8.5–10.1)
CHLORIDE SERPL-SCNC: 103 MMOL/L (ref 94–109)
CO2 SERPL-SCNC: 22 MMOL/L (ref 20–32)
CREAT SERPL-MCNC: 0.77 MG/DL (ref 0.52–1.04)
ERYTHROCYTE [DISTWIDTH] IN BLOOD BY AUTOMATED COUNT: 13.4 % (ref 10–15)
GFR SERPL CREATININE-BSD FRML MDRD: 86 ML/MIN/1.7M2
GLUCOSE SERPL-MCNC: 105 MG/DL (ref 70–99)
HCT VFR BLD AUTO: 40.4 % (ref 35–47)
HGB BLD-MCNC: 14 G/DL (ref 11.7–15.7)
MAGNESIUM SERPL-MCNC: 7.4 MG/DL (ref 1.6–2.3)
MCH RBC QN AUTO: 31.3 PG (ref 26.5–33)
MCHC RBC AUTO-ENTMCNC: 34.7 G/DL (ref 31.5–36.5)
MCV RBC AUTO: 90 FL (ref 78–100)
PLATELET # BLD AUTO: 274 10E9/L (ref 150–450)
POTASSIUM SERPL-SCNC: 3.9 MMOL/L (ref 3.4–5.3)
PROT SERPL-MCNC: 7.3 G/DL (ref 6.8–8.8)
RBC # BLD AUTO: 4.48 10E12/L (ref 3.8–5.2)
SODIUM SERPL-SCNC: 138 MMOL/L (ref 133–144)
WBC # BLD AUTO: 19.8 10E9/L (ref 4–11)

## 2017-06-10 PROCEDURE — 25000131 ZZH RX MED GY IP 250 OP 636 PS 637: Performed by: OBSTETRICS & GYNECOLOGY

## 2017-06-10 PROCEDURE — 76819 FETAL BIOPHYS PROFIL W/O NST: CPT

## 2017-06-10 PROCEDURE — 85027 COMPLETE CBC AUTOMATED: CPT | Performed by: OBSTETRICS & GYNECOLOGY

## 2017-06-10 PROCEDURE — 71020 XR CHEST 2 VW: CPT

## 2017-06-10 PROCEDURE — 25000132 ZZH RX MED GY IP 250 OP 250 PS 637: Performed by: STUDENT IN AN ORGANIZED HEALTH CARE EDUCATION/TRAINING PROGRAM

## 2017-06-10 PROCEDURE — 25000125 ZZHC RX 250: Performed by: STUDENT IN AN ORGANIZED HEALTH CARE EDUCATION/TRAINING PROGRAM

## 2017-06-10 PROCEDURE — 80053 COMPREHEN METABOLIC PANEL: CPT | Performed by: OBSTETRICS & GYNECOLOGY

## 2017-06-10 PROCEDURE — 76821 MIDDLE CEREBRAL ARTERY ECHO: CPT | Performed by: OBSTETRICS & GYNECOLOGY

## 2017-06-10 PROCEDURE — 12000028 ZZH R&B OB UMMC

## 2017-06-10 PROCEDURE — 83735 ASSAY OF MAGNESIUM: CPT | Performed by: OBSTETRICS & GYNECOLOGY

## 2017-06-10 RX ADMIN — ACETAMINOPHEN 650 MG: 325 TABLET, FILM COATED ORAL at 17:30

## 2017-06-10 RX ADMIN — ACETAMINOPHEN 650 MG: 325 TABLET, FILM COATED ORAL at 02:18

## 2017-06-10 RX ADMIN — PRENATAL VIT W/ FE FUMARATE-FA TAB 27-0.8 MG 1 TABLET: 27-0.8 TAB at 17:30

## 2017-06-10 RX ADMIN — METOCLOPRAMIDE 10 MG: 10 TABLET ORAL at 17:33

## 2017-06-10 RX ADMIN — MAGNESIUM SULFATE IN WATER 1 G/HR: 40 INJECTION, SOLUTION INTRAVENOUS at 08:38

## 2017-06-10 RX ADMIN — METOCLOPRAMIDE 10 MG: 10 TABLET ORAL at 02:18

## 2017-06-10 NOTE — PROGRESS NOTES
"[facility] Antepartum Progress Note    Nhi Alexander MRN# [id number]   Age: 32 year old  Gestational age: 24w1d YOB: 1985       Date of Admission: 6/8/2017          Subjective:      Nhi is not feeling well this morning. She attests to feeling \"fuzzy\" and as though she is almost hovering over her bed.. She is not experiencing any sparkles or spots in her vision or blind spots. Her headache has improved. She does feel as though she is urinating less today. She is experiencing weakness. She woke up 3-4x overnight feeling like she was not breathing but was then able to fall back asleep with the head of her bed slightly elevated. She is not experiencing chest pain or abdominal pain. She does feel more swollen than yesterday. Denies contractions, leaking of fluid or vaginal bleeding.  Reports good fetal movement.         Objective:          Patient Vitals for the past 24 hrs:   BP Temp Temp src Pulse Resp SpO2 Weight   06/10/17 0800 - 98  F (36.7  C) Oral 80 22 97 % -   06/10/17 0700 - - - - - - 111.6 kg (246 lb 1.6 oz)   06/10/17 0559 132/76 - - - - - -   06/10/17 0153 136/83 97.6  F (36.4  C) Oral - 18 - -   06/09/17 2148 140/80 97.8  F (36.6  C) Oral - - - -   06/09/17 1900 148/80 98.3  F (36.8  C) Oral - - - -   06/09/17 1509 144/89 - - - - - -     Gen: Appears uncomfortable today in comparison to previous, lying in bed  CV: Regular rate and rhythm without appreciable murmurs.    Resp: Non-labored breathing.  Appropriate inspiratory effort. Slight crackles present over Right lung base. No wheezes or rhonchi.   Abdomen: Gravid, Soft, Non-tender  Neuro: 3+ patellar reflexes b/l   LE: 1-2+ pitting edema to pre-tibia      Fetal Heart Rate Tracing:   Baseline 130  Variability: Minimal to moderate  Accelerations: Not Present  Decelerations: None    Tocometer: Quiet     Labs:      Component      Latest Ref Rng & Units 6/9/2017 6/9/2017 6/9/2017           9:36 AM  3:16 PM  3:30 PM   Sodium      " "133 - 144 mmol/L 139 140    Potassium      3.4 - 5.3 mmol/L 4.2 4.2    Chloride      94 - 109 mmol/L 106 105    Carbon Dioxide      20 - 32 mmol/L 23 22    Anion Gap      3 - 14 mmol/L 10 13    Glucose      70 - 99 mg/dL 128 (H) 98    Urea Nitrogen      7 - 30 mg/dL 8 12    Creatinine      0.52 - 1.04 mg/dL 0.78 0.71    GFR Estimate      >60 mL/min/1.7m2 85 >90 . . .    GFR Estimate If Black      >60 mL/min/1.7m2 >90 . . . >90 . . .    Calcium      8.5 - 10.1 mg/dL 7.4 (L) 7.2 (L)    Bilirubin Total      0.2 - 1.3 mg/dL 0.2 0.2    Albumin      3.4 - 5.0 g/dL 2.7 (L) 2.9 (L)    Protein Total      6.8 - 8.8 g/dL 6.5 (L) 6.7 (L)    Alkaline Phosphatase      40 - 150 U/L 72 67    ALT      0 - 50 U/L 18 19    AST      0 - 45 U/L 13 16    WBC      4.0 - 11.0 10e9/L 16.0 (H) 16.7 (H)    RBC Count      3.8 - 5.2 10e12/L 4.20 4.33    Hemoglobin      11.7 - 15.7 g/dL 13.1 13.7    Hematocrit      35.0 - 47.0 % 38.0 39.1    MCV      78 - 100 fl 91 90    MCH      26.5 - 33.0 pg 31.2 31.6    MCHC      31.5 - 36.5 g/dL 34.5 35.0    RDW      10.0 - 15.0 % 13.3 13.3    Platelet Count      150 - 450 10e9/L 232 239    Creatinine Urine      mg/dL   20   Creatinine Urine Timed      0.80 - 1.80   1.29   Volume in mL      mL   6400   Duration in hours      h   24.0   Protein Random Urine      g/L   0.05   Protein Total 24 Hr Urine      0.04 - 0.23   0.35 (H)   Protein Total Urine g/gr Creatinine      0 - 0.2 g/g Cr   0.27 (H)   Treponema pallidum Antibody      NEG Negative     CMV Antibody IgG      0.0 - 0.8 AI <0.2 . . .     CMV Antibody IgM      0.0 - 0.8 AI <0.2 . . .     N-Terminal Pro BNP Inpatient      0 - 450 pg/mL 126         Recent US:   6/10/2017 BPP: Please see \"Imaging\" tab under \"Chart Review\" for results of today's ultrasound.            Assessment/Plan:   Nhi Alexander is a 32 year old  at 24w2d by IVF dating who is HD#3 admitted with fetal growth restriction and abnormal fetal dopplers as well as pre-eclampsia " with severe features (based on BPs and urine protein).     Pre-eclampsia with severe features:   - Denies severe symptoms this AM (HA resolved)  - Severe range blood pressure in clinic and upon initial admission but then mild range since magnesium started  - Urine 24 hour protein elevated @ 0.35 g/L.  - HELLP labs wnl  - Will redraw preeclampsia labs and mag level given patients symptoms.  Also with decreased breath sounds in the left lower lung base so chest x-ray was ordered.  Mag decreased to 1g/hour due to her symptoms while awaiting stat mag level, mag toxicity less likely as reflexes were 3+.  Will continue today though BMZ window (24 hours after 2nd dose). Ordering a magnesium level , CMP, and CBC with platelets and chest x-ray.    FWB:   - FHT appropriate for GA  - S/p #1/2 BMZ at 1443 on 6/8, #2/2 at 1453 on 6/9  - US on 6/8: EFW 15%ile at 469 gms, AC <2%ile, UAR with intermittent REDF (improved from persistent reversal on 6/8).  - continue with daily BPP/dopplers   - magnesium for neuroprotection  - NNP consultated   - Fetal Echo on 6/8 WNL (IVF pregnancy)    IUGR:   - With recent URI (3 weeks ago) and new onset of fetal growth restriction TORCH titers including CMV/Toxo ordered and pending   - C/S consent signed on 6/8    Other:   - AMA; normal Verifi  - Rh positive, RI  - Will need GCT after betamethasone window  - GBS pending    I served as a scribe for Dr. Gallegos for this visit.     Annamarie Barnes MS4    Addendum @ 1050  Patient's CXR returned wnl. No signs of pulmonary edema. Additionally, labs wnl. Mag level therapeutic at 7.4. AST 16, ALT 20. Platelets 274. Patient is currently feeling less weak and fuzzy now that the magnesium has been turned down. Plan to keep it running at 1 gm/hour until approximately 1453 at which point she will be through the betamethasone window. Will continue to monitor. Patient counseled on signs and symptoms to watch for.           Attending Attestation:  The documentation  recorded by the scribe accurately reflects the services I personally performed and the decisions made by me.      I spent 15 minutes total face-to-face with this inpatient, and >50% of the time was spent in counseling and/or coordination of care.    Jessika Gallegos, DO  Maternal Fetal Medicine

## 2017-06-10 NOTE — PLAN OF CARE
Problem: Hypertensive Disorders in Pregnancy (Adult,Obstetrics,Pediatric)  Goal: Signs and Symptoms of Listed Potential Problems Will be Absent or Manageable (Hypertensive Disorders in Pregnancy)  Signs and symptoms of listed potential problems will be absent or manageable by discharge/transition of care (reference Hypertensive Disorders in Pregnancy (Adult,Obstetrics,Pediatric) CPG).   Outcome: Improving  Patient denies cramping, leaking of fluid, bright spots in her vision. Headache on and off, managed with tylenol. 3+ pitting edema present in ankles/feet bilaterally. Magnesium infusing and monitoring continuously. FHR baseline 135, minimal variability, no accelerations, no decelerations. Patient was brought off unit by Nurse Supervisor to say goodbye to her cat who will be put to sleep tomorrow. Vitals stable. Report given to oncoming RN.

## 2017-06-10 NOTE — PLAN OF CARE
Problem: Goal Outcome Summary  Goal: Goal Outcome Summary  Outcome: Improving  VSS.  BP's 130's/80's.  Patient able to rest between cares.  Tylenol and Reglan given for headache and sleep with reported relief.  Patient reported waking with nausea at 0530, was relieved with food and repositioning.  FHTs 130's, minimal variability, no accelerations, some variable decelerations noted.  No contractions.  Magnesium to continue 24 hours post second betamethasone administration.

## 2017-06-10 NOTE — PROVIDER NOTIFICATION
Pt reports feeling like she is floating, not feeling right. Alert and oriented. Wants to have the Magnesium Sulfate turned off.  Will get a stat mag level.

## 2017-06-10 NOTE — PROVIDER NOTIFICATION
Pt complaining of all extremities being very heavy and hard to lift.  Hard to  her water container to take a drink.

## 2017-06-10 NOTE — PLAN OF CARE
Problem: Hypertensive Disorders in Pregnancy (Adult,Obstetrics,Pediatric)  Goal: Signs and Symptoms of Listed Potential Problems Will be Absent or Manageable (Hypertensive Disorders in Pregnancy)  Signs and symptoms of listed potential problems will be absent or manageable by discharge/transition of care (reference Hypertensive Disorders in Pregnancy (Adult,Obstetrics,Pediatric) CPG).   Outcome: Improving  Pt feels much better, no longer feels like she is floating, able to move her extremities. Able to focus .  Continue to monitor closely for changes.

## 2017-06-11 ENCOUNTER — HOSPITAL ENCOUNTER (INPATIENT)
Dept: ULTRASOUND IMAGING | Facility: CLINIC | Age: 32
End: 2017-06-11
Payer: COMMERCIAL

## 2017-06-11 LAB
ALT SERPL W P-5'-P-CCNC: 16 U/L (ref 0–50)
AST SERPL W P-5'-P-CCNC: 16 U/L (ref 0–45)
CREAT SERPL-MCNC: 0.66 MG/DL (ref 0.52–1.04)
ERYTHROCYTE [DISTWIDTH] IN BLOOD BY AUTOMATED COUNT: 13.4 % (ref 10–15)
GFR SERPL CREATININE-BSD FRML MDRD: NORMAL ML/MIN/1.7M2
HCT VFR BLD AUTO: 38.1 % (ref 35–47)
HGB BLD-MCNC: 12.7 G/DL (ref 11.7–15.7)
MCH RBC QN AUTO: 30.5 PG (ref 26.5–33)
MCHC RBC AUTO-ENTMCNC: 33.3 G/DL (ref 31.5–36.5)
MCV RBC AUTO: 92 FL (ref 78–100)
PLATELET # BLD AUTO: 220 10E9/L (ref 150–450)
RBC # BLD AUTO: 4.16 10E12/L (ref 3.8–5.2)
WBC # BLD AUTO: 11.4 10E9/L (ref 4–11)

## 2017-06-11 PROCEDURE — 84460 ALANINE AMINO (ALT) (SGPT): CPT | Performed by: OBSTETRICS & GYNECOLOGY

## 2017-06-11 PROCEDURE — 25000132 ZZH RX MED GY IP 250 OP 250 PS 637: Performed by: STUDENT IN AN ORGANIZED HEALTH CARE EDUCATION/TRAINING PROGRAM

## 2017-06-11 PROCEDURE — 90715 TDAP VACCINE 7 YRS/> IM: CPT | Performed by: STUDENT IN AN ORGANIZED HEALTH CARE EDUCATION/TRAINING PROGRAM

## 2017-06-11 PROCEDURE — 87653 STREP B DNA AMP PROBE: CPT | Performed by: STUDENT IN AN ORGANIZED HEALTH CARE EDUCATION/TRAINING PROGRAM

## 2017-06-11 PROCEDURE — 82565 ASSAY OF CREATININE: CPT | Performed by: OBSTETRICS & GYNECOLOGY

## 2017-06-11 PROCEDURE — 12000028 ZZH R&B OB UMMC

## 2017-06-11 PROCEDURE — 76821 MIDDLE CEREBRAL ARTERY ECHO: CPT | Performed by: OBSTETRICS & GYNECOLOGY

## 2017-06-11 PROCEDURE — 76819 FETAL BIOPHYS PROFIL W/O NST: CPT

## 2017-06-11 PROCEDURE — 87186 SC STD MICRODIL/AGAR DIL: CPT | Performed by: STUDENT IN AN ORGANIZED HEALTH CARE EDUCATION/TRAINING PROGRAM

## 2017-06-11 PROCEDURE — 25000125 ZZHC RX 250: Performed by: STUDENT IN AN ORGANIZED HEALTH CARE EDUCATION/TRAINING PROGRAM

## 2017-06-11 PROCEDURE — 84450 TRANSFERASE (AST) (SGOT): CPT | Performed by: OBSTETRICS & GYNECOLOGY

## 2017-06-11 PROCEDURE — 36415 COLL VENOUS BLD VENIPUNCTURE: CPT | Performed by: OBSTETRICS & GYNECOLOGY

## 2017-06-11 PROCEDURE — 85027 COMPLETE CBC AUTOMATED: CPT | Performed by: OBSTETRICS & GYNECOLOGY

## 2017-06-11 RX ADMIN — CLOSTRIDIUM TETANI TOXOID ANTIGEN (FORMALDEHYDE INACTIVATED), CORYNEBACTERIUM DIPHTHERIAE TOXOID ANTIGEN (FORMALDEHYDE INACTIVATED), BORDETELLA PERTUSSIS TOXOID ANTIGEN (GLUTARALDEHYDE INACTIVATED), BORDETELLA PERTUSSIS FILAMENTOUS HEMAGGLUTININ ANTIGEN (FORMALDEHYDE INACTIVATED), BORDETELLA PERTUSSIS PERTACTIN ANTIGEN, AND BORDETELLA PERTUSSIS FIMBRIAE 2/3 ANTIGEN 0.5 ML: 5; 2; 2.5; 5; 3; 5 INJECTION, SUSPENSION INTRAMUSCULAR at 17:15

## 2017-06-11 RX ADMIN — ACETAMINOPHEN 650 MG: 325 TABLET, FILM COATED ORAL at 20:44

## 2017-06-11 NOTE — PLAN OF CARE
Problem: Goal Outcome Summary  Goal: Goal Outcome Summary  Outcome: No Change  Data: VSS and blood pressures within parameters. Signs and symptoms of pre-eclampsia: 3+ edema in bilateral L/E's, 1+ generalized edema. L/E Reflexes +3 with no clonus.  Denies HA overnight, after Tylenol and Reglan given last evening for HA.  Fetal assessment Appropriate for Gestational Age.  Interventions: Monitor blood pressures and indicators of pre-eclampsia every 4 hours. Continue uterine/fetal assessment every shift. Activity level: Bed rest with bathroom privileges.  Encourage and frequent position changes.  Plan: Continue expectant management. BPP with Dopplers in am. Observe for and notify care provider of indicators of worsening pre-eclampsia or signs of fetal/maternal compromise.

## 2017-06-11 NOTE — PLAN OF CARE
Problem: Hypertensive Disorders in Pregnancy (Adult,Obstetrics,Pediatric)  Goal: Signs and Symptoms of Listed Potential Problems Will be Absent or Manageable (Hypertensive Disorders in Pregnancy)  Signs and symptoms of listed potential problems will be absent or manageable by discharge/transition of care (reference Hypertensive Disorders in Pregnancy (Adult,Obstetrics,Pediatric) CPG).   Outcome: No Change  Pt is comfortable and stable.  Pt denies any headache, vision changes or epigastric pain.  Pt has edema present in her lower legs/ankles/feet.  BPs are stable.  Pt reports feeling much better than yesterday while she was on MgSO4.  Pt requesting to take her own prenatal gummy bears w omega 3.  There is no iron in the vitamins.  Dr. Rosas updated  - pt may take her own.  Pharmacy approved and put a med label on the bottle.  Pt has family visiting today.  Plan to monitor when pt is done with her meal.  GBS needs to be collected since 6/8 order.  I will pass on to next shift if unable to collect this shift.  Call light is within reach.     min - mod variablity.  No decels with accels present.  AGA.

## 2017-06-11 NOTE — PROGRESS NOTES
Maternal Fetal Medicine Antepartum Progress Note            Subjective:     Feeling much better this morning now she is off the magnesium.  Her swelling has also significantly resolved.  Denies HA, changes in vision, RUQ pain, epigastric pain, SOB or chest pain, contractions, vaginal bleeding, leaking of fluid.  Reports good fetal movement.  Has continued to have muscle ache in her neck from sleeping wrong on it, was planning on going to outpatient chiropractor and is wondering what services we may have.                   Objective:          Patient Vitals for the past 24 hrs:   BP Temp Temp src Pulse Resp Weight   06/11/17 1242 (!) 143/94 - - 67 16 -   06/11/17 1236 (!) 141/95 99  F (37.2  C) Oral 78 16 -   06/11/17 0800 144/78 98.8  F (37.1  C) Oral 63 16 -   06/11/17 0651 - - - - - 110.6 kg (243 lb 14.4 oz)   06/11/17 0008 135/74 98.7  F (37.1  C) Oral - 16 -   06/10/17 2144 151/89 98.4  F (36.9  C) Oral - 18 -   06/10/17 1725 119/74 98.7  F (37.1  C) Oral 78 16 -   06/10/17 1500 158/85 98.6  F (37  C) Oral 67 18 -     Gen: Well-appearing, NAD  CV: Regular rate and rhythm without appreciable murmurs.    Resp: Non-labored breathing.  Appropriate inspiratory effort. Lung sounds are clear to auscultation bilaterally.    Abdomen: Gravid, Soft, Non-tender  Neuro: 2+ patellar reflexes b/l   LE: 1-2+ pitting edema to pre-tibia     Fetal Heart Rate Tracing:   Baseline 130  Variability: Minimal to moderate  Accelerations: Not Present  Decelerations: None    Tocometer: Quiet     Hemoglobin   Date Value Ref Range Status   06/11/2017 12.7 11.7 - 15.7 g/dL Final   06/10/2017 14.0 11.7 - 15.7 g/dL Final   06/09/2017 13.7 11.7 - 15.7 g/dL Final   06/09/2017 13.1 11.7 - 15.7 g/dL Final     Platelet Count   Date Value Ref Range Status   06/11/2017 220 150 - 450 10e9/L Final   06/10/2017 274 150 - 450 10e9/L Final   06/09/2017 239 150 - 450 10e9/L Final   06/09/2017 232 150 - 450 10e9/L Final     AST   Date Value Ref Range Status  "  2017 16 0 - 45 U/L Final   06/10/2017 16 0 - 45 U/L Final   2017 16 0 - 45 U/L Final   2017 13 0 - 45 U/L Final     ALT   Date Value Ref Range Status   2017 16 0 - 50 U/L Final   06/10/2017 20 0 - 50 U/L Final   2017 19 0 - 50 U/L Final   2017 18 0 - 50 U/L Final     Creatinine   Date Value Ref Range Status   2017 0.66 0.52 - 1.04 mg/dL Final   06/10/2017 0.77 0.52 - 1.04 mg/dL Final   2017 0.71 0.52 - 1.04 mg/dL Final   2017 0.78 0.52 - 1.04 mg/dL Final     Please see \"Imaging\" tab under \"Chart Review\" for details of today's US.            Assessment/Plan:   Nhi Alexander is a 32 year old  at 24w3d by IVF dating who is HD#4 admitted with preeclampsia with severe features (BP and urine protein) fetal growth restriction (AC 2%tile) with persistently reversed end diastolic dopplers (now improved to intermittently absent).    Pre-eclampsia with severe features:   - No signs or symptoms of severe disease  - BP have remained in the mild range since her initial severe ranges on admission.  - s/p magnesium for seizure prophylaxis  - HELLP labs wnl- will redraw on a weekly basis or sooner if clinically indicated    Fetal growth restriction, with abnormal umbilical artery doppler:  - FHT appropriate for gestational age  - S/p #1/2 BMZ at 1443 on , #2/2 at 1453 on   - s/p magnesium for neuro-protection, will repeat if delivery anticipated < 32 weeks.  - s/p NICU consultation  - US on : EFW 15%ile at 469 gms, AC <2%ile, repeat growth ultrasound in 3 weeks  - continue with daily BPP/dopplers   - New onset of fetal growth restriction and URI 3 weeks ago- CMV negative, Toxo pending   - C/S consent signed on      Musculoskeletal neck pain:  - PT ordered for eval and treat  - Order placed for alternative medicine- Acupuncture and massage    PNC:   - AMA; normal Verifi  - Rh positive, RI  - Will need GCT after betamethasone window  - GBS will be collected " today as this has not been done  - Tdap ordered    I spent 15 minutes total face-to-face with this inpatient, and >50% of the time was spent in counseling and/or coordination of care.    Jessika Gallegos, DO  Maternal Fetal Medicine

## 2017-06-12 ENCOUNTER — HOSPITAL ENCOUNTER (INPATIENT)
Dept: ULTRASOUND IMAGING | Facility: CLINIC | Age: 32
End: 2017-06-12
Payer: COMMERCIAL

## 2017-06-12 ENCOUNTER — APPOINTMENT (OUTPATIENT)
Dept: PHYSICAL THERAPY | Facility: CLINIC | Age: 32
End: 2017-06-12
Payer: COMMERCIAL

## 2017-06-12 LAB
ALT SERPL W P-5'-P-CCNC: 20 U/L (ref 0–50)
AST SERPL W P-5'-P-CCNC: 15 U/L (ref 0–45)
CREAT SERPL-MCNC: 0.76 MG/DL (ref 0.52–1.04)
ERYTHROCYTE [DISTWIDTH] IN BLOOD BY AUTOMATED COUNT: 13.2 % (ref 10–15)
GFR SERPL CREATININE-BSD FRML MDRD: 87 ML/MIN/1.7M2
GP B STREP DNA SPEC QL NAA+PROBE: ABNORMAL
HCT VFR BLD AUTO: 39.7 % (ref 35–47)
HGB BLD-MCNC: 13.6 G/DL (ref 11.7–15.7)
MCH RBC QN AUTO: 31.4 PG (ref 26.5–33)
MCHC RBC AUTO-ENTMCNC: 34.3 G/DL (ref 31.5–36.5)
MCV RBC AUTO: 92 FL (ref 78–100)
PLATELET # BLD AUTO: 211 10E9/L (ref 150–450)
RBC # BLD AUTO: 4.33 10E12/L (ref 3.8–5.2)
SPECIMEN SOURCE: ABNORMAL
WBC # BLD AUTO: 11.8 10E9/L (ref 4–11)

## 2017-06-12 PROCEDURE — 25000132 ZZH RX MED GY IP 250 OP 250 PS 637: Performed by: STUDENT IN AN ORGANIZED HEALTH CARE EDUCATION/TRAINING PROGRAM

## 2017-06-12 PROCEDURE — 36415 COLL VENOUS BLD VENIPUNCTURE: CPT | Performed by: OBSTETRICS & GYNECOLOGY

## 2017-06-12 PROCEDURE — 25000128 H RX IP 250 OP 636: Performed by: OBSTETRICS & GYNECOLOGY

## 2017-06-12 PROCEDURE — 25000125 ZZHC RX 250: Performed by: STUDENT IN AN ORGANIZED HEALTH CARE EDUCATION/TRAINING PROGRAM

## 2017-06-12 PROCEDURE — 85027 COMPLETE CBC AUTOMATED: CPT | Performed by: OBSTETRICS & GYNECOLOGY

## 2017-06-12 PROCEDURE — 97140 MANUAL THERAPY 1/> REGIONS: CPT | Mod: GP | Performed by: PHYSICAL THERAPIST

## 2017-06-12 PROCEDURE — 84450 TRANSFERASE (AST) (SGOT): CPT | Performed by: OBSTETRICS & GYNECOLOGY

## 2017-06-12 PROCEDURE — 82565 ASSAY OF CREATININE: CPT | Performed by: OBSTETRICS & GYNECOLOGY

## 2017-06-12 PROCEDURE — 25000132 ZZH RX MED GY IP 250 OP 250 PS 637: Performed by: OBSTETRICS & GYNECOLOGY

## 2017-06-12 PROCEDURE — 76819 FETAL BIOPHYS PROFIL W/O NST: CPT

## 2017-06-12 PROCEDURE — 84460 ALANINE AMINO (ALT) (SGPT): CPT | Performed by: OBSTETRICS & GYNECOLOGY

## 2017-06-12 PROCEDURE — 40000193 ZZH STATISTIC PT WARD VISIT: Performed by: PHYSICAL THERAPIST

## 2017-06-12 PROCEDURE — 76820 UMBILICAL ARTERY ECHO: CPT | Performed by: OBSTETRICS & GYNECOLOGY

## 2017-06-12 PROCEDURE — 12000028 ZZH R&B OB UMMC

## 2017-06-12 PROCEDURE — 97110 THERAPEUTIC EXERCISES: CPT | Mod: GP | Performed by: PHYSICAL THERAPIST

## 2017-06-12 PROCEDURE — 97161 PT EVAL LOW COMPLEX 20 MIN: CPT | Mod: GP | Performed by: PHYSICAL THERAPIST

## 2017-06-12 RX ORDER — METOCLOPRAMIDE HYDROCHLORIDE 5 MG/ML
10 INJECTION INTRAMUSCULAR; INTRAVENOUS EVERY 6 HOURS PRN
Status: DISCONTINUED | OUTPATIENT
Start: 2017-06-12 | End: 2017-06-14

## 2017-06-12 RX ORDER — LIDOCAINE 40 MG/G
CREAM TOPICAL
Status: DISCONTINUED | OUTPATIENT
Start: 2017-06-12 | End: 2017-06-12

## 2017-06-12 RX ORDER — OXYCODONE HYDROCHLORIDE 5 MG/1
5 TABLET ORAL ONCE
Status: COMPLETED | OUTPATIENT
Start: 2017-06-12 | End: 2017-06-12

## 2017-06-12 RX ORDER — NIFEDIPINE 30 MG/1
30 TABLET, EXTENDED RELEASE ORAL DAILY
Status: DISCONTINUED | OUTPATIENT
Start: 2017-06-13 | End: 2017-06-13

## 2017-06-12 RX ORDER — NIFEDIPINE 10 MG/1
10 CAPSULE ORAL ONCE
Status: COMPLETED | OUTPATIENT
Start: 2017-06-12 | End: 2017-06-12

## 2017-06-12 RX ORDER — DIPHENHYDRAMINE HCL 25 MG
50 CAPSULE ORAL EVERY 6 HOURS PRN
Status: DISCONTINUED | OUTPATIENT
Start: 2017-06-12 | End: 2017-06-14

## 2017-06-12 RX ORDER — NIFEDIPINE 30 MG/1
30 TABLET, EXTENDED RELEASE ORAL DAILY
Status: DISCONTINUED | OUTPATIENT
Start: 2017-06-12 | End: 2017-06-12

## 2017-06-12 RX ORDER — BUTALBITAL, ACETAMINOPHEN AND CAFFEINE 50; 325; 40 MG/1; MG/1; MG/1
1 TABLET ORAL ONCE
Status: COMPLETED | OUTPATIENT
Start: 2017-06-12 | End: 2017-06-12

## 2017-06-12 RX ORDER — ZOLPIDEM TARTRATE 5 MG/1
5 TABLET ORAL
Status: DISCONTINUED | OUTPATIENT
Start: 2017-06-12 | End: 2017-06-12

## 2017-06-12 RX ORDER — ZOLPIDEM TARTRATE 5 MG/1
5 TABLET ORAL
Status: DISCONTINUED | OUTPATIENT
Start: 2017-06-12 | End: 2017-06-14

## 2017-06-12 RX ORDER — DIPHENHYDRAMINE HYDROCHLORIDE 50 MG/ML
50 INJECTION INTRAMUSCULAR; INTRAVENOUS EVERY 6 HOURS PRN
Status: DISCONTINUED | OUTPATIENT
Start: 2017-06-12 | End: 2017-06-14

## 2017-06-12 RX ADMIN — HYDRALAZINE HYDROCHLORIDE 5 MG: 20 INJECTION INTRAMUSCULAR; INTRAVENOUS at 23:06

## 2017-06-12 RX ADMIN — BUTALBITAL, ACETAMINOPHEN, AND CAFFEINE 1 TABLET: 50; 325; 40 TABLET ORAL at 15:59

## 2017-06-12 RX ADMIN — ACETAMINOPHEN 650 MG: 325 TABLET, FILM COATED ORAL at 14:15

## 2017-06-12 RX ADMIN — OXYCODONE HYDROCHLORIDE 5 MG: 5 TABLET ORAL at 22:42

## 2017-06-12 RX ADMIN — HYDRALAZINE HYDROCHLORIDE 5 MG: 20 INJECTION INTRAMUSCULAR; INTRAVENOUS at 13:35

## 2017-06-12 RX ADMIN — METOCLOPRAMIDE 10 MG: 5 INJECTION, SOLUTION INTRAMUSCULAR; INTRAVENOUS at 23:20

## 2017-06-12 RX ADMIN — PRENATAL VIT W/ FE FUMARATE-FA TAB 27-0.8 MG 1 TABLET: 27-0.8 TAB at 08:19

## 2017-06-12 RX ADMIN — ACETAMINOPHEN 650 MG: 325 TABLET, FILM COATED ORAL at 09:32

## 2017-06-12 RX ADMIN — NIFEDIPINE 30 MG: 30 TABLET, FILM COATED, EXTENDED RELEASE ORAL at 13:40

## 2017-06-12 RX ADMIN — ACETAMINOPHEN 650 MG: 325 TABLET, FILM COATED ORAL at 04:11

## 2017-06-12 RX ADMIN — DIPHENHYDRAMINE HYDROCHLORIDE 50 MG: 50 INJECTION, SOLUTION INTRAMUSCULAR; INTRAVENOUS at 23:23

## 2017-06-12 RX ADMIN — NIFEDIPINE 10 MG: 10 CAPSULE, LIQUID FILLED ORAL at 08:57

## 2017-06-12 RX ADMIN — ZOLPIDEM TARTRATE 5 MG: 5 TABLET, FILM COATED ORAL at 20:06

## 2017-06-12 NOTE — PROGRESS NOTES
Maternal Fetal Medicine Antepartum Progress Note       Subjective: Patient feeling well this morning. She does attest to poor sleep overnight due to persistent head and neck pain. She does feel as though this pain has worsened since stopping the magnesium but she has had the pain since prior to admission and believes it started after sleeping on it wrong. It worsens with movement. She has not experienced any visual changes or abdominal pain. She is not experiencing contractions, vaginal bleeding, or LOF. She does wish to speak to a financial counselor regarding insurance coverage for her hospital stay. She has no other questions or concerns.     Objective:   Patient Vitals for the past 24 hrs:   BP Temp Temp src Pulse Resp   06/12/17 0930 149/85 - - - -   06/12/17 0844 158/69 - - - -   06/12/17 0832 166/74 - - - -   06/12/17 0822 171/90 98.4  F (36.9  C) Oral - 18   06/12/17 0413 147/75 98.6  F (37  C) Oral - 18   06/11/17 2351 142/83 98.1  F (36.7  C) Oral - 20   06/11/17 2030 144/80 - - - 20   06/11/17 1741 134/69 98.8  F (37.1  C) Oral - 20   06/11/17 1708 (!) 174/91 - - - 24   06/11/17 1242 (!) 143/94 - - 67 16   06/11/17 1236 (!) 141/95 99  F (37.2  C) Oral 78 16     Gen: Well-appearing, NAD, lying in bed comfortably, mother at bedside   CV: Regular rate and rhythm, no murmurs, rubs, or gallops.    Resp: Non-labored breathing.  Appropriate inspiratory effort. Lung sounds are clear to auscultation bilaterally.    Abdomen: Gravid, Soft, Non-tender to palpation, non-distended  LE: 2+ pedal edema bilaterally     Fetal Heart Rate Tracing:   Baseline 150  Variability: Moderate   Accelerations: Not Present  Decelerations: None    Tocometer: No contractions     Hemoglobin   Date Value Ref Range Status   06/12/2017 13.6 11.7 - 15.7 g/dL Final   06/11/2017 12.7 11.7 - 15.7 g/dL Final   06/10/2017 14.0 11.7 - 15.7 g/dL Final   06/09/2017 13.7 11.7 - 15.7 g/dL Final     Platelet Count   Date Value Ref Range Status  "  2017 211 150 - 450 10e9/L Final   2017 220 150 - 450 10e9/L Final   06/10/2017 274 150 - 450 10e9/L Final   2017 239 150 - 450 10e9/L Final     AST   Date Value Ref Range Status   2017 15 0 - 45 U/L Final   2017 16 0 - 45 U/L Final   06/10/2017 16 0 - 45 U/L Final   2017 16 0 - 45 U/L Final     ALT   Date Value Ref Range Status   2017 20 0 - 50 U/L Final   2017 16 0 - 50 U/L Final   06/10/2017 20 0 - 50 U/L Final   2017 19 0 - 50 U/L Final     Creatinine   Date Value Ref Range Status   2017 0.76 0.52 - 1.04 mg/dL Final   2017 0.66 0.52 - 1.04 mg/dL Final   06/10/2017 0.77 0.52 - 1.04 mg/dL Final   2017 0.71 0.52 - 1.04 mg/dL Final       Imaging: Please see \"Imaging\" tab under \"Chart Review\" for details of today's US.    Assessment/Plan:   Nhi Alexander is a 32 year old  at 24w4d by IVF dating who is HD#5 s/p admission for preeclampsia with severe features (BP and 24-hour urine protein) fetal growth restriction (AC 2%tile) with persistently reversed end diastolic dopplers (now improved to intermittently absent).    Pre-eclampsia with severe features:   - BP elevated this AM to severe range at 174/91. Will give 10 mg PO Nifedipine immediate release and monitor closely. Will begin Procardia XL 30 mg  - S/p magnesium for seizure prophylaxis  - HELLP labs wnl- will redraw on a weekly basis or sooner if clinically indicated    Fetal growth restriction, with abnormal umbilical artery doppler:  - FHT appropriate for gestational age  - S/p #1/2 BMZ at 1443 on , #2/2 at 1453 on   - S/p magnesium for neuro-protection, will repeat if delivery anticipated < 32 weeks.  - S/p NICU consultation  - US on : EFW 15%ile at 469 gms, AC <2%ile, repeat growth ultrasound in 3 weeks  - Continue with daily BPP/dopplers   - New onset of fetal growth restriction and URI 3 weeks ago- CMV negative, Toxo pending   - C/S consent signed on " 6/8     Musculoskeletal neck pain:  - PT ordered for eval and treat  - Order placed for alternative medicine- Acupuncture and massage    PNC:   - AMA; normal Verifi  - Rh positive, RI  - Will need GCT 2 weeks after betamethasone window (last betamethasone 6/9)  - GBS pending   - Tdap given 6/9    I served as a scribe for Dr. Bran for this visit.     Annamarie Barnes MS4  [employer] [facility] [employer]   Maternal-Fetal Medicine  Pager: [phone]    This note, as scribed, accurately reflects the examination, my impressions and plan as discussed with the patient.    Emanuel Bran MD  Maternal-Fetal Medicine

## 2017-06-12 NOTE — PLAN OF CARE
Problem: Individualization  Goal: Patient Preferences  Pt awake, desires check before bed. VSS, afebrile. Concerned re: painful, reddened, bruised IV. Requests IV dc with restart as needed, IV pulled with bandaid to cover.   Pt settled for sleep, will call with c/o in the night.

## 2017-06-12 NOTE — PLAN OF CARE
The EMR was down for 4.5 hours on 6/12/2017.    Nathalie ARANGO was responsible for completing the paper charting during this time period.     The following information was re-entered into the system by Bety Michael: last set of vital signs, Flowsheet data and MAR    The following information will remain in the paper chart: Notes, previous vital signs.    Bety Michael  6/12/2017

## 2017-06-12 NOTE — PLAN OF CARE
Patient reports that headache is resolving, vibration sensation in abdomen has resolved with positional change.  Plan to take shower, eat an early dinner and take an ambien to help sleep and try to go to sleep early this evening.

## 2017-06-12 NOTE — PLAN OF CARE
Problem: Goal Outcome Summary  Goal: Goal Outcome Summary  Outcome: No Change  Pt denies contractions, leaking of fluid, bleeding. States baby is active. Says edema is getting better in her legs. BP after ambulating in room was 170s/90s, recheck after laying on her side was 130s/60s, next BP check 4 hours later was 140/80s. Monitoring shows moderate variability with occasional variables, no accels. Tylenol x1 for neck/head discomfort that has been ongoing. GBS sent tonight and TDap given. Awaiting NICU consult when it can be coordinated with pts . Continue to monitor.

## 2017-06-12 NOTE — PLAN OF CARE
Pt sleeping intermittently re: neck ache. VS checked, afebrile, given tylenol 650mg (po).   Pt declined offer of heat or cold to neck, will contact RN for comfort needs prn. FOB bringing pillow from home at some point.  Will continue to monitor, offer support.

## 2017-06-12 NOTE — PROGRESS NOTES
MFM Note - Late entry due to EPIC Downtime    CTSP for worsening HA     Patient describes increasing HA throughout day and now 10/10. Not associated with any change in vision. Has had neck pain for 3 days.     BP: 150s/80s    Patient offered one time dose of oxycodone and declined. Given Fioricet.     Returned to see patient in 45 minutes and LAMA resolving.    Emanuel Bran MD  Maternal-Fetal Medicine

## 2017-06-12 NOTE — PROVIDER NOTIFICATION
06/12/17 0835   Provider Notification   Provider Name/Title Dr. Bran   Method of Notification In Department   Request Evaluate in Person   Notification Reason Maternal Vital Sign Change  (BPs severe range, no IV access)     Pt's /90, repeat 10 min later 166/74.  Patient declined IV access overnight and this morning.  Notified Dr. Bran of patient's severe range BPs and lack of IV access.  PO immediate release nifedipine ordered and given.  Repeat check 30 min later, 149/85.  Will continue to monitor.

## 2017-06-13 ENCOUNTER — ANESTHESIA EVENT (OUTPATIENT)
Dept: OBGYN | Facility: CLINIC | Age: 32
End: 2017-06-13
Payer: COMMERCIAL

## 2017-06-13 ENCOUNTER — ANESTHESIA (OUTPATIENT)
Dept: OBGYN | Facility: CLINIC | Age: 32
End: 2017-06-13
Payer: COMMERCIAL

## 2017-06-13 ENCOUNTER — HOSPITAL ENCOUNTER (INPATIENT)
Dept: ULTRASOUND IMAGING | Facility: CLINIC | Age: 32
End: 2017-06-13
Attending: OBSTETRICS & GYNECOLOGY
Payer: COMMERCIAL

## 2017-06-13 LAB
ABO + RH BLD: NORMAL
ABO + RH BLD: NORMAL
ALT SERPL W P-5'-P-CCNC: 19 U/L (ref 0–50)
ALT SERPL W P-5'-P-CCNC: 22 U/L (ref 0–50)
AST SERPL W P-5'-P-CCNC: 16 U/L (ref 0–45)
AST SERPL W P-5'-P-CCNC: 17 U/L (ref 0–45)
BLD GP AB SCN SERPL QL: NORMAL
BLOOD BANK CMNT PATIENT-IMP: NORMAL
CREAT SERPL-MCNC: 0.66 MG/DL (ref 0.52–1.04)
CREAT SERPL-MCNC: 0.77 MG/DL (ref 0.52–1.04)
ERYTHROCYTE [DISTWIDTH] IN BLOOD BY AUTOMATED COUNT: 13.1 % (ref 10–15)
ERYTHROCYTE [DISTWIDTH] IN BLOOD BY AUTOMATED COUNT: 13.1 % (ref 10–15)
GFR SERPL CREATININE-BSD FRML MDRD: 85 ML/MIN/1.7M2
GFR SERPL CREATININE-BSD FRML MDRD: NORMAL ML/MIN/1.7M2
HCT VFR BLD AUTO: 38.9 % (ref 35–47)
HCT VFR BLD AUTO: 41.4 % (ref 35–47)
HGB BLD-MCNC: 13.2 G/DL (ref 11.7–15.7)
HGB BLD-MCNC: 14.5 G/DL (ref 11.7–15.7)
MCH RBC QN AUTO: 30.8 PG (ref 26.5–33)
MCH RBC QN AUTO: 31.7 PG (ref 26.5–33)
MCHC RBC AUTO-ENTMCNC: 33.9 G/DL (ref 31.5–36.5)
MCHC RBC AUTO-ENTMCNC: 35 G/DL (ref 31.5–36.5)
MCV RBC AUTO: 91 FL (ref 78–100)
MCV RBC AUTO: 91 FL (ref 78–100)
PLATELET # BLD AUTO: 218 10E9/L (ref 150–450)
PLATELET # BLD AUTO: 244 10E9/L (ref 150–450)
RBC # BLD AUTO: 4.28 10E12/L (ref 3.8–5.2)
RBC # BLD AUTO: 4.57 10E12/L (ref 3.8–5.2)
SPECIMEN EXP DATE BLD: NORMAL
WBC # BLD AUTO: 13.4 10E9/L (ref 4–11)
WBC # BLD AUTO: 14.4 10E9/L (ref 4–11)

## 2017-06-13 PROCEDURE — 76821 MIDDLE CEREBRAL ARTERY ECHO: CPT | Performed by: OBSTETRICS & GYNECOLOGY

## 2017-06-13 PROCEDURE — 84450 TRANSFERASE (AST) (SGOT): CPT | Performed by: OBSTETRICS & GYNECOLOGY

## 2017-06-13 PROCEDURE — 12000032 ZZH R&B OB CRITICAL UMMC

## 2017-06-13 PROCEDURE — 82565 ASSAY OF CREATININE: CPT | Performed by: OBSTETRICS & GYNECOLOGY

## 2017-06-13 PROCEDURE — 25000128 H RX IP 250 OP 636: Performed by: STUDENT IN AN ORGANIZED HEALTH CARE EDUCATION/TRAINING PROGRAM

## 2017-06-13 PROCEDURE — 25000132 ZZH RX MED GY IP 250 OP 250 PS 637: Performed by: OBSTETRICS & GYNECOLOGY

## 2017-06-13 PROCEDURE — 36000057 ZZH SURGERY LEVEL 3 1ST 30 MIN - UMMC: Performed by: OBSTETRICS & GYNECOLOGY

## 2017-06-13 PROCEDURE — 86901 BLOOD TYPING SEROLOGIC RH(D): CPT | Performed by: OBSTETRICS & GYNECOLOGY

## 2017-06-13 PROCEDURE — 27210794 ZZH OR GENERAL SUPPLY STERILE: Performed by: OBSTETRICS & GYNECOLOGY

## 2017-06-13 PROCEDURE — 71000014 ZZH RECOVERY PHASE 1 LEVEL 2 FIRST HR: Performed by: OBSTETRICS & GYNECOLOGY

## 2017-06-13 PROCEDURE — 25000128 H RX IP 250 OP 636

## 2017-06-13 PROCEDURE — 36415 COLL VENOUS BLD VENIPUNCTURE: CPT | Performed by: OBSTETRICS & GYNECOLOGY

## 2017-06-13 PROCEDURE — C1765 ADHESION BARRIER: HCPCS | Performed by: OBSTETRICS & GYNECOLOGY

## 2017-06-13 PROCEDURE — 27110028 ZZH OR GENERAL SUPPLY NON-STERILE: Performed by: OBSTETRICS & GYNECOLOGY

## 2017-06-13 PROCEDURE — 71000015 ZZH RECOVERY PHASE 1 LEVEL 2 EA ADDTL HR: Performed by: OBSTETRICS & GYNECOLOGY

## 2017-06-13 PROCEDURE — 85027 COMPLETE CBC AUTOMATED: CPT | Performed by: OBSTETRICS & GYNECOLOGY

## 2017-06-13 PROCEDURE — 25000132 ZZH RX MED GY IP 250 OP 250 PS 637: Performed by: STUDENT IN AN ORGANIZED HEALTH CARE EDUCATION/TRAINING PROGRAM

## 2017-06-13 PROCEDURE — 36000059 ZZH SURGERY LEVEL 3 EA 15 ADDTL MIN UMMC: Performed by: OBSTETRICS & GYNECOLOGY

## 2017-06-13 PROCEDURE — 86850 RBC ANTIBODY SCREEN: CPT | Performed by: OBSTETRICS & GYNECOLOGY

## 2017-06-13 PROCEDURE — 86900 BLOOD TYPING SEROLOGIC ABO: CPT | Performed by: OBSTETRICS & GYNECOLOGY

## 2017-06-13 PROCEDURE — 37000008 ZZH ANESTHESIA TECHNICAL FEE, 1ST 30 MIN: Performed by: OBSTETRICS & GYNECOLOGY

## 2017-06-13 PROCEDURE — 99233 SBSQ HOSP IP/OBS HIGH 50: CPT | Performed by: NURSE PRACTITIONER

## 2017-06-13 PROCEDURE — 76819 FETAL BIOPHYS PROFIL W/O NST: CPT

## 2017-06-13 PROCEDURE — 40000170 ZZH STATISTIC PRE-PROCEDURE ASSESSMENT II: Performed by: OBSTETRICS & GYNECOLOGY

## 2017-06-13 PROCEDURE — 25000125 ZZHC RX 250: Performed by: STUDENT IN AN ORGANIZED HEALTH CARE EDUCATION/TRAINING PROGRAM

## 2017-06-13 PROCEDURE — 84460 ALANINE AMINO (ALT) (SGPT): CPT | Performed by: OBSTETRICS & GYNECOLOGY

## 2017-06-13 PROCEDURE — 25000125 ZZHC RX 250: Performed by: ANESTHESIOLOGY

## 2017-06-13 PROCEDURE — 37000009 ZZH ANESTHESIA TECHNICAL FEE, EACH ADDTL 15 MIN: Performed by: OBSTETRICS & GYNECOLOGY

## 2017-06-13 RX ORDER — NIFEDIPINE 30 MG/1
60 TABLET, EXTENDED RELEASE ORAL DAILY
Status: DISCONTINUED | OUTPATIENT
Start: 2017-06-13 | End: 2017-06-14

## 2017-06-13 RX ORDER — MAGNESIUM SULFATE HEPTAHYDRATE 40 MG/ML
4 INJECTION, SOLUTION INTRAVENOUS ONCE
Status: COMPLETED | OUTPATIENT
Start: 2017-06-13 | End: 2017-06-13

## 2017-06-13 RX ORDER — CEFAZOLIN SODIUM 1 G/3ML
INJECTION, POWDER, FOR SOLUTION INTRAMUSCULAR; INTRAVENOUS PRN
Status: DISCONTINUED | OUTPATIENT
Start: 2017-06-13 | End: 2017-06-14

## 2017-06-13 RX ORDER — CEFAZOLIN SODIUM 1 G/3ML
1 INJECTION, POWDER, FOR SOLUTION INTRAMUSCULAR; INTRAVENOUS
Status: DISCONTINUED | OUTPATIENT
Start: 2017-06-13 | End: 2017-06-13 | Stop reason: HOSPADM

## 2017-06-13 RX ORDER — SODIUM CHLORIDE, SODIUM LACTATE, POTASSIUM CHLORIDE, CALCIUM CHLORIDE 600; 310; 30; 20 MG/100ML; MG/100ML; MG/100ML; MG/100ML
INJECTION, SOLUTION INTRAVENOUS CONTINUOUS
Status: DISCONTINUED | OUTPATIENT
Start: 2017-06-13 | End: 2017-06-13 | Stop reason: HOSPADM

## 2017-06-13 RX ORDER — CITRIC ACID/SODIUM CITRATE 334-500MG
SOLUTION, ORAL ORAL
Status: DISCONTINUED
Start: 2017-06-13 | End: 2017-06-13 | Stop reason: HOSPADM

## 2017-06-13 RX ORDER — CITRIC ACID/SODIUM CITRATE 334-500MG
30 SOLUTION, ORAL ORAL
Status: DISCONTINUED | OUTPATIENT
Start: 2017-06-13 | End: 2017-06-13 | Stop reason: HOSPADM

## 2017-06-13 RX ORDER — CEFAZOLIN SODIUM 2 G/100ML
2 INJECTION, SOLUTION INTRAVENOUS
Status: DISCONTINUED | OUTPATIENT
Start: 2017-06-13 | End: 2017-06-13 | Stop reason: HOSPADM

## 2017-06-13 RX ADMIN — ACETAMINOPHEN 650 MG: 325 TABLET, FILM COATED ORAL at 06:15

## 2017-06-13 RX ADMIN — HYDRALAZINE HYDROCHLORIDE 5 MG: 20 INJECTION INTRAMUSCULAR; INTRAVENOUS at 21:30

## 2017-06-13 RX ADMIN — NIFEDIPINE 60 MG: 30 TABLET, FILM COATED, EXTENDED RELEASE ORAL at 08:24

## 2017-06-13 RX ADMIN — BUPIVACAINE HYDROCHLORIDE IN DEXTROSE 1.6 ML: 7.5 INJECTION, SOLUTION SUBARACHNOID at 23:51

## 2017-06-13 RX ADMIN — HYDRALAZINE HYDROCHLORIDE 10 MG: 20 INJECTION INTRAMUSCULAR; INTRAVENOUS at 21:51

## 2017-06-13 RX ADMIN — SODIUM CITRATE AND CITRIC ACID MONOHYDRATE 30 ML: 500; 334 SOLUTION ORAL at 23:27

## 2017-06-13 RX ADMIN — LABETALOL HYDROCHLORIDE 40 MG: 5 INJECTION, SOLUTION INTRAVENOUS at 22:46

## 2017-06-13 RX ADMIN — MAGNESIUM SULFATE IN WATER 4 G: 40 INJECTION, SOLUTION INTRAVENOUS at 23:08

## 2017-06-13 RX ADMIN — ACETAMINOPHEN 650 MG: 325 TABLET, FILM COATED ORAL at 13:13

## 2017-06-13 RX ADMIN — SODIUM CHLORIDE, POTASSIUM CHLORIDE, SODIUM LACTATE AND CALCIUM CHLORIDE: 600; 310; 30; 20 INJECTION, SOLUTION INTRAVENOUS at 23:12

## 2017-06-13 RX ADMIN — ACETAMINOPHEN 650 MG: 325 TABLET, FILM COATED ORAL at 17:06

## 2017-06-13 RX ADMIN — LABETALOL HYDROCHLORIDE 20 MG: 5 INJECTION, SOLUTION INTRAVENOUS at 22:15

## 2017-06-13 RX ADMIN — PHENYLEPHRINE HYDROCHLORIDE 0.5 MCG/KG/MIN: 10 INJECTION, SOLUTION INTRAMUSCULAR; INTRAVENOUS; SUBCUTANEOUS at 23:50

## 2017-06-13 RX ADMIN — ACETAMINOPHEN 650 MG: 325 TABLET, FILM COATED ORAL at 02:19

## 2017-06-13 RX ADMIN — FENTANYL CITRATE 15 MCG: 50 INJECTION, SOLUTION INTRAMUSCULAR; INTRAVENOUS at 23:51

## 2017-06-13 RX ADMIN — SODIUM CHLORIDE, POTASSIUM CHLORIDE, SODIUM LACTATE AND CALCIUM CHLORIDE: 600; 310; 30; 20 INJECTION, SOLUTION INTRAVENOUS at 23:31

## 2017-06-13 RX ADMIN — CEFAZOLIN 2 G: 1 INJECTION, POWDER, FOR SOLUTION INTRAMUSCULAR; INTRAVENOUS at 23:57

## 2017-06-13 NOTE — PLAN OF CARE
Problem: Goal Outcome Summary  Goal: Goal Outcome Summary  PT-4BOB: late entry due to EPIC downtime (patient seen 12:05 pm to 1:00 pm). PT eval completed and treatment initiated. Will continue PT intervention to assist with cervical pain management and instruct in home exercise program. Anticipate discharge to home when medically appropriate.

## 2017-06-13 NOTE — PROGRESS NOTES
Maternal Fetal Medicine Antepartum Progress Note       Subjective: Patient reports that she feels much better this morning.  She states she feels like a different person.  Her headache is completely resolved.  She continues to have a left sided neck ache, but it is much improved.  She is interested in trying acupuncture for it.  She denies any visual change, right upper quadrant pain, chest pain, chest pressure, shortness of breath, nausea or vomiting.  She denies any contractions, vaginal bleeding, or loss of fluid. Reports normal fetal movements.     Objective:   Patient Vitals for the past 24 hrs:   BP Temp Temp src Heart Rate Resp SpO2 Weight   06/13/17 0825 139/76 97.8  F (36.6  C) Oral - - - -   06/13/17 0648 - - - - - - 109.5 kg (241 lb 8 oz)   06/13/17 0615 145/90 - - - - - -   06/13/17 0220 143/70 - - - - - -   06/13/17 0210 (!) 157/96 - - - - - -   06/13/17 0050 147/86 - - - - - -   06/13/17 0035 146/79 - - - - 99 % -   06/13/17 0020 148/71 - - - - 97 % -   06/13/17 0000 141/74 98.8  F (37.1  C) Oral - 18 98 % -   06/12/17 2355 144/80 - - - - 98 % -   06/12/17 2350 (!) 162/101 - - - - 97 % -   06/12/17 2345 158/86 - - - - 98 % -   06/12/17 2340 (!) 161/95 - - - - 99 % -   06/12/17 2335 (!) 151/101 - - - - - -   06/12/17 2331 149/85 - - - - - -   06/12/17 2326 142/83 - - - - - -   06/12/17 2321 150/82 - - - - - -   06/12/17 2316 146/77 - - - - - -   06/12/17 2311 149/79 - - - - - -   06/12/17 2305 161/88 - - - - - -   06/12/17 2253 165/84 - - - - - -   06/12/17 1935 155/80 - - - 20 - -   06/12/17 1618 147/78 98.7  F (37.1  C) Oral 64 20 - -   06/12/17 1504 145/77 - - - - - -   06/12/17 1500 154/82 - - - - - -   06/12/17 1449 168/86 - - - - - -   06/12/17 1434 156/86 - - - - - -   06/12/17 1419 158/85 - - - - - -   06/12/17 1400 148/78 - - - - - -   06/12/17 1356 163/80 - - - - - -   06/12/17 1351 161/81 - - - - - -   06/12/17 1349 159/80 - - - - - -   06/12/17 1340 175/79 - - - - - -   06/12/17 1330 169/90  "- - - - - -   17 1320 163/85 - - - - - -   17 1309 181/88 - - - - - -     Gen: Well-appearing, NAD, lying in bed comfortably   CV: Regular rate and rhythm, no murmurs, rubs, or gallops.    Resp: Non-labored breathing.  Appropriate inspiratory effort. Lung sounds are clear to auscultation bilaterally.   Abdomen: Gravid, Soft, Non-tender to palpation, non-distended  LE: 1+ pedal edema bilaterally     Fetal Heart Rate Tracin17 [id number]  Baseline 160  Variability: Moderate   Accelerations: 10x10 accelerations present  Decelerations: few intermittent variable decelerations    Tocometer: 0 contractions/10 minutes    Hemoglobin   Date Value Ref Range Status   2017 13.2 11.7 - 15.7 g/dL Final   2017 13.6 11.7 - 15.7 g/dL Final   2017 12.7 11.7 - 15.7 g/dL Final   06/10/2017 14.0 11.7 - 15.7 g/dL Final     Platelet Count   Date Value Ref Range Status   2017 218 150 - 450 10e9/L Final   2017 211 150 - 450 10e9/L Final   2017 220 150 - 450 10e9/L Final   06/10/2017 274 150 - 450 10e9/L Final     AST   Date Value Ref Range Status   2017 17 0 - 45 U/L Final   2017 15 0 - 45 U/L Final   2017 16 0 - 45 U/L Final   06/10/2017 16 0 - 45 U/L Final     ALT   Date Value Ref Range Status   2017 22 0 - 50 U/L Final   2017 20 0 - 50 U/L Final   2017 16 0 - 50 U/L Final   06/10/2017 20 0 - 50 U/L Final     Creatinine   Date Value Ref Range Status   2017 0.66 0.52 - 1.04 mg/dL Final   2017 0.76 0.52 - 1.04 mg/dL Final   2017 0.66 0.52 - 1.04 mg/dL Final   06/10/2017 0.77 0.52 - 1.04 mg/dL Final       Imaging: Please see \"Imaging\" tab under \"Chart Review\" for details of today's US.    Assessment/Plan:   Nhi Alexander is a 32 year old  at 24w5d by IVF dating who is HD#6 s/p admission for preeclampsia with severe features (BP and 24-hour urine protein) fetal growth restriction (AC 2%tile) with persistently reversed end " diastolic dopplers (now improved to intermittently absent end diastolic flow).    Pre-eclampsia with severe features:   -Severe headache overnight.  Treated with oxycodone with minimal improvement.  Then IV benadryl and IV reglan.  Headache now resolved and neck pain improved.  - Over past 24 hours blood pressure sustained severe range x3 needing treatment.                            -s/p 10mg PO nifedipine IR on 6/12 at 0858.  s/p hydralazine 5mg at 1335 and 5mg hydralazine at 2306   -Procardia XL 60 mg started on 6/13.    -Now overall improvement in blood pressure with most recent normal range  - S/p magnesium for seizure prophylaxis  - HELLP labs wnl last on 6/13/17    Fetal growth restriction, with abnormal umbilical artery doppler:  - FHT appropriate for gestational age  - S/p #1/2 BMZ at 1443 on 6/8, #2/2 at 1453 on 6/9  - S/p magnesium for neuro-protection, will repeat if delivery anticipated < 32 weeks.  - NICU consultation to be completed 6/13/17 per NNP   - US on 6/8: EFW 15%ile at 469 gms, AC <2%ile, repeat growth ultrasound in 3 weeks (approx. 6/29/17)  - Continue with daily BPP/dopplers   - New onset of fetal growth restriction and URI 3 weeks ago- CMV negative, Toxo pending   - C/S consent signed on 6/8     Musculoskeletal neck pain:  - s/p PT consult on 6/12/17  - Order placed for alternative medicine- Acupuncture and massage    PNC:   - AMA; normal Verifi  - Rh positive, RI  - Will need GCT 2 weeks after betamethasone window (last betamethasone 6/9)  - GBS pending   - Tdap given 6/9    Apple Turcios MD  OBGYN PGY2      Physician Attestation   I, Emanuel Bran, saw this patient with the resident and agree with the resident s findings and plan of care as documented in the resident s note.      I personally reviewed vital signs, examination and FHR tracing.    Key findings: BP now < 150/100, NT upper quadrant, No HA, category 1 tracing. Plan delivery for uncontrolled BP, recurrent HA,  JEANNE Bran  Date of Service (when I saw the patient): 06/13/17

## 2017-06-13 NOTE — PROGRESS NOTES
[facility]                          Neonatology Advanced Practice Antepartum Counseling Consult      I was asked to provide antepartum counseling for Nhi Alexander at the request of Emanuel Bran MD secondary to  labor, hypertension with SF.  Ms. Alexander is currently 24.5 weeks and has a hx significant for hypertension. Betamethasone was administered on 17. Ms. Alexander, accompanied by her , was counseled on the expected hospital course, potential risks, and outcomes associated with an infant born at approximately 24/25 weeks gestation. The counseling included: morbidity, initial delivery room stabilization, risks associated with prematurity were broadly discussed in regards to respiratory, neuro, GI, cardiac, infection, skin, nutrition, growth and development, and long term outcomes. Please feel free to call with any additional questions or concerns.        Floor Time (min): 5  Face to Face Time (min): 30  Total Time (minutes): 35  More than 50% of my time was spent in direct, face to face, antepartum counseling with the above patient.      Jen Gamboa RN, NNP - Intern   Advanced Practice Service    Intensive Care Unit  [facility]    Spring Keenan, APRN, CNP  2017 10:29 AM

## 2017-06-13 NOTE — PROGRESS NOTES
Late entry due to pt care    I have been to evaluate the pt three times since . Reports ongoing severe 10/10 bilateral frontal HA. Some N/V. No vision changes, arm or facial weakness. No abdominal pain, ctx, LOF, VB. Good FM. Really wants something to relieve the headache.       Vitals:    17 2335 17 2340 17 2345 17 2350   BP: (!) 151/101 (!) 161/95 158/86 (!) 162/101   Pulse:       Resp:       Temp:       TempSrc:       SpO2:  99% 98% 97%   Weight:       Height:       F/u 's/90s  Gen: NAD, a/o  Neuro: 1+ DTR's, CN II-XII grossly intact, strength 5/5 all extremities  CV: RRR, no m/r/g  Pulm: CTAB  Abd: NT, gravid  Ext: 2+ edema, 1+ DTR's, no beats clonus    Labs ordered: HELLP labs    FHR: 150  Mod jose  Accels absent  Decels present - intermittent variables  [place] flat      A/P:   Nhi Alexander is a 32 year old  at 24w5d by IVF dating, now HD#6 this admission for pre-eclampsia with SF, based on BP and 24h urine protein, asymettric IUGR with AC 2%ile, and persistently reversed end diastolic dopplers, now improved to intermittently absent end diastolic flow.    Now presenting with severe headache, not responding to medications. We have given Fioricet x1, roxicodone x1, and now giving IV reglan and IV benadryl. Pt reports some relief, but will continue to monitor closely. Also presenting with sustained severe range blood pressures, administered IV hydralazine x1 with improvement, no ongoing severe sustained BP's. Clinical exam is overall reassuring without neurologic changes on exam, but headache likely represents an acute worsening in severe features.     If ongoing severe headache, will likely start magnesium and delivery via  section given acutely worsening severe features and very early  gestation.    The patient was discussed with Dr. Bran who is in agreement with the treatment plan.    Rosie Purdy MD  OB GYN PGY-3  2017        Addendum:  Pt was re  evaluated at 0100 and is now sleeping. If awakens with significant headache at any point, will recommend magnesium and delivery, per Dr. Bran. HELLP labs returned normal. Dr. Costello and NICU alerted of plan.     Rosie Purdy MD  OB GYN PGY-3

## 2017-06-13 NOTE — PLAN OF CARE
Problem: Goal Outcome Summary  Goal: Goal Outcome Summary  PT: attempted therapy this morning. Patient declined and stated she wanted to sleep. Will return later today and/or reschedule for Friday.

## 2017-06-13 NOTE — PLAN OF CARE
No headache relief with oral oxycodone, resident at bedside and orders to give iv reglan and iv diphenhydramine.  Orders to update resident with results of severe range blood pressures before giving any more hydralazine.

## 2017-06-13 NOTE — PLAN OF CARE
Headache too intense and patient unable to try reglan and benadryl combo to see if they might work, opted to try oral oxycodone instead. Administered at 2243.  Will continue to monitor.

## 2017-06-13 NOTE — PLAN OF CARE
Data: Blood pressures outside parameters iv hydralazine 5 mg administered with adequate decrease in blood pressures. Signs and symptoms of pre-eclampsia headache, elevated blood pressures and edema. Fetal assessment Reactive.  Interventions: Monitor blood pressures and indicators of pre-eclampsia every 4 hours. Continue uterine/fetal assessment TID and PRN. Activity level Bed rest with bathroom privileges. Preventive measures include Medications. Encourage active range of motion and frequent position changes.  Plan: Continue expectant management. Observe for and notify care provider of indicators of worsening pre-eclampsia or signs of fetal/maternal compromise.

## 2017-06-13 NOTE — PROGRESS NOTES
06/12/17 1205   Quick Adds   Type of Visit Initial PT Evaluation   Living Environment   Lives With spouse   Living Arrangements house   Home Accessibility stairs to enter home;stairs within home   Number of Stairs to Enter Home 2   Number of Stairs Within Home 8  (split entry; 8 up & 8 down)   Stair Railings at Home inside, present on right side   Transportation Available car;family or friend will provide   Self-Care   Dominant Hand right   Usual Activity Tolerance good   Current Activity Tolerance fair   Regular Exercise no   Equipment Currently Used at Home none   Functional Level Prior   Ambulation 0-->independent   Transferring 0-->independent   Toileting 0-->independent   Bathing 0-->independent   Dressing 0-->independent   Eating 0-->independent   Communication 0-->understands/communicates without difficulty   Swallowing 0-->swallows foods/liquids without difficulty   Cognition 0 - no cognition issues reported   Fall history within last six months no   Which of the above functional risks had a recent onset or change? none   General Information   Onset of Illness/Injury or Date of Surgery - Date 06/07/17   Referring Physician Cici Rosas MD   Patient/Family Goals Statement Resolve cervical pain   Pertinent History of Current Problem (include personal factors and/or comorbidities that impact the POC) Patient admitted to hospital due to preeclampsia with severe features. Reports onset of cervical pain on 6/7/17, stating that she believes that she slept wrong that night. Initial discomfort was left scapular & left cervical extensors; the scapular discomfort has resolved, but cervical pain is now suboccipital, left > right with minimal relief   General Observations Patient in semireclined position in bed; her mother is present during PT session   Cognitive Status Examination   Orientation orientation to person, place and time   Level of Consciousness alert   Follows Commands and Answers Questions 100% of the  "time   Personal Safety and Judgment intact   Memory intact   Pain Assessment   Patient Currently in Pain Yes, see Vital Sign flowsheet   Range of Motion (ROM)   ROM Comment cervical flexion- full; cervical rotation - right approx 75% of range, left approx 25% of range; lateral flexion - right approx 25% of range; left - full   Bed Mobility   Bed Mobility Comments independent   Transfer Skills   Transfer Comments independent   Gait   Gait Comments independent   General Therapy Interventions   Planned Therapy Interventions manual therapy;ROM;stretching;home program guidelines   Clinical Impression   Criteria for Skilled Therapeutic Intervention yes, treatment indicated   PT Diagnosis cervical pain    Influenced by the following impairments pain, decreased ROM, decreased strength   Functional limitations due to impairments inability to sleep   Clinical Presentation Stable/Uncomplicated   Clinical Presentation Rationale subjective information provided; objective exam findings   Clinical Decision Making (Complexity) Low complexity   Therapy Frequency` daily  (for 3-4 days with progression to every 2-3 days)   Predicted Duration of Therapy Intervention (days/wks) 2-3 weeks   Anticipated Discharge Disposition Home with Outpatient Therapy   Risk & Benefits of therapy have been explained Yes   Patient, Family & other staff in agreement with plan of care Yes   Clinical Impression Comments Presents with cervical/suboccipital pain; left > right, 9/10 at worst, 6/10 at best. Interfering with her ability to sleep; complicated by need for rest due to preeclampsia    [facility] [employer] TM \"6 Clicks\"   2016, Trustees of [facility], under license to [employer].  All rights reserved.   6 Clicks Short Forms Basic Mobility Inpatient Short Form   [facility] [employer]  \"6 Clicks\" V.2 Basic Mobility Inpatient Short Form   1. Turning from your back to your side while in a flat bed without using bedrails? 4 - None   2. " Moving from lying on your back to sitting on the side of a flat bed without using bedrails? 4 - None   3. Moving to and from a bed to a chair (including a wheelchair)? 4 - None   4. Standing up from a chair using your arms (e.g., wheelchair, or bedside chair)? 4 - None   5. To walk in hospital room? 4 - None   6. Climbing 3-5 steps with a railing? 4 - None   Basic Mobility Raw Score (Score out of 24.Lower scores equate to lower levels of function) 24   Total Evaluation Time   Total Evaluation Time (Minutes) 10

## 2017-06-13 NOTE — PROVIDER NOTIFICATION
Patient has not had any relief with Fioricet, aqua K pad, or Ambien.  Headache is still severe, was only better after hot shower for short time. Patient reluctant to try oxycodone as it is a narcotic.  Has tried reglan in the past with minimal relief. Requesting the resident to assess patient.

## 2017-06-13 NOTE — PLAN OF CARE
Problem: Hypertensive Disorders in Pregnancy (Adult,Obstetrics,Pediatric)  Goal: Signs and Symptoms of Listed Potential Problems Will be Absent or Manageable (Hypertensive Disorders in Pregnancy)  Signs and symptoms of listed potential problems will be absent or manageable by discharge/transition of care (reference Hypertensive Disorders in Pregnancy (Adult,Obstetrics,Pediatric) CPG).   Outcome: Improving  Data: Blood pressures returned to within parameters after hydralazine.. Signs and symptoms of pre-eclampsia: HA reported, but relieved by tylenol. Fetal assessment Appropriate for Gestational Age.  Interventions: Monitor blood pressures and indicators of pre-eclampsia every 4 hours. Continue uterine/fetal assessment TID. Activity level Bed rest with bathroom privileges. Preventive measures include Frequent voiding. Encourage active range of motion and frequent position changes.  Plan: Continue expectant management. Observe for and notify care provider of indicators of worsening pre-eclampsia or signs of fetal/maternal compromise. Begin NPO in case of need to deliver precipitously.

## 2017-06-13 NOTE — PLAN OF CARE
Problem: Goal Outcome Summary  Goal: Goal Outcome Summary  Outcome: No Change  Patient stated she felt the best she has felt in a long time this morning. Patient stated her neck was sore, but denied HA. BPs were stable. Patient able to rest through the later morning and early afternoon. Denies any vision changes, epigastric pain, contractions. Given Tylenol in the afternoon for neck soreness.

## 2017-06-14 PROBLEM — Z98.891 S/P CESAREAN SECTION: Status: ACTIVE | Noted: 2017-06-14

## 2017-06-14 LAB
ALT SERPL W P-5'-P-CCNC: 20 U/L (ref 0–50)
AST SERPL W P-5'-P-CCNC: 16 U/L (ref 0–45)
CREAT SERPL-MCNC: 0.76 MG/DL (ref 0.52–1.04)
ERYTHROCYTE [DISTWIDTH] IN BLOOD BY AUTOMATED COUNT: 13.3 % (ref 10–15)
GFR SERPL CREATININE-BSD FRML MDRD: 87 ML/MIN/1.7M2
HCT VFR BLD AUTO: 38.6 % (ref 35–47)
HGB BLD-MCNC: 12.9 G/DL (ref 11.7–15.7)
MCH RBC QN AUTO: 30.8 PG (ref 26.5–33)
MCHC RBC AUTO-ENTMCNC: 33.4 G/DL (ref 31.5–36.5)
MCV RBC AUTO: 92 FL (ref 78–100)
PLATELET # BLD AUTO: 235 10E9/L (ref 150–450)
RBC # BLD AUTO: 4.19 10E12/L (ref 3.8–5.2)
WBC # BLD AUTO: 24.3 10E9/L (ref 4–11)

## 2017-06-14 PROCEDURE — 36415 COLL VENOUS BLD VENIPUNCTURE: CPT | Performed by: OBSTETRICS & GYNECOLOGY

## 2017-06-14 PROCEDURE — 82565 ASSAY OF CREATININE: CPT | Performed by: OBSTETRICS & GYNECOLOGY

## 2017-06-14 PROCEDURE — 25000132 ZZH RX MED GY IP 250 OP 250 PS 637: Performed by: OBSTETRICS & GYNECOLOGY

## 2017-06-14 PROCEDURE — 88305 TISSUE EXAM BY PATHOLOGIST: CPT | Performed by: OBSTETRICS & GYNECOLOGY

## 2017-06-14 PROCEDURE — C9290 INJ, BUPIVACAINE LIPOSOME: HCPCS

## 2017-06-14 PROCEDURE — 88305 TISSUE EXAM BY PATHOLOGIST: CPT | Mod: 26 | Performed by: OBSTETRICS & GYNECOLOGY

## 2017-06-14 PROCEDURE — 12000032 ZZH R&B OB CRITICAL UMMC

## 2017-06-14 PROCEDURE — 25000125 ZZHC RX 250: Performed by: ANESTHESIOLOGY

## 2017-06-14 PROCEDURE — 84460 ALANINE AMINO (ALT) (SGPT): CPT | Performed by: OBSTETRICS & GYNECOLOGY

## 2017-06-14 PROCEDURE — 25000125 ZZHC RX 250

## 2017-06-14 PROCEDURE — 85027 COMPLETE CBC AUTOMATED: CPT | Performed by: OBSTETRICS & GYNECOLOGY

## 2017-06-14 PROCEDURE — 25000125 ZZHC RX 250: Performed by: OBSTETRICS & GYNECOLOGY

## 2017-06-14 PROCEDURE — 84450 TRANSFERASE (AST) (SGOT): CPT | Performed by: OBSTETRICS & GYNECOLOGY

## 2017-06-14 PROCEDURE — 25000128 H RX IP 250 OP 636

## 2017-06-14 PROCEDURE — 25000128 H RX IP 250 OP 636: Performed by: OBSTETRICS & GYNECOLOGY

## 2017-06-14 RX ORDER — ACETAMINOPHEN 325 MG/1
650 TABLET ORAL EVERY 4 HOURS PRN
Status: DISCONTINUED | OUTPATIENT
Start: 2017-06-17 | End: 2017-06-14

## 2017-06-14 RX ORDER — MAGNESIUM SULFATE IN WATER 40 MG/ML
INJECTION, SOLUTION INTRAVENOUS
Status: DISCONTINUED
Start: 2017-06-14 | End: 2017-06-14 | Stop reason: HOSPADM

## 2017-06-14 RX ORDER — LANOLIN 100 %
OINTMENT (GRAM) TOPICAL
Status: DISCONTINUED | OUTPATIENT
Start: 2017-06-14 | End: 2017-06-18 | Stop reason: HOSPADM

## 2017-06-14 RX ORDER — OXYTOCIN/0.9 % SODIUM CHLORIDE 30/500 ML
100 PLASTIC BAG, INJECTION (ML) INTRAVENOUS CONTINUOUS
Status: DISCONTINUED | OUTPATIENT
Start: 2017-06-14 | End: 2017-06-18 | Stop reason: HOSPADM

## 2017-06-14 RX ORDER — OXYTOCIN 10 [USP'U]/ML
10 INJECTION, SOLUTION INTRAMUSCULAR; INTRAVENOUS
Status: DISCONTINUED | OUTPATIENT
Start: 2017-06-14 | End: 2017-06-18 | Stop reason: HOSPADM

## 2017-06-14 RX ORDER — MEPERIDINE HYDROCHLORIDE 25 MG/ML
12.5 INJECTION INTRAMUSCULAR; INTRAVENOUS; SUBCUTANEOUS
Status: DISCONTINUED | OUTPATIENT
Start: 2017-06-14 | End: 2017-06-14

## 2017-06-14 RX ORDER — OXYCODONE HYDROCHLORIDE 5 MG/1
5-10 TABLET ORAL
Status: DISCONTINUED | OUTPATIENT
Start: 2017-06-14 | End: 2017-06-14

## 2017-06-14 RX ORDER — CARBOPROST TROMETHAMINE 250 UG/ML
250 INJECTION, SOLUTION INTRAMUSCULAR
Status: DISCONTINUED | OUTPATIENT
Start: 2017-06-14 | End: 2017-06-18 | Stop reason: HOSPADM

## 2017-06-14 RX ORDER — OXYCODONE HYDROCHLORIDE 5 MG/1
10-15 TABLET ORAL
Status: DISCONTINUED | OUTPATIENT
Start: 2017-06-14 | End: 2017-06-15

## 2017-06-14 RX ORDER — BUPIVACAINE HYDROCHLORIDE 7.5 MG/ML
INJECTION, SOLUTION INTRASPINAL PRN
Status: DISCONTINUED | OUTPATIENT
Start: 2017-06-13 | End: 2017-06-14

## 2017-06-14 RX ORDER — BUPIVACAINE HYDROCHLORIDE AND EPINEPHRINE 2.5; 5 MG/ML; UG/ML
INJECTION, SOLUTION INFILTRATION; PERINEURAL PRN
Status: DISCONTINUED | OUTPATIENT
Start: 2017-06-14 | End: 2017-06-14

## 2017-06-14 RX ORDER — FENTANYL CITRATE 50 UG/ML
25-50 INJECTION, SOLUTION INTRAMUSCULAR; INTRAVENOUS
Status: DISCONTINUED | OUTPATIENT
Start: 2017-06-14 | End: 2017-06-14 | Stop reason: HOSPADM

## 2017-06-14 RX ORDER — HYDROCORTISONE 2.5 %
CREAM (GRAM) TOPICAL 3 TIMES DAILY PRN
Status: DISCONTINUED | OUTPATIENT
Start: 2017-06-14 | End: 2017-06-18 | Stop reason: HOSPADM

## 2017-06-14 RX ORDER — ONDANSETRON 2 MG/ML
4 INJECTION INTRAMUSCULAR; INTRAVENOUS EVERY 6 HOURS PRN
Status: DISCONTINUED | OUTPATIENT
Start: 2017-06-14 | End: 2017-06-18 | Stop reason: HOSPADM

## 2017-06-14 RX ORDER — BISACODYL 10 MG
10 SUPPOSITORY, RECTAL RECTAL DAILY PRN
Status: DISCONTINUED | OUTPATIENT
Start: 2017-06-16 | End: 2017-06-18 | Stop reason: HOSPADM

## 2017-06-14 RX ORDER — LIDOCAINE 40 MG/G
CREAM TOPICAL
Status: DISCONTINUED | OUTPATIENT
Start: 2017-06-14 | End: 2017-06-18 | Stop reason: HOSPADM

## 2017-06-14 RX ORDER — AMOXICILLIN 250 MG
1-2 CAPSULE ORAL 2 TIMES DAILY
Status: DISCONTINUED | OUTPATIENT
Start: 2017-06-14 | End: 2017-06-18 | Stop reason: HOSPADM

## 2017-06-14 RX ORDER — SIMETHICONE 80 MG
80 TABLET,CHEWABLE ORAL 4 TIMES DAILY PRN
Status: DISCONTINUED | OUTPATIENT
Start: 2017-06-14 | End: 2017-06-18 | Stop reason: HOSPADM

## 2017-06-14 RX ORDER — DIPHENHYDRAMINE HCL 25 MG
25 CAPSULE ORAL EVERY 6 HOURS PRN
Status: DISCONTINUED | OUTPATIENT
Start: 2017-06-14 | End: 2017-06-18 | Stop reason: HOSPADM

## 2017-06-14 RX ORDER — NALOXONE HYDROCHLORIDE 0.4 MG/ML
.1-.4 INJECTION, SOLUTION INTRAMUSCULAR; INTRAVENOUS; SUBCUTANEOUS
Status: DISCONTINUED | OUTPATIENT
Start: 2017-06-14 | End: 2017-06-14

## 2017-06-14 RX ORDER — SODIUM CHLORIDE, SODIUM LACTATE, POTASSIUM CHLORIDE, CALCIUM CHLORIDE 600; 310; 30; 20 MG/100ML; MG/100ML; MG/100ML; MG/100ML
INJECTION, SOLUTION INTRAVENOUS CONTINUOUS PRN
Status: DISCONTINUED | OUTPATIENT
Start: 2017-06-13 | End: 2017-06-14

## 2017-06-14 RX ORDER — SODIUM CHLORIDE, SODIUM LACTATE, POTASSIUM CHLORIDE, CALCIUM CHLORIDE 600; 310; 30; 20 MG/100ML; MG/100ML; MG/100ML; MG/100ML
INJECTION, SOLUTION INTRAVENOUS CONTINUOUS
Status: DISCONTINUED | OUTPATIENT
Start: 2017-06-14 | End: 2017-06-18 | Stop reason: HOSPADM

## 2017-06-14 RX ORDER — NALOXONE HYDROCHLORIDE 0.4 MG/ML
.1-.4 INJECTION, SOLUTION INTRAMUSCULAR; INTRAVENOUS; SUBCUTANEOUS
Status: DISCONTINUED | OUTPATIENT
Start: 2017-06-14 | End: 2017-06-18 | Stop reason: HOSPADM

## 2017-06-14 RX ORDER — MAGNESIUM SULFATE IN WATER 40 MG/ML
2 INJECTION, SOLUTION INTRAVENOUS CONTINUOUS
Status: DISCONTINUED | OUTPATIENT
Start: 2017-06-14 | End: 2017-06-15

## 2017-06-14 RX ORDER — ONDANSETRON 2 MG/ML
4 INJECTION INTRAMUSCULAR; INTRAVENOUS EVERY 30 MIN PRN
Status: DISCONTINUED | OUTPATIENT
Start: 2017-06-14 | End: 2017-06-14

## 2017-06-14 RX ORDER — MAGNESIUM SULFATE HEPTAHYDRATE 40 MG/ML
4 INJECTION, SOLUTION INTRAVENOUS
Status: DISCONTINUED | OUTPATIENT
Start: 2017-06-14 | End: 2017-06-18 | Stop reason: HOSPADM

## 2017-06-14 RX ORDER — MISOPROSTOL 200 UG/1
800 TABLET ORAL
Status: DISCONTINUED | OUTPATIENT
Start: 2017-06-14 | End: 2017-06-18 | Stop reason: HOSPADM

## 2017-06-14 RX ORDER — DIPHENHYDRAMINE HYDROCHLORIDE 50 MG/ML
25 INJECTION INTRAMUSCULAR; INTRAVENOUS EVERY 6 HOURS PRN
Status: DISCONTINUED | OUTPATIENT
Start: 2017-06-14 | End: 2017-06-18 | Stop reason: HOSPADM

## 2017-06-14 RX ORDER — SODIUM CHLORIDE, SODIUM LACTATE, POTASSIUM CHLORIDE, CALCIUM CHLORIDE 600; 310; 30; 20 MG/100ML; MG/100ML; MG/100ML; MG/100ML
INJECTION, SOLUTION INTRAVENOUS CONTINUOUS
Status: DISCONTINUED | OUTPATIENT
Start: 2017-06-14 | End: 2017-06-14

## 2017-06-14 RX ORDER — OXYTOCIN/0.9 % SODIUM CHLORIDE 30/500 ML
340 PLASTIC BAG, INJECTION (ML) INTRAVENOUS CONTINUOUS PRN
Status: DISCONTINUED | OUTPATIENT
Start: 2017-06-14 | End: 2017-06-18 | Stop reason: HOSPADM

## 2017-06-14 RX ORDER — ACETAMINOPHEN 325 MG/1
975 TABLET ORAL EVERY 8 HOURS
Status: DISCONTINUED | OUTPATIENT
Start: 2017-06-14 | End: 2017-06-14

## 2017-06-14 RX ORDER — FENTANYL CITRATE 50 UG/ML
INJECTION, SOLUTION INTRAMUSCULAR; INTRAVENOUS PRN
Status: DISCONTINUED | OUTPATIENT
Start: 2017-06-13 | End: 2017-06-14

## 2017-06-14 RX ORDER — ONDANSETRON 4 MG/1
4 TABLET, ORALLY DISINTEGRATING ORAL EVERY 30 MIN PRN
Status: DISCONTINUED | OUTPATIENT
Start: 2017-06-14 | End: 2017-06-14

## 2017-06-14 RX ORDER — ACETAMINOPHEN 325 MG/1
975 TABLET ORAL EVERY 8 HOURS
Status: COMPLETED | OUTPATIENT
Start: 2017-06-14 | End: 2017-06-16

## 2017-06-14 RX ORDER — OXYTOCIN/0.9 % SODIUM CHLORIDE 30/500 ML
PLASTIC BAG, INJECTION (ML) INTRAVENOUS CONTINUOUS PRN
Status: DISCONTINUED | OUTPATIENT
Start: 2017-06-14 | End: 2017-06-14

## 2017-06-14 RX ORDER — CALCIUM GLUCONATE 94 MG/ML
1 INJECTION, SOLUTION INTRAVENOUS
Status: DISCONTINUED | OUTPATIENT
Start: 2017-06-14 | End: 2017-06-18 | Stop reason: HOSPADM

## 2017-06-14 RX ADMIN — FENTANYL CITRATE 85 MCG: 50 INJECTION, SOLUTION INTRAMUSCULAR; INTRAVENOUS at 01:03

## 2017-06-14 RX ADMIN — SODIUM CHLORIDE, POTASSIUM CHLORIDE, SODIUM LACTATE AND CALCIUM CHLORIDE 75 ML/HR: 600; 310; 30; 20 INJECTION, SOLUTION INTRAVENOUS at 22:27

## 2017-06-14 RX ADMIN — SIMETHICONE CHEW TAB 80 MG 80 MG: 80 TABLET ORAL at 21:53

## 2017-06-14 RX ADMIN — PHENYLEPHRINE HYDROCHLORIDE 200 MCG: 10 INJECTION, SOLUTION INTRAMUSCULAR; INTRAVENOUS; SUBCUTANEOUS at 00:36

## 2017-06-14 RX ADMIN — BUPIVACAINE 20 ML: 13.3 INJECTION, SUSPENSION, LIPOSOMAL INFILTRATION at 01:38

## 2017-06-14 RX ADMIN — SIMETHICONE CHEW TAB 80 MG 80 MG: 80 TABLET ORAL at 15:12

## 2017-06-14 RX ADMIN — OXYCODONE HYDROCHLORIDE 15 MG: 5 TABLET ORAL at 12:36

## 2017-06-14 RX ADMIN — MAGNESIUM SULFATE IN WATER 2 G/HR: 40 INJECTION, SOLUTION INTRAVENOUS at 22:26

## 2017-06-14 RX ADMIN — PHENYLEPHRINE HYDROCHLORIDE 300 MCG: 10 INJECTION, SOLUTION INTRAMUSCULAR; INTRAVENOUS; SUBCUTANEOUS at 00:53

## 2017-06-14 RX ADMIN — SENNOSIDES AND DOCUSATE SODIUM 1 TABLET: 8.6; 5 TABLET ORAL at 09:24

## 2017-06-14 RX ADMIN — OXYTOCIN-SODIUM CHLORIDE 0.9% IV SOLN 30 UNIT/500ML 100 ML/HR: 30-0.9/5 SOLUTION at 03:00

## 2017-06-14 RX ADMIN — ACETAMINOPHEN 975 MG: 325 TABLET, FILM COATED ORAL at 11:18

## 2017-06-14 RX ADMIN — OXYCODONE HYDROCHLORIDE 15 MG: 5 TABLET ORAL at 21:52

## 2017-06-14 RX ADMIN — PHENYLEPHRINE HYDROCHLORIDE 200 MCG: 10 INJECTION, SOLUTION INTRAMUSCULAR; INTRAVENOUS; SUBCUTANEOUS at 00:25

## 2017-06-14 RX ADMIN — OXYCODONE HYDROCHLORIDE 10 MG: 5 TABLET ORAL at 03:47

## 2017-06-14 RX ADMIN — OXYCODONE HYDROCHLORIDE 10 MG: 5 TABLET ORAL at 09:28

## 2017-06-14 RX ADMIN — SIMETHICONE CHEW TAB 80 MG 80 MG: 80 TABLET ORAL at 11:27

## 2017-06-14 RX ADMIN — OXYTOCIN-SODIUM CHLORIDE 0.9% IV SOLN 30 UNIT/500ML 300 ML/HR: 30-0.9/5 SOLUTION at 00:21

## 2017-06-14 RX ADMIN — SENNOSIDES AND DOCUSATE SODIUM 1 TABLET: 8.6; 5 TABLET ORAL at 19:25

## 2017-06-14 RX ADMIN — OXYCODONE HYDROCHLORIDE 10 MG: 5 TABLET ORAL at 06:48

## 2017-06-14 RX ADMIN — FENTANYL CITRATE 50 MCG: 50 INJECTION, SOLUTION INTRAMUSCULAR; INTRAVENOUS at 02:20

## 2017-06-14 RX ADMIN — BUPIVACAINE HYDROCHLORIDE AND EPINEPHRINE BITARTRATE 20 ML: 2.5; .005 INJECTION, SOLUTION INFILTRATION; PERINEURAL at 01:38

## 2017-06-14 RX ADMIN — SODIUM CHLORIDE, POTASSIUM CHLORIDE, SODIUM LACTATE AND CALCIUM CHLORIDE 1000 ML: 600; 310; 30; 20 INJECTION, SOLUTION INTRAVENOUS at 09:28

## 2017-06-14 RX ADMIN — MAGNESIUM SULFATE IN WATER 2 G/HR: 40 INJECTION, SOLUTION INTRAVENOUS at 11:18

## 2017-06-14 RX ADMIN — PHENYLEPHRINE HYDROCHLORIDE 200 MCG: 10 INJECTION, SOLUTION INTRAMUSCULAR; INTRAVENOUS; SUBCUTANEOUS at 00:39

## 2017-06-14 RX ADMIN — ACETAMINOPHEN 975 MG: 325 TABLET, FILM COATED ORAL at 19:25

## 2017-06-14 RX ADMIN — OXYCODONE HYDROCHLORIDE 15 MG: 5 TABLET ORAL at 15:47

## 2017-06-14 RX ADMIN — OXYTOCIN-SODIUM CHLORIDE 0.9% IV SOLN 30 UNIT/500ML 100 ML/HR: 30-0.9/5 SOLUTION at 02:11

## 2017-06-14 RX ADMIN — MAGNESIUM SULFATE IN WATER 2 G/HR: 40 INJECTION, SOLUTION INTRAVENOUS at 02:00

## 2017-06-14 RX ADMIN — OXYCODONE HYDROCHLORIDE 5 MG: 5 TABLET ORAL at 10:29

## 2017-06-14 RX ADMIN — FENTANYL CITRATE 50 MCG: 50 INJECTION, SOLUTION INTRAMUSCULAR; INTRAVENOUS at 03:16

## 2017-06-14 RX ADMIN — ACETAMINOPHEN 975 MG: 325 TABLET, FILM COATED ORAL at 03:44

## 2017-06-14 RX ADMIN — OXYCODONE HYDROCHLORIDE 15 MG: 5 TABLET ORAL at 18:43

## 2017-06-14 RX ADMIN — FENTANYL CITRATE 50 MCG: 50 INJECTION, SOLUTION INTRAMUSCULAR; INTRAVENOUS at 02:40

## 2017-06-14 RX ADMIN — PHENYLEPHRINE HYDROCHLORIDE 200 MCG: 10 INJECTION, SOLUTION INTRAMUSCULAR; INTRAVENOUS; SUBCUTANEOUS at 00:06

## 2017-06-14 NOTE — PROVIDER NOTIFICATION
06/13/17 2130   Provider Notification   Provider Name/Title MARILYN Purdy MD   Method of Notification At Bedside   Notification Reason Maternal Vital Sign Change   Dr. Purdy notified of severe range BPs. 5mg IV Hydralazine given at 2130. BPs remained elevated. Another 10 mg hydralazine given at 2151 per Dr. Purdy. Pt denies pre-eclamptic symptoms. Reflexes and clonus stable. Will continue to monitor BPs and signs/symptoms of pre-eclampsia.

## 2017-06-14 NOTE — PROGRESS NOTES
OB Note    S: Called to room for severe range BP's. Pt reports she feels fine. No HA, changes in vision, SOB, CP. Mild neck ache. No N/V, RUQ abdominal pain. Good FM. No ctx, VB or LOF.     O:   Vitals:    17 2145 17 2150 17 2200 17 2204   BP: (!) 188/99 (!) 187/98 (!) 181/91 (!) 182/101   Pulse:       Resp:       Temp:       TempSrc:       SpO2:       Weight:       Height:         Gen: NAD, a/o  CV: RRR  Pulm: No increased WOB  Abd: Nontender, gravid  Ext: 2+ LE edema b/l, nontender; 2+ DTR's, no clonus    HELLP labs ordered.    A/P:  Nhi Alexander is a 32 year old  at 24w5d admitted for pre eclampsia with severe features, asymmetric IUGR and initially reversed end diastolic flow, now intermittent absent. Now presenting with sustained severe range blood pressures.  - IV hydralazine 5 mg administered at 0  - IV hydralazine 10 mg administered at   - IV labetalol 20 mg administered at 2215    If requires ongoing anti hypertensive medications will likely move towards delivery. Discussed with Dr. Bran. HELLP labs pending. Type and screen updated. Placed NPO. Dr. Alfredo alerted.     Rosie Purdy MD  OB GYN PGY-3

## 2017-06-14 NOTE — PROVIDER NOTIFICATION
06/14/17 0323   Provider Notification   Provider Name/Title Dr Ernst   Method of Notification At Bedside   Notification Reason Other   Evaluating for pain. Plan to start oral pain meds. Pt continues to c/o abdominal pan and pressure. Unable to express any blood from uterus. Fundus so low unable to palpate. VSS.

## 2017-06-14 NOTE — PROGRESS NOTES
Brief OB Note    O: In to evaluate pt prior to transfer upstairs. Reports pain all over her abdomen. No N/V. No dizziness, CP, SOB, HA, vision changes. No lochia expressed on fundal exams.     O:   Vitals:    06/14/17 0305 06/14/17 0310 06/14/17 0320 06/14/17 0335   BP: 113/62 128/73 131/69    Pulse:       Resp: 18 19 12    Temp:    99.5  F (37.5  C)   TempSrc:    Axillary   SpO2: 96%      Weight:       Height:         Gen: NAD, a/o  CV: RRR  Pulm: No increased WOB  Abd: Soft, subjectively tender however no guarding, no rebound, no peritoneal signs, nondistended. Incision clean with bandage in place. Fundus not palpable.  Ext: 2+ LE edema; 2+ DTR's    Intake/Output Summary (Last 24 hours) at 06/14/17 0343  Last data filed at 06/14/17 0330   Gross per 24 hour   Intake             1350 ml   Output             3100 ml   Net            -1750 ml     A/P: Nhi Alexander is a 32 year old  POD#0 s/p PCCS for pre-e with SF and worsening BP's not responding to anti hypertensive medications. Post operative pain, however reassured by normal VS and abdominal exam. Will monitor closely. Fundus is extremely low, likely below incision making fundal exams and uterine expression difficult. On magnesium with no s/s of magnesium toxicity at this time. Begin PO medications, contact MD resident with worsening pain, change in VS status or concerns with fundal exam or lochia.    Rosie Purdy MD  OB GYN PGY-3

## 2017-06-14 NOTE — ANESTHESIA PROCEDURE NOTES
Spinal/LP Procedure Note    Spinal Block  Staff:     Anesthesiologist:  SARAH DARNELL    Resident/CRNA:  LISA PAZ    Spinal/LP performed by resident/CRNA in presence of a teaching physician.    Location: OB  Procedure Start/Stop Times:      6/14/2017 11:38 PM     6/14/2017 11:51 PM    patient identified, IV checked, site marked, risks and benefits discussed, informed consent, monitors and equipment checked, pre-op evaluation, at physician/surgeon's request and post-op pain management      Correct Patient: Yes      Correct Position: Yes      Correct Site: Yes      Correct Procedure: Yes      Correct Laterality:  Yes    Site Marked:  Yes  Procedure:     Procedure:  Intrathecal    ASA:  3 and Emergent    Position:  Right lateral decubitus    Sterile Prep: chloraprep, alcohol, mask, sterile gloves and patient draped      Insertion site:  L3-4    Approach:  Midline    Needle Type:  Ismael    Needle gauge (G):  25    Local Skin Infiltration:  1% lidocaine    amount (ml):  3    Needle Length (in):  3.5    Introducer used: Yes      Introducer gauge:  20 G    CSF:  Clear    Paresthesias:  No    Time injected:  23:51  Assessment/Narrative:      2 attempts in seated position by resident, patient started to feel nauseous, was diaphoretic, bradycardic to 57bpm and blood pressure had dropped so she was put in the RLD position, phenylephrine was administered as well as O2 via NC.  Baby was also monitored at this time and was doing well.  Resident attempted 1 time in RLD position after patient stable, unsuccessful.  One attempt by staff in RLD position, clear CSF, medication injected. Patient tolerated well,  Level to T6.    Sarah Darnell MD  June 14, 2017

## 2017-06-14 NOTE — PROGRESS NOTES
OB Note    S: Called to room for ongoing severe range BP's. Pt reports she feels fine. No HA, changes in vision, SOB, CP. Mild neck ache. No N/V, RUQ abdominal pain. Good FM. No ctx, VB or LOF. Overwhelmed, appropriate questions and tears when discussing .     O:   Vitals:    17 2224 17 2229 17 2235 17 2240   BP: 168/81 168/79 164/79 168/88   Pulse:       Resp:       Temp:       TempSrc:       SpO2:       Weight:       Height:         Gen: NAD, a/o  CV: RRR  Pulm: No increased WOB  Abd: Nontender, gravid  Ext: 2+ LE edema b/l, nontender; 2+ DTR's, 1 beat clonus    HELLP labs pending    A/P:  Nhi Alexander is a 32 year old  at 24w5d admitted for pre eclampsia with severe features, asymmetric IUGR and initially reversed end diastolic flow, now intermittent absent. Now presenting with sustained severe range blood pressures despite multiple IV anti hypertensive. We have utilized hydralazine protocol, now transitioning to labetalol protocol. We recommend  delivery at this time given gestational age, breech presentation, with IUGR and poorly controlled blood pressures not responding to escalation of IV antihypertensives. We discussed classical  and implications for furture delivery. Discussed risks of surgery including bleeding requiring uterotonics, transfusion or additional procedures; injury to bowel, bladder, blood vessels, nerves, uterus, fallopian tubes, ovaries, baby; postoperative complications including infection, pain and DVT/PE. Consent romelia previously, valid. All questions answered. Pt agrees with plan of care.    - IV hydralazine 5 mg administered at 0  - IV hydralazine 10 mg administered at   - IV labetalol 20 mg administered at 5  - IV labetalol 40 mg administered at 6    Will continue labetalol algorithm with 80 mg if ongoing severe range blood pressures. NPO since . Type and screen valid. Anesthesia aware. Care team alerted,  including NICU, L&D Charge RN, Dr. Bran M staff, and Dr. Alfredo OB Staff.    Rosie Purdy MD  OB GYN PGY-3

## 2017-06-14 NOTE — ANESTHESIA PREPROCEDURE EVALUATION
Physical Exam  Normal systems: cardiovascular, pulmonary and dental    Airway   Mallampati: II  TM distance: >3 FB  Neck ROM: full    Dental     Cardiovascular       Pulmonary                     Anesthesia Plan      History & Physical Review  History and physical reviewed and following examination; no interval change.    ASA Status:  3 emergent.    NPO Status:  Waived due to emergency    Plan for Spinal and Periph. Nerve Block for postop pain   PONV prophylaxis:  Ondansetron (or other 5HT-3)       Postoperative Care  Postoperative pain management:  Neuraxial analgesia and Peripheral nerve block (Single Shot).      Consents

## 2017-06-14 NOTE — PROGRESS NOTES
Late Entry due to patient care    [facility]  Magnesium Check Note    S:   Patient states that her pain is much more well controlled. She denies any headache, neck pain, vision changes/spots in vision, chest pain, shortness of breath, RUQ or epigastric pain.      O:  Patient Vitals for the past 4 hrs:   BP Temp Temp src Pulse Resp SpO2   17 1554 121/75 97.6  F (36.4  C) Oral 69 18 98 %   17 1510 - - - - 16 97 %   17 1347 123/77 - - 75 - 96 %     Gen: awake, alert, answering questions appropriately, appears comfortable  CV:  RRR, no murmurs  Pulm:  CTAB, no wheezes or crackles  Abd:  Soft, diffusely tender abdomen with most tenderness in lower quadrants, non-distended  Ext:  Patellar reflexes 2+ b/l, 2+ bilateral brachioradialis reflexes, no clonus b/l, 1+ edema in bilateral lower extremities    I/O:  Date 17 07 - 06/15/17 0659   Shift 6710-7757 0231-0532 3354-0978 24 Hour Total   I  N  T  A  K  E   P.O. 820 200  1020    I.V. 1015 477.5  1492.5    Shift Total  (mL/kg) 1835  (16.75) 677.5  (6.18)  2512.5  (22.94)   O  U  T  P  U  T   Urine 1650 475  2125    Shift Total  (mL/kg) 1650  (15.06) 475  (4.34)  2125  (19.4)   Weight (kg) 109.54 109.54 109.54 109.54        UOP 316cc/hr over past 3 hours    A/P:  Nhi Alexander is a 32 year old  on PPD# 0 s/p PCCS. Pregnancy complicated by iAEDF, asymmetric IUGR, pre-eclampsia with SF, obesity, GBS positive. Delivered for sustained severe range BP's not responding to IV anti hypertensive medications. On magnesium with no s/s of magnesium toxicity at this time.    Preeclampsia with severe features   - BP: normal range blood pressures over the past 14 hours   - UOP: 316cc/hr over past 3 hours   - Symptoms: none   - HELLP labs WNL last checked at 0617 on 17   - Mag sulfate for seizure prophylaxis: 4g load at 2308> 2g/ hr following delivery at 0020.  Plan to discontinue magnesium at 0020 on 6/15/17   - Next  clinical mag check at 2000    Pain:  Pain well controlled now with 15mg oxycodone q 3 hours and tylenol    Postpartum:  - Routine postpartum care    Apple Turcios MD  Ob/Gyn, PGY-3  6/14/2017

## 2017-06-14 NOTE — ANESTHESIA CARE TRANSFER NOTE
Patient: Nhi Alexander    Procedure(s):   - Wound Class: II-Clean Contaminated    Diagnosis: pregnant  Diagnosis Additional Information: No value filed.    Anesthesia Type:   No value filed.     Note:  Airway :Room Air  Patient transferred to:PACU        Vitals: (Last set prior to Anesthesia Care Transfer)    CRNA VITALS  6/14/2017 0113 - 6/14/2017 0200      6/14/2017             EKG: NSR                Electronically Signed By: Salas Holder MD  June 14, 2017  2:00 AM

## 2017-06-14 NOTE — PLAN OF CARE
Problem: Goal Outcome Summary  Goal: Goal Outcome Summary  VSS. No lochia present. Fundus is so low it has been difficult to feel-providers aware. Biggest concern during her PACU recovery was pain management. IV fentanyl did not touch her pain. But Pt was able to tolerate ice chips and sips of water, so oral tylenol and oxycodone were given which began to be effective as Pt was leaving the PACU. Abdomen soft. Bowel sounds present. B/P stable on 2gr/magnesium. Pitocin titrated to 75ml/hr so total fluids running at 125ml/hr- per Dr Purdy. Urine output adequate- see doc flow. Pt visited baby before transfer to [facility]. Report given to Beverly CLARK RN at bedside. Dr Purdy update on Pt's lack of lochia- will continue to monitor carefully. Labs ordered.

## 2017-06-14 NOTE — PROVIDER NOTIFICATION
06/14/17 0500   Provider Notification   Provider Name/Title Dr Purdy   Method of Notification Electronic Page   Notification Reason Status Update   Pt transferred to room 7142 after visiting baby in NICU. Continues to have no lochia. Abdomen is soft, but unable to palpate fundus due to small size. VSS. Pain well managed for a small amount of time while visiting baby in NICU, but has returned to previous state after transferring her from one bed to the next and rolling her to get the old linens removed. Waiting on response back from provider.

## 2017-06-14 NOTE — PROVIDER NOTIFICATION
"   06/14/17 0940   Provider Notification   Provider Name/Title G2   Method of Notification Electronic Page   Notification Reason Medication Request  (Pt wondering if she have something \"more\" for pain control.)   Pt taking Tylenol as scheduled & Oxycodone every 3 hours, continues to c/o moderate to severe pain.  "

## 2017-06-14 NOTE — PROGRESS NOTES
OB/GYN Postpartum Note    S:  Patient is feeling better, pain 6/10 after fundal check, was 4/10 before fundal check. Roxicodone really helped. No N/V. Denies HA, changes in vision, SOB, CP, RUQ pain.  Lochia less than menses. Tolerating bites and sips without N/V. No flatus or BM. Canales in place. Planning on pumping. Baby doing well in NICU, visited with baby this am.     O:   Vitals:    06/14/17 0335 06/14/17 0350 06/14/17 0445 06/14/17 0515   BP: 127/73 121/75 130/77 130/83   Pulse:   72 79   Resp: 18 17 17 16   Temp: 99.5  F (37.5  C)  98.9  F (37.2  C) 98.9  F (37.2  C)   TempSrc: Axillary  Axillary Axillary   SpO2: 96% 98% 98% 98%   Weight:       Height:         General: A/O, in NAD  CV: RRR, no m/r/g  Resp: CTAB  Abdomen: soft, mildly distended, no guarding, fundus not palpable, soft, no peritoneal signs  Extremities: 2+ edema in BLE, no clonus, 2+ DTR's patellar      Intake/Output Summary (Last 24 hours) at 06/14/17 0702  Last data filed at 06/14/17 0615   Gross per 24 hour   Intake             2825 ml   Output             3250 ml   Net             -425 ml     UOP 75cc/hr for the last 2 hours    Labs:  Hemoglobin   Date Value Ref Range Status   06/14/2017 12.9 11.7 - 15.7 g/dL Final   06/13/2017 14.5 11.7 - 15.7 g/dL Final     AST   Result Value Ref Range    AST 16 0 - 45 U/L   ALT   Result Value Ref Range    ALT 19 0 - 50 U/L   CBC with platelets   Result Value Ref Range    WBC 14.4 (H) 4.0 - 11.0 10e9/L    RBC Count 4.57 3.8 - 5.2 10e12/L    Hemoglobin 14.5 11.7 - 15.7 g/dL    Hematocrit 41.4 35.0 - 47.0 %    MCV 91 78 - 100 fl    MCH 31.7 26.5 - 33.0 pg    MCHC 35.0 31.5 - 36.5 g/dL    RDW 13.1 10.0 - 15.0 %    Platelet Count 244 150 - 450 10e9/L   Creatinine   Result Value Ref Range    Creatinine 0.77 0.52 - 1.04 mg/dL    GFR Estimate 85 >60 mL/min/1.7m2    GFR Estimate If Black >90   GFR Calc   >60 mL/min/1.7m2   CBC with platelets   Result Value Ref Range    WBC 24.3 (H) 4.0 - 11.0  10e9/L    RBC Count 4.19 3.8 - 5.2 10e12/L    Hemoglobin 12.9 11.7 - 15.7 g/dL    Hematocrit 38.6 35.0 - 47.0 %    MCV 92 78 - 100 fl    MCH 30.8 26.5 - 33.0 pg    MCHC 33.4 31.5 - 36.5 g/dL    RDW 13.3 10.0 - 15.0 %    Platelet Count 235 150 - 450 10e9/L   ALT   Result Value Ref Range    ALT 20 0 - 50 U/L   AST   Result Value Ref Range    AST 16 0 - 45 U/L   Creatinine   Result Value Ref Range    Creatinine 0.76 0.52 - 1.04 mg/dL    GFR Estimate 87 >60 mL/min/1.7m2    GFR Estimate If Black >90   GFR Calc   >60 mL/min/1.7m2     A: Ms. Nhi Alexander is a 32 year old  POD #0 s/p PCCS. Pregnancy c/b iAEDF, asymmetric IUGR, pre-eclampsia with SF, obesity, GBS positive. Delivered for sustained severe range BP's not responding to IV anti hypertensive medications. Doing well in postoperative period. Fundal exams limited due to habitus and extremely  gestation, overall reassuring exam and VS with no s/s of hematometra or ongoing bleeding. On magnesium with no s/s of magnesium toxicity at this time.    P:  Pre-e w/ SF:  - Magnesium: 4g>2g/hr. No s/s of magnesium toxicity at this time. Continue for 24h postpartum  - HELLP labs: normal, repeat as clinically indicated  - UOP: adequate, continue strict I/O's and daily weights  - BP's: Normal range postpartum, low threshold for procardia per Dr. Bran  - Symptoms: None, continue to monitor closely  Heme 14.5 > EBL 1000 > 12.9. VSS, as above.  Feeding: Pumping  Contraception: Undecided, IVF pregnancy  PNC: Rh pos, rubella immune. No rhogam or MMR indicated.      Rosie Purdy MD  OB GYN PGY-3

## 2017-06-14 NOTE — OP NOTE
[facility]  Full Operative Progress Note     Surgery Date:  2017  Surgeon:  Shirley Alfredo MD  Assistants:  Rosie Purdy MD PGY-3    Aislinn Connors MD PGY-2  Pre-op Diagnosis:  1. Intrauterine pregnancy at 24w6d     2. Pre eclampsia with severe features     3. Asymmetric IUGR     4. Abnormal umbilical artery dopplers, initially persistently reversed, then intermittent absent end diastolic flow     5. Morbid Obesity     6. IVF pregnancy      7. Acutely worsening blood pressures  Post-op Diagnosis:  1. Same      2. Liveborn female infant   Procedure:  Primary classical  section with triple layer uterine closure via pfannenstiel incision    Anesthesia: Spinal  EBL:  1000 mL  IVF:  1000 mL crystalloid  UOP:  400 mL clear urine at the end of the case  Drains: Canales Catheter   Specimens: Cord blood, placenta  Complications: None   Indications:   Nhi Alexander is a 32 year old  at 24w6d admitted for persistently reversed end diastolic flow on umbilical artery dopplers at 24w0d. She was found to have pre eclampsia with severe features. She was initially expectantly managed and received 48h magnesium and betamethasone x2 on 17 and 17. On 17 she was started on Procardia XL. On 17 at 2100 she began to have sustained severe range blood pressures, not responding to IV hydralazine x2 and IV labetalol x2. Delivery was recommended for pre eclampsia with severe features with worsening blood pressures.  was recommended for fetal malpresentation.  The risks, benefits, and alternatives of  section were discussed with the patient, and she agreed to proceed.     Findings:   1. No intra abdominal or fascial adhesions  2. Clear yellow amniotic fluid  3. Liveborn female infant in breech presentation delivered en caul. Apgars and weight pending.  4. Cord gases pending  5. Placenta extremely small in appearance, plan to send for pathology  6. Normal uterus,  fallopian tubes, and ovaries. Normal appearing appendix visualized.  7. Recommend against labor in future pregnancies due to classical incision.      Procedure Details:   The patient was brought to the OR, where adequate spinal anesthesia was administered.  She was placed in the dorsal supine position with a slight leftward tilt. She was prepped and draped in the usual sterile fashion. A surgical time out was performed. A pfannenstiel skin incision was made with the scalpel, and carried down to the underlying fascia with sharp and blunt dissection. The fascia was incised in the midline, and the incision was extended laterally with the Bianchi scissors. The superior aspect of the fascia was grasped with the Kocher clamps and dissected off of the underlying rectus muscles with blunt and sharp dissection. Attention was then turned to the inferior aspect of the fascia, which was similarly dissected off of the underlying rectus muscles. The rectus muscles were  in the midline, and the peritoneum was entered bluntly, and the opening was extended with digital pressure. The bladder blade was placed. A classical incision was made from the level of the fundus to the bladder flap. This was extended bluntly and superiorly with bandage scissors until bulging membranes were encountered. The infant was noted to be in the breech position, and was delivered atraumatically en caul. Amniotomy performed after delivery. Thirty second delayed cord clamping performed. The cord was doubly clamped and cut, and the infant was handed off to the awaiting NICU staff. A segment of cord was cut and sent for cord blood typing and cord gases. The placenta was delivered with gentle traction on the umbilical cord and uterine massage. The uterus was exteriorized and cleared of all clots and debris. Uterine tone was noted to be adequate with 30 units of pitocin given through the running IV and uterine massage.  The hysterotomy was closed with a  running locked suture of 0 Vicryl, a running unlocked suture of 0 Monocryl, and a running unlocked baseball stitch of 3.0 Vicryl. The hysterotomy was noted to be hemostatic. The posterior cul-de-sac was suctioned and cleared of all clots and debris. The uterus was returned to the abdomen. The pericolic gutters were suctioned and cleared of all clots and debris. The hysterotomy was reexamined and noted to be hemostatic and covered with a layer of Interceed. The fascia and rectus muscles were examined and areas of oozing were controlled with electrocautery. A layer of Interceed was placed over the rectus muscles. The fascia was closed with a running 0 Vicryl suture. The subcutaneous tissue was irrigated and areas of oozing were controlled with electrocautery. The subcutaneous tissue was greater than 2 cm in thickness, and was therefore closed with running 3.0 Vicryl. The skin was closed with 4.0 Monocryl and covered with a sterile dressing.    All sponge, needle, and instrument counts were correct. The patient tolerated the procedure well, and was transferred to recovery in stable condition. Dr. Alfredo was present and scrubbed for the entirety of the procedure.     Rosie Purdy MD  OB GYN PGY-2  6/14/2017, 1:15 AM    Staff MD Note  I was present and scrubbed for the entire procedure noted above.  I agree with the description above and any necessary changes have been made by me.  Shirley Alfredo MD

## 2017-06-14 NOTE — PROVIDER NOTIFICATION
17 2245   Provider Notification   Provider Name/Title MARILYN Purdy MD   Method of Notification At Bedside   Request Evaluate in Person   Notification Reason Status Update   Patient has now received a dose of 20 mg Labetalol, and 40 mg Labetelol. BPs still being monitored. Dr. Purdy at bedside talking to patient and spouse about . Labs to be drawn. Patient NPO. Patient crying, asking appropriate questions. Will begin prepping patient for . FHR appropriate for gestational age. No contractions. Patient reports a pressure headache.

## 2017-06-14 NOTE — ANESTHESIA PROCEDURE NOTES
Peripheral Nerve Block Procedure Note    Staff:     Anesthesiologist:  TRACY DARNELL  Location: OB and OR AFTER induction  Procedure Start/Stop TImes:     patient identified, IV checked, site marked, risks and benefits discussed, informed consent, monitors and equipment checked, pre-op evaluation, at physician/surgeon's request and post-op pain management      Correct Patient: Yes      Correct Position: Yes      Correct Site: Yes      Correct Procedure: Yes      Correct Laterality:  Yes    Site Marked:  Yes  Procedure details:     Procedure:  TAP    Laterality:  Bilateral    Position:  Supine    Sterile Prep: chloraprep, alcohol swabs, mask and sterile gloves      Local skin infiltration:  None    Needle:  Insulated and short bevel    Needle gauge:  21    Needle length (inches):  4    Ultrasound: Yes      Ultrasound used to identify targeted nerve, plexus, or vascular structure and placed a needle adjacent to it      Permanent Image entered into patiient's record      Abnormal pain on injection: No      Blood Aspirated: No      Paresthesias:  No    Bleeding at site: No      Bolus via:  Needle    Infusion Method:  Single Shot    Complications:  None  Assessment/Narrative:     Injection made incrementally with aspirations every (mL):  5

## 2017-06-14 NOTE — ANESTHESIA POSTPROCEDURE EVALUATION
Patient: Nih Alexander    Procedure(s):   - Wound Class: II-Clean Contaminated    Diagnosis:pregnant  Diagnosis Additional Information: No value filed.    Anesthesia Type:  No value filed.    Note:  Anesthesia Post Evaluation    Patient location during evaluation: PACU  Patient participation: Able to fully participate in evaluation  Level of consciousness: awake and alert  Pain management: adequate  Airway patency: patent  Cardiovascular status: acceptable  Respiratory status: acceptable  Hydration status: acceptable  PONV: none     Anesthetic complications: None          Last vitals:  Vitals:    06/14/17 0145 06/14/17 0205 06/14/17 0239   BP: (!) 135/91 (!) 146/100 130/83   Pulse:      Resp: 18 13 19   Temp: 37.1  C (98.8  F)     SpO2:   97%         Electronically Signed By: Sarah Agosto MD  June 14, 2017  2:59 AM

## 2017-06-14 NOTE — PLAN OF CARE
Problem: Goal Outcome Summary  Goal: Goal Outcome Summary  Outcome: Improving  Pt transferred to [facility] at 0430. Bedside report was received. Room orientation completed. Magnesium sulfate dose verified with RN. PP assessment WNL. Vital signs stable. Output remains appropriate-hoffmann in place. Uterus unable to palpate, no lochia-provider is aware. Pain managed with Tylenol, 10mg Roxicodone and hot packs to abdomen for abdominal pain-see MAR. No signs of mag toxicity, pt denies h/a, RUQ pain, vision changes, h/a-educated pt to notify nurse of any symptoms. Will continue to monitor pt and with plan of care.

## 2017-06-14 NOTE — PROGRESS NOTES
[facility]  Magnesium Check Note    S:   Patient is doing ok, but continues to have pain control issues.  She states that after she is given the tylenol and oxycodone her pain is improved, but the effect wears off before she is due for her next dose.  The pain is over the incision and in the low abdomen.  She denies any headache, neck pain, vision changes/spots in vision, chest pain, shortness of breath, RUQ or epigastric pain.      O:  Patient Vitals for the past 4 hrs:   BP Temp Temp src Pulse Resp SpO2   06/14/17 1010 - - - - 16 97 %   06/14/17 0932 125/80 98.2  F (36.8  C) Oral 77 18 98 %   06/14/17 0827 124/73 98.2  F (36.8  C) Oral 77 18 98 %   06/14/17 0715 127/78 98.1  F (36.7  C) Oral 67 18 98 %     Gen: awake, alert, answering questions appropriately, appears mildly uncomfortable during palpation of abdomen  CV:  RRR, no murmurs  Pulm:  CTAB, no wheezes or crackles  Abd:  Soft, diffusely tender abdomen with most tenderness in lower quadrants, non-distended  Incision: pfannenstiel incision with overlying bandage is clean, dry, and intact  Ext:  Patellar reflexes 2+ b/l, 2+ bilateral brachioradialis reflexes, no clonus b/l, 1+ edema in bilateral lower extremities    I/O:  I/O last 3 completed shifts:  In: 2825 [P.O.:1900; I.V.:925]  Out: 3250 [Urine:2250; Blood:1000]     UOP 50cc/hr between 4a-7a.  600 ml of light yellow urine in hoffmann bag     Results for SIMIN CUTLER (MRN [id number]) as of 6/14/2017 09:47   Ref. Range 6/13/2017 00:31 6/13/2017 08:48 6/13/2017 22:57 6/14/2017 00:21 6/14/2017 06:17   Creatinine Latest Ref Range: 0.52 - 1.04 mg/dL 0.66  0.77  0.76   GFR Estimate Latest Ref Range: >60 mL/min/1.7m2 >90...  85  87   GFR Estimate If Black Latest Ref Range: >60 mL/min/1.7m2 >90...  >90...  >90...   ALT Latest Ref Range: 0 - 50 U/L 22  19  20   AST Latest Ref Range: 0 - 45 U/L 17  16  16   WBC Latest Ref Range: 4.0 - 11.0 10e9/L 13.4 (H)  14.4 (H)  24.3 (H)    Hemoglobin Latest Ref Range: 11.7 - 15.7 g/dL 13.2  14.5  12.9   Hematocrit Latest Ref Range: 35.0 - 47.0 % 38.9  41.4  38.6   Platelet Count Latest Ref Range: 150 - 450 10e9/L 218  244  235   RBC Count Latest Ref Range: 3.8 - 5.2 10e12/L 4.28  4.57  4.19   MCV Latest Ref Range: 78 - 100 fl 91  91  92   MCH Latest Ref Range: 26.5 - 33.0 pg 30.8  31.7  30.8   MCHC Latest Ref Range: 31.5 - 36.5 g/dL 33.9  35.0  33.4   RDW Latest Ref Range: 10.0 - 15.0 % 13.1  13.1  13.3       A/P:  Nhi Alexander is a 32 year old  on PPD# 0 s/p PCCS. Pregnancy complicated by iAEDF, asymmetric IUGR, pre-eclampsia with SF, obesity, GBS positive. Delivered for sustained severe range BP's not responding to IV anti hypertensive medications. On magnesium with no s/s of magnesium toxicity at this time.    Preeclampsia with severe features   - BP: normal range blood pressures over the past 7 hours   - UOP: 50cc/hr between 4a-7a  600 ml of light yellow urine in hoffmann bag    - Symptoms: none   - HELLP labs WNL last checked at 0617 on 17   - Mag sulfate for seizure prophylaxis: 4g load at 2308> 2g/ hr following delivery at 0020.  Plan to discontinue magnesium at 0020 on 6/15/17   - Next clinical mag check at 1400    Pain:  Patient with uncontrolled incisional and low abdominal pain.  Abdomen is soft, vitals are within normal limits, hemoglobin this morning with appropriate decrease based on blood loss.  Low suspicion for intra abdominal bleeding as cause of pain.    Plan to increase oxycodone to 10-15mg q3 hours.      Postpartum:  - Routine postpartum care    Apple Turcios MD  Ob/Gyn, PGY-3  2017, 9:44 AM

## 2017-06-14 NOTE — PLAN OF CARE
"Problem: Goal Outcome Summary  Goal: Goal Outcome Summary  Outcome: Improving  Pt states her pain is \"more tolerable\" after increase in Oxycodone dose. Able to sleep in between cares/visitors. Pt was able to stand @ the bedside with staff assist x2. Pt became dizzy & pt c/o not being able to hear. Pt was laid back down & began to \"feel much better.\" VSS. Magnesium continues to infuse @ 2gm/hr. Pumping with staff assist. Family here & supportive.       "

## 2017-06-15 LAB
BACTERIA SPEC CULT: ABNORMAL
HGB BLD-MCNC: 11 G/DL (ref 11.7–15.7)
MICRO REPORT STATUS: ABNORMAL
MICROORGANISM SPEC CULT: ABNORMAL
SPECIMEN SOURCE: ABNORMAL

## 2017-06-15 PROCEDURE — 25000132 ZZH RX MED GY IP 250 OP 250 PS 637: Performed by: OBSTETRICS & GYNECOLOGY

## 2017-06-15 PROCEDURE — 12000030 ZZH R&B OB INTERMEDIATE UMMC

## 2017-06-15 PROCEDURE — 36415 COLL VENOUS BLD VENIPUNCTURE: CPT | Performed by: OBSTETRICS & GYNECOLOGY

## 2017-06-15 PROCEDURE — 85018 HEMOGLOBIN: CPT | Performed by: OBSTETRICS & GYNECOLOGY

## 2017-06-15 RX ORDER — IBUPROFEN 600 MG/1
600 TABLET, FILM COATED ORAL EVERY 6 HOURS
Status: DISCONTINUED | OUTPATIENT
Start: 2017-06-15 | End: 2017-06-18 | Stop reason: HOSPADM

## 2017-06-15 RX ORDER — PHENAZOPYRIDINE HYDROCHLORIDE 100 MG/1
100 TABLET, FILM COATED ORAL 3 TIMES DAILY PRN
Status: DISCONTINUED | OUTPATIENT
Start: 2017-06-15 | End: 2017-06-18 | Stop reason: HOSPADM

## 2017-06-15 RX ORDER — OXYCODONE HYDROCHLORIDE 5 MG/1
5-10 TABLET ORAL
Status: DISCONTINUED | OUTPATIENT
Start: 2017-06-15 | End: 2017-06-18 | Stop reason: HOSPADM

## 2017-06-15 RX ADMIN — ACETAMINOPHEN 975 MG: 325 TABLET, FILM COATED ORAL at 03:39

## 2017-06-15 RX ADMIN — IBUPROFEN 600 MG: 600 TABLET ORAL at 07:39

## 2017-06-15 RX ADMIN — IBUPROFEN 600 MG: 600 TABLET ORAL at 13:22

## 2017-06-15 RX ADMIN — OXYCODONE HYDROCHLORIDE 15 MG: 5 TABLET ORAL at 00:38

## 2017-06-15 RX ADMIN — SENNOSIDES AND DOCUSATE SODIUM 2 TABLET: 8.6; 5 TABLET ORAL at 20:18

## 2017-06-15 RX ADMIN — OXYCODONE HYDROCHLORIDE 10 MG: 5 TABLET ORAL at 09:53

## 2017-06-15 RX ADMIN — OXYCODONE HYDROCHLORIDE 10 MG: 5 TABLET ORAL at 23:23

## 2017-06-15 RX ADMIN — ACETAMINOPHEN 975 MG: 325 TABLET, FILM COATED ORAL at 21:18

## 2017-06-15 RX ADMIN — ACETAMINOPHEN 975 MG: 325 TABLET, FILM COATED ORAL at 11:28

## 2017-06-15 RX ADMIN — OXYCODONE HYDROCHLORIDE 10 MG: 5 TABLET ORAL at 13:22

## 2017-06-15 RX ADMIN — OXYCODONE HYDROCHLORIDE 15 MG: 5 TABLET ORAL at 06:42

## 2017-06-15 RX ADMIN — SENNOSIDES AND DOCUSATE SODIUM 2 TABLET: 8.6; 5 TABLET ORAL at 07:40

## 2017-06-15 RX ADMIN — OXYCODONE HYDROCHLORIDE 10 MG: 5 TABLET ORAL at 20:18

## 2017-06-15 RX ADMIN — OXYCODONE HYDROCHLORIDE 15 MG: 5 TABLET ORAL at 03:38

## 2017-06-15 RX ADMIN — IBUPROFEN 600 MG: 600 TABLET ORAL at 20:18

## 2017-06-15 RX ADMIN — OXYCODONE HYDROCHLORIDE 10 MG: 5 TABLET ORAL at 16:22

## 2017-06-15 NOTE — PLAN OF CARE
Problem: Goal Outcome Summary  Goal: Goal Outcome Summary  Outcome: Improving  Patient stable. Taking tylenol, ibuprofen, and oxycodone for pain control. Ambulating in hallway.  BP WDL, reflexes normal, no clonus. Encouraged to use breast pump every 3 hours. Visited infant in NICU. Continue routine post mag care.

## 2017-06-15 NOTE — LACTATION NOTE
"D:  I met with Nhi; this is her 1st baby.  She is normally in good health, takes no medications, and has no history of breast/chest surgery or trauma (hx infertility x 4 years; conceived via IVF).  She has already started to pump.   I:  I gave her a folder of introductory materials and went over pumping guidelines.  I reviewed physiology of colostrum and milk production, pumping guidelines, and I gave her a log and encouraged her to use it.   I explained how to access the videos \"Hand Expression\" and \"Maximizing Milk Production\"; as well as other helpful books and websites.   We discussed hands-on pumping techniques and usefulness of a hands-free pumping bra.  We discussed skin to skin holding and how to reach your breastfeeding goals.  We talked about birth control and other medications during breastfeeding.  She verbalized understanding via teachback.  I advised her to call her insurance company about pump coverage.  I helped her with a pumping and hand expression.  A:  Mom has information she needs to initiate her supply.   P:  Will continue to provide lactation support.  Camilla Barakat, RNC, IBCLC      "

## 2017-06-15 NOTE — PLAN OF CARE
Problem: Goal Outcome Summary  Goal: Goal Outcome Summary  Outcome: Improving  PP assessment WNL. Vital signs have remained stable. Magnesium Sulfate discontinued at 0020. IV remains SL. Pt denies h/a, blurred vision, SOB, RUQ pain. Pt up to bathroom with assistance, has voided since hoffmann removal. Pain managed tonight with scheduled tylenol and 15mg Roxicodone for abdominal discomfort-see MAR. Utilizing double electric breastpump encouraged every 2-3 hours. Educated pt on hand massage and expression after pumping. SO remains in room, positive support sytem. Will continue with plan of care.

## 2017-06-15 NOTE — PROGRESS NOTES
"CLINICAL NUTRITION SERVICES - ASSESSMENT NOTE     Nutrition Prescription    RECOMMENDATIONS FOR MDs/PROVIDERS TO ORDER:  Continue prenatal vitamin for duration of breastfeeding    Malnutrition Status:    Patient does not meet two of the above criteria necessary for diagnosing malnutrition    Recommendations already ordered by Registered Dietitian (RD):  Continue regular diet     Future/Additional Recommendations:  Anticipate gradual wt loss during this admission.    Please consult if further needs arise.       REASON FOR ASSESSMENT  Nhi Alexander is a/an 32 year old female assessed by the dietitian for LOS    NUTRITION HISTORY  - Pt reports appetite has been getting better since her surgery. Previously to delivering, she was eating well.  - She had a  yesterday  at 24w5d.   - She is excited to go discharge and eat her usual foods, although unspecified what she likes to eat at home.     CURRENT NUTRITION ORDERS  Diet: Regular  Intake/Tolerance: she ate 100% of lunch, providing 860 kcal and 21 g protein    LABS  Labs reviewed    MEDICATIONS  Medications reviewed  - prenatal vitamin d/c on , however pt states she is still taking it    ANTHROPOMETRICS  Height: 167.6 cm (5' 6\")  Most Recent Weight: 110 kg (242 lb 9.6 oz)    Prepregnancy Weight: 97.9 kg (11/5/15)  IBW: 59.1 kg (166 %IBW)  BMI: Obesity Grade I BMI 30-34.9  Weight History: pt has gained approx 10.1 kg this pregnancy. Anticipate wt to decline post delivery back down to ~prepregnancy wt of 98 kg.   Wt Readings from Last 10 Encounters:   06/15/17 110 kg (242 lb 9.6 oz)   17 112 kg (247 lb)   17 106.6 kg (235 lb)   17 104.3 kg (230 lb)   17 103.4 kg (228 lb)   17 101.2 kg (223 lb)   17 100.7 kg (222 lb)   17 99.9 kg (220 lb 3.2 oz)   11/05/15 97.9 kg (215 lb 12.8 oz)   03/11/15 90.7 kg (200 lb)     Dosing Weight: 69 kg - adjusted wt from most recent prepregnancy wt    ASSESSED NUTRITION " NEEDS  Estimated Energy Needs: 9241-4053 kcals/day (25 - 30 kcals/kg + 330 kcal/day)  Justification: Breastfeeding  Estimated Protein Needs: 76-83 grams protein/day (1.1 - 1.2 grams of pro/kg)  Justification: Breastfeeding  Estimated Fluid Needs: 3.8 L/day (breastfeeding RDA)   Justification: Maintenance    PHYSICAL FINDINGS  See malnutrition section below.    MALNUTRITION  % Intake: Decreased intake does not meet criteria  % Weight Loss: None noted  Subcutaneous Fat Loss: None observed  Muscle Loss: None observed  Fluid Accumulation/Edema: Mild  Malnutrition Diagnosis: Patient does not meet two of the above criteria necessary for diagnosing malnutrition    NUTRITION DIAGNOSIS  No nutrition diagnosis at this time    INTERVENTIONS  Implementation  Pt expressed plan to breastfeed and continuing prenatal vitamin is encouraged for the duration of breastfeeding. Also discussed continued increased needs while breastfeeding and fluid needs. Pt verbalized she should try and drink 4 'water bottles' (32 oz) each day. Also discussed other foods/beverages can provide fluid to meet increased needs. Pt receptive and denied any questions at this time but encouraged to request to see RD again if questions or concerns arise during this admission. Encouraged adequate PO of food and fluids.     Monitoring/Evaluation  No nutrition follow-up warranted at this time.  Please consult if further needs arise.      Tiffany Shultz, Registration Eligable  Unit Pager:   [phone]

## 2017-06-15 NOTE — PROGRESS NOTES
[facility]  Postpartum Note    S:   Patient states that her pain is much more well controlled today. She denies any headache, neck pain, vision changes/spots in vision, chest pain, shortness of breath, RUQ or epigastric pain.  Pumping is going well. Baby doing well, had a blood transfusion yesterday. Walking without dizziness. Passing flatus. Voiding without issues.    O:  Patient Vitals for the past 4 hrs:   BP Temp Temp src Heart Rate Resp SpO2 Weight   06/15/17 0626 - - - - - - 110 kg (242 lb 9.6 oz)   06/15/17 0407 115/78 98.1  F (36.7  C) Oral 68 16 97 % -     Gen: awake, alert, answering questions appropriately, appears comfortable  CV:  RRR, no murmurs  Pulm:  CTAB, no wheezes or crackles  Abd:  Soft, diffusely tender abdomen with most tenderness in lower quadrants, non-distended  Ext:  Patellar reflexes 2+ b/l, 2+ bilateral brachioradialis reflexes, no clonus b/l, 1+ edema in bilateral lower extremities    I/O:    Intake/Output Summary (Last 24 hours) at 06/15/17 0644  Last data filed at 06/15/17 0625   Gross per 24 hour   Intake          6175.42 ml   Output             6500 ml   Net          -324.58 ml     Weight: 110  > 109.5  > 110 6/15 (kg)    Hemoglobin   Date Value Ref Range Status   2017 12.9 11.7 - 15.7 g/dL Final   2017 14.5 11.7 - 15.7 g/dL Final     A/P:  Nhi Alexander is a 32 year old  on PPD#1 s/p PCCS. Pregnancy complicated by iAEDF, asymmetric IUGR, pre-eclampsia with SF, obesity, GBS positive. Delivered for sustained severe range BP's not responding to IV anti hypertensive medications, no s/p magnesium for 24h postpartum.     Preeclampsia with severe features   - BP: normal range blood pressures, not requiring medication at this time   - UOP: Adequate. Weight neutral at this time.   - Symptoms: none   - HELLP labs WNL, last 6/15/17   - Mag sulfate: s/p 24h postpartum    Postpartum:    - Pain: Improved, currently in tylenol and  roxicodone 15 mg q3h. Consider NSAIDS if ongoing excellent BP's.   - Mood: EPDS 13, SW consulted   - Feed: Breast, pumping without issues   - PNL: Rh pos, rubella immune - no rhogam or MMR indicated   - Contraception: Undecided, IVF pregnancy   - Heme: Hgb 14.5>EBL 1000>12.9    Rosie Purdy MD  OB GYN PGY-3    I have seen and examined the patient without the resident. I have reviewed, edited, and agree with the note.   My findings are: Appears well. States pain and everything is getting better.  Abdomen: soft, NT, ND.   Incision bandage clean and dry.  Hemoglobin   Date Value Ref Range Status   06/14/2017 12.9 11.7 - 15.7 g/dL Final   06/13/2017 14.5 11.7 - 15.7 g/dL Final       Shannen Hutchins MD

## 2017-06-15 NOTE — PROGRESS NOTES
"[facility]  MATERNAL CHILD HEALTH   SOCIAL WORK PROGRESS NOTE      DATA:     Received order due to EDS=13. Please see primary , Racquel Gann's note from 17 for additional psychosocial information. This writer met with parents in post partum room to assess mood/coping. Nhi described the delivery as \"traumatic.\" She discussed how Tuesday things were looking good and before she knew it she was being \"rushed away\" for a  section. She expressed some concerns related to pain management during the procedure. She processed through her worries about her own physical health, as well as the health of her baby girl (parents have not yet chosen a name, considering Yohana). Nhi also identified experiencing self blame. Nhi reported that she has not slept much since delivery and is hopeful to sleep better tonight, as she is physically feeling better and requiring less medical interventions (i.e. Magnesium, blood draws, etc.). She and her  plan to go to the NICU today and Nhi identified \"feeling better\" and more able to process information.     Nhi denied any history of anxiety, depression, etc. In the past, she has seen a therapist \"a couples times\" on two different occassions for specific reason. She did not elaborate further. She has never been on any medications in the past re: to anxiety or depression. Nhi feels comfortable accessing supports as needed and is open to seeing a therapist again in the future if needed. She expressed her appreciation of the support she has received at the hospital. Parents reported no further questions or concerns at this time. Parents are aware of the boarding rooms and are unsure if they are interesting in staying in one once Nhi is discharged from post partum.     INTERVENTION:     This writer met with parents in post partum to assess mood/coping as mom scored a 13 on EDS. Assessed " mood/coping. Provided support and validation re: delivery experience and NICU admission. Normalized expressed emotions. Discussed the importance of sleep and encouraged mom to utilize her bedside nurse (i.e. decrease visitors, medication if needed). Provided psychoeducation about PMAD's vs. baby blues and risk factors associated. Encouraged parents to utilize supports as needed. Provided mom with PPSM resource and this writers contact information while primary  is out. Provided information about boarding rooms.     ASSESSMENT:     Mom easily engaged in conversation. Partner appeared supportive. She was understandably tearful throughout the visit as she processed her delivery experience and baby's NICU admission. She appears to be experiencing self blame related to the prematurity of her baby. She did seem receptive to the message from this writer that baby's prematurity was not her fault. Mom was in agreement of the importance of sleep and plans to take steps towards resting tonight. Parents able to verbalize their questions and concerns. No unmet needs reported at this time.     PLAN:     Primary , Racquel Gann will continue to follow for support and resources.     RIZWAN Miramontes, [facility]   Social Worker  Maternal Child Health   Phone: [phone]  Pager: [phone]

## 2017-06-16 LAB
ALBUMIN UR-MCNC: NEGATIVE MG/DL
APPEARANCE UR: CLEAR
BACTERIA #/AREA URNS HPF: ABNORMAL /HPF
BILIRUB UR QL STRIP: NEGATIVE
COLOR UR AUTO: ABNORMAL
GLUCOSE UR STRIP-MCNC: NEGATIVE MG/DL
HGB UR QL STRIP: ABNORMAL
KETONES UR STRIP-MCNC: NEGATIVE MG/DL
LEUKOCYTE ESTERASE UR QL STRIP: NEGATIVE
NITRATE UR QL: POSITIVE
PH UR STRIP: 6 PH (ref 5–7)
RBC #/AREA URNS AUTO: 2 /HPF (ref 0–2)
SP GR UR STRIP: 1 (ref 1–1.03)
SQUAMOUS #/AREA URNS AUTO: 2 /HPF (ref 0–1)
URN SPEC COLLECT METH UR: ABNORMAL
UROBILINOGEN UR STRIP-MCNC: NORMAL MG/DL (ref 0–2)
WBC #/AREA URNS AUTO: 1 /HPF (ref 0–2)

## 2017-06-16 PROCEDURE — 81001 URINALYSIS AUTO W/SCOPE: CPT | Performed by: STUDENT IN AN ORGANIZED HEALTH CARE EDUCATION/TRAINING PROGRAM

## 2017-06-16 PROCEDURE — 25000125 ZZHC RX 250: Performed by: OBSTETRICS & GYNECOLOGY

## 2017-06-16 PROCEDURE — 25000132 ZZH RX MED GY IP 250 OP 250 PS 637: Performed by: STUDENT IN AN ORGANIZED HEALTH CARE EDUCATION/TRAINING PROGRAM

## 2017-06-16 PROCEDURE — 12000030 ZZH R&B OB INTERMEDIATE UMMC

## 2017-06-16 PROCEDURE — 87186 SC STD MICRODIL/AGAR DIL: CPT | Performed by: OBSTETRICS & GYNECOLOGY

## 2017-06-16 PROCEDURE — 87088 URINE BACTERIA CULTURE: CPT | Performed by: OBSTETRICS & GYNECOLOGY

## 2017-06-16 PROCEDURE — 25000132 ZZH RX MED GY IP 250 OP 250 PS 637: Performed by: OBSTETRICS & GYNECOLOGY

## 2017-06-16 PROCEDURE — 25000125 ZZHC RX 250: Performed by: STUDENT IN AN ORGANIZED HEALTH CARE EDUCATION/TRAINING PROGRAM

## 2017-06-16 PROCEDURE — 87086 URINE CULTURE/COLONY COUNT: CPT | Performed by: OBSTETRICS & GYNECOLOGY

## 2017-06-16 RX ORDER — TAMSULOSIN HYDROCHLORIDE 0.4 MG/1
0.4 CAPSULE ORAL DAILY
Status: DISCONTINUED | OUTPATIENT
Start: 2017-06-17 | End: 2017-06-16

## 2017-06-16 RX ORDER — TAMSULOSIN HYDROCHLORIDE 0.4 MG/1
0.4 CAPSULE ORAL DAILY
Status: DISCONTINUED | OUTPATIENT
Start: 2017-06-16 | End: 2017-06-16

## 2017-06-16 RX ORDER — OXYCODONE AND ACETAMINOPHEN 5; 325 MG/1; MG/1
1-2 TABLET ORAL EVERY 4 HOURS PRN
Qty: 45 TABLET | Refills: 0 | Status: SHIPPED | OUTPATIENT
Start: 2017-06-16 | End: 2017-07-17

## 2017-06-16 RX ORDER — OXYBUTYNIN CHLORIDE 5 MG/1
5 TABLET ORAL 3 TIMES DAILY
Status: DISCONTINUED | OUTPATIENT
Start: 2017-06-16 | End: 2017-06-17

## 2017-06-16 RX ORDER — OXYBUTYNIN CHLORIDE 5 MG/1
5 TABLET ORAL 3 TIMES DAILY
Status: DISCONTINUED | OUTPATIENT
Start: 2017-06-17 | End: 2017-06-16

## 2017-06-16 RX ORDER — AMOXICILLIN 250 MG
1-2 CAPSULE ORAL 2 TIMES DAILY
Qty: 60 TABLET | Refills: 0 | Status: SHIPPED | OUTPATIENT
Start: 2017-06-16 | End: 2017-07-17

## 2017-06-16 RX ORDER — IBUPROFEN 600 MG/1
600 TABLET, FILM COATED ORAL EVERY 6 HOURS
Qty: 60 TABLET | Refills: 0 | Status: SHIPPED | OUTPATIENT
Start: 2017-06-16 | End: 2019-01-17

## 2017-06-16 RX ADMIN — LIDOCAINE HYDROCHLORIDE 10 ML: 20 JELLY TOPICAL at 16:36

## 2017-06-16 RX ADMIN — SENNOSIDES AND DOCUSATE SODIUM 2 TABLET: 8.6; 5 TABLET ORAL at 09:05

## 2017-06-16 RX ADMIN — OXYCODONE HYDROCHLORIDE 10 MG: 5 TABLET ORAL at 03:01

## 2017-06-16 RX ADMIN — IBUPROFEN 600 MG: 600 TABLET ORAL at 09:05

## 2017-06-16 RX ADMIN — OXYCODONE HYDROCHLORIDE 10 MG: 5 TABLET ORAL at 14:58

## 2017-06-16 RX ADMIN — ACETAMINOPHEN 975 MG: 325 TABLET, FILM COATED ORAL at 06:07

## 2017-06-16 RX ADMIN — OXYCODONE HYDROCHLORIDE 10 MG: 5 TABLET ORAL at 21:37

## 2017-06-16 RX ADMIN — ACETAMINOPHEN 975 MG: 325 TABLET, FILM COATED ORAL at 23:16

## 2017-06-16 RX ADMIN — IBUPROFEN 600 MG: 600 TABLET ORAL at 02:52

## 2017-06-16 RX ADMIN — OXYCODONE HYDROCHLORIDE 10 MG: 5 TABLET ORAL at 09:06

## 2017-06-16 RX ADMIN — OXYCODONE HYDROCHLORIDE 10 MG: 5 TABLET ORAL at 06:07

## 2017-06-16 RX ADMIN — PHENAZOPYRIDINE 100 MG: 100 TABLET ORAL at 12:13

## 2017-06-16 RX ADMIN — IBUPROFEN 600 MG: 600 TABLET ORAL at 14:58

## 2017-06-16 RX ADMIN — LIDOCAINE HYDROCHLORIDE 10 ML: 20 JELLY TOPICAL at 23:16

## 2017-06-16 RX ADMIN — PHENAZOPYRIDINE 100 MG: 100 TABLET ORAL at 18:06

## 2017-06-16 RX ADMIN — IBUPROFEN 600 MG: 600 TABLET ORAL at 21:37

## 2017-06-16 RX ADMIN — OXYCODONE HYDROCHLORIDE 10 MG: 5 TABLET ORAL at 18:06

## 2017-06-16 RX ADMIN — ACETAMINOPHEN 975 MG: 325 TABLET, FILM COATED ORAL at 14:58

## 2017-06-16 RX ADMIN — PHENAZOPYRIDINE 100 MG: 100 TABLET ORAL at 02:53

## 2017-06-16 RX ADMIN — OXYCODONE HYDROCHLORIDE 10 MG: 5 TABLET ORAL at 12:13

## 2017-06-16 NOTE — PLAN OF CARE
Problem: Goal Outcome Summary  Goal: Goal Outcome Summary  Outcome: Improving  Vss, postpartum assessment WDL. Taking ibuprofen, tylenol, and oxycodone for pain control. Using breast pump every 3 hours. Urine sent to lab. Encouraged to visit infant in NICU.

## 2017-06-16 NOTE — PROVIDER NOTIFICATION
06/15/17 2126   Provider Notification   Provider Name/Title Waylon   Method of Notification Phone   Request Evaluate-Remote   Notification Reason Vital Signs Change     Updated provider on mild range blood pressures (trending upward) and pt c/o of headache unrelieved with medication. Plan for provider to order PO reglan and benedryl. MD to place order.

## 2017-06-16 NOTE — PLAN OF CARE
Problem: Goal Outcome Summary  Goal: Goal Outcome Summary  Outcome: Improving  VSS. BPs trending upwards, provider aware. Pt c/o of headache unrelieved by ibuprofen and oxycodone. Tylenol given and encouraged sleep. Provider recommended PO reglan and benedryl. Reassessed pt after tylenol and pt asleep - informed pt's  about reglan and benedryl plan, he will call if she wakes up with headache. FOB present and supportive. Will continue to monitor.

## 2017-06-16 NOTE — CONSULTS
Made 3 attempts to meet with client - she was away visiting baby at NICU, busy with family/friends visiting, and at third attempt declined visit due to feeling overwhelmed per nurse report. Client requested outpatient appt instead. Nurse was instructed to have client call [facility] intake line to set up appt with this writer at [phone].

## 2017-06-16 NOTE — PLAN OF CARE
Problem: Goal Outcome Summary  Goal: Goal Outcome Summary  Outcome: Improving  Data: Vital signs within normal limits. Postpartum checks within normal limits - see flow record. Hypoactive reflexes. Denies symptoms headache, shortness of breath, vision changes, and/or epigastric pain. Patient eating and drinking normally. Patient able to empty bladder independently and is up ambulating. No apparent signs of infection. Incision healing well. Patient performing self cares. Pt breast pumping independently.   Action: Pain has been improving with Tylenol, Ibuprofen, and Oxycodone. Bladder pain was much improved with Pyridium.   Response: Support person, spouse, present.   Plan: Continue with the plan of care.

## 2017-06-16 NOTE — PROGRESS NOTES
"OB Postpartum Progress Note    S: Feeling ok this morning. Pain well controlled, on PO meds and she is tolerating lower dose of narcotics taking 2 tabs every 3 hours. Lochia improving, light. No headache now but had one overnight resolved with medications and rest. No vision changes, CP, SOB. Tolerating regular diet  without nausea or emesis, passing flatus and two BM. Ambulating without dizziness or lightheadedness. Voiding spontaneously without issues. Breast pumping going well. When asked about her mood she states \"don't ask\" and began crying. She notes she is mainly scared especially when she thinks about her daughter and how she might not make it. She notes she feels bad about worrying and doesn't want to cry all the time. She notes talking with SW really helped and she would like to keep talking with someone about her mood. She is open to trying medications as well if needed.     O:    Patient Vitals for the past 24 hrs:   BP Temp Temp src Heart Rate Resp Weight   06/16/17 0620 - - - - - 110.7 kg (244 lb)   06/16/17 0610 134/76 97.6  F (36.4  C) Oral 83 16 -   06/16/17 0302 125/88 97.7  F (36.5  C) Oral 73 16 -   06/15/17 2325 148/76 98.5  F (36.9  C) Oral 88 16 -   06/15/17 2115 145/87 - - 87 - -   06/15/17 2025 150/88 98.4  F (36.9  C) Oral 91 18 -   06/15/17 1814 (!) 132/96 99  F (37.2  C) Oral 88 18 -   06/15/17 1325 127/80 98  F (36.7  C) Oral 76 18 -   06/15/17 0732 124/71 98.5  F (36.9  C) Oral 71 16 -     Gen: NAD. Alert, oriented. Resting comfortably in bed. Tearful when discussion of mood came up.   CV: regular rate  Resp: regular rate, no increased work of breathing  Abd: Soft, moderately distended, appropriately tender, fundus firm below the umbilicus, appropriately tender  Incision: c/d/i, no erythema or induration, no active drainage, steristrips in place   Ext: 1 + pitting lower extremity edema bilaterally up to mid calves, improving since admit    Labs:   Hemoglobin   Date Value Ref Range " Status   06/15/2017 11.0 (L) 11.7 - 15.7 g/dL Final   2017 12.9 11.7 - 15.7 g/dL Final     A/P: Nhi Alexander is a 32 year old  who is POD#2 s/p PCCS for preeclampsia with severe features and poorly controlled BPs in severe range.  Doing well postpartum; with significant anxiety/depression secondary to  delivery, daughter in NICU after being born at 24w6d.  Questions and concerns addressed.     PreE w/ SF: s/p IV magnesium 24h PP.  - BPs normal to mild range, not on any oral antihypertensives at this time; continue to monitor closely  - 3-7 day postpartum BP check     Significant depression and anxiety; given still within 2 weeks of delivery may be normal adjustment to  delivery. Will monitor mood closely. EPDS 13 on 6/15   - Reassured her that mood changes can be normal and PP depression/anxiety happens to many women, some who need talk therapy and medications to help. Discussed importance of telling someone she trusts about how she is feeling and not hiding her feelings. Discussed options for talk therapy, she desires to continue this. Reviewed that her difficult pregnancy course and baby in NICU are risk factors for her having PP mood issues. Reassured her that she is doing a good job with everything and that this can happen to any mom postpartum and it is not her fault.    - Consider SSRI if mood not improved with talk therapy alone  - Continue SW involvement.  mental health consult ordered to help arrange talk therapy.     Postpartum cares  - Pain: Well controlled on PO meds  - Routine post-op cares - encouraged ambulation to help with bowel function/gas pain. May use simethicone prn for gas pain  - Heme: Hgb as above. EBL 1000. Fe not indicated at discharge. Currently asymptomatic  - Rh+/RI   - Breast pumping for baby in NICU; no issues  - Will discuss recommended 18 month spacing prior to next pregnancy and contraception prior to discharge; deferred today given patient  tearful and overwhelmed.   - Anticipate discharge POD#3-4; once meeting postpartum discharge goals: voiding & ambulating without difficulty, tolerating a regular diet without nausea and vomiting, passing flatus, pain well controlled on oral pain medicines, lochia appropriate, afebrile with vital signs within normal limits.  Also to ensure appropriate BP and no further inpatient monitoring warranted.    Lindsey Nielsen MD, MPH   OB/GYN Resident G4  2017 6:57 AM     Staff MD Note    I appreciate the note by Dr. Nielsen.  Any necessary changes have been made by me.  I evaluated the patient with the resident and agree with the assessment and plan.  Long discussion with patient today about her surgery, anesthesia and expectations for another pregnancy in the future secondary to her preeclampsia in second trimester and classical  section.  Answered all of patient's questions to the best of my ability and she appreciated this discussion today.  Very appropriate questions.      Shirley Alfredo MD

## 2017-06-17 PROCEDURE — 25000132 ZZH RX MED GY IP 250 OP 250 PS 637: Performed by: STUDENT IN AN ORGANIZED HEALTH CARE EDUCATION/TRAINING PROGRAM

## 2017-06-17 PROCEDURE — 25000132 ZZH RX MED GY IP 250 OP 250 PS 637: Performed by: OBSTETRICS & GYNECOLOGY

## 2017-06-17 PROCEDURE — 12000028 ZZH R&B OB UMMC

## 2017-06-17 PROCEDURE — 25000125 ZZHC RX 250: Performed by: OBSTETRICS & GYNECOLOGY

## 2017-06-17 RX ORDER — AMOXICILLIN 875 MG
875 TABLET ORAL EVERY 12 HOURS SCHEDULED
Status: DISCONTINUED | OUTPATIENT
Start: 2017-06-17 | End: 2017-06-17

## 2017-06-17 RX ORDER — ACETAMINOPHEN 325 MG/1
650 TABLET ORAL EVERY 6 HOURS PRN
Qty: 60 TABLET | Refills: 3 | Status: SHIPPED | OUTPATIENT
Start: 2017-06-17 | End: 2019-01-17

## 2017-06-17 RX ORDER — BENZOCAINE/MENTHOL 6 MG-10 MG
LOZENGE MUCOUS MEMBRANE 2 TIMES DAILY
Status: DISCONTINUED | OUTPATIENT
Start: 2017-06-17 | End: 2017-06-17

## 2017-06-17 RX ORDER — BENZOCAINE/MENTHOL 6 MG-10 MG
LOZENGE MUCOUS MEMBRANE 2 TIMES DAILY PRN
Status: DISCONTINUED | OUTPATIENT
Start: 2017-06-17 | End: 2017-06-18 | Stop reason: HOSPADM

## 2017-06-17 RX ORDER — AMOXICILLIN 875 MG
875 TABLET ORAL EVERY 12 HOURS SCHEDULED
Status: DISCONTINUED | OUTPATIENT
Start: 2017-06-17 | End: 2017-06-18 | Stop reason: HOSPADM

## 2017-06-17 RX ORDER — AMOXICILLIN 875 MG
875 TABLET ORAL EVERY 12 HOURS
Qty: 8 TABLET | Refills: 0 | Status: SHIPPED | OUTPATIENT
Start: 2017-06-17 | End: 2017-06-20

## 2017-06-17 RX ORDER — ACETAMINOPHEN 325 MG/1
650 TABLET ORAL EVERY 6 HOURS PRN
Status: DISCONTINUED | OUTPATIENT
Start: 2017-06-17 | End: 2017-06-18 | Stop reason: HOSPADM

## 2017-06-17 RX ADMIN — ACETAMINOPHEN 650 MG: 325 TABLET, FILM COATED ORAL at 19:41

## 2017-06-17 RX ADMIN — PHENAZOPYRIDINE 100 MG: 100 TABLET ORAL at 13:22

## 2017-06-17 RX ADMIN — OXYCODONE HYDROCHLORIDE 10 MG: 5 TABLET ORAL at 14:21

## 2017-06-17 RX ADMIN — ACETAMINOPHEN 650 MG: 325 TABLET, FILM COATED ORAL at 07:03

## 2017-06-17 RX ADMIN — PHENAZOPYRIDINE 100 MG: 100 TABLET ORAL at 04:06

## 2017-06-17 RX ADMIN — OXYCODONE HYDROCHLORIDE 10 MG: 5 TABLET ORAL at 04:07

## 2017-06-17 RX ADMIN — OXYBUTYNIN CHLORIDE 5 MG: 5 TABLET ORAL at 10:13

## 2017-06-17 RX ADMIN — IBUPROFEN 600 MG: 600 TABLET ORAL at 04:07

## 2017-06-17 RX ADMIN — LIDOCAINE HYDROCHLORIDE 10 ML: 20 JELLY TOPICAL at 08:18

## 2017-06-17 RX ADMIN — HYDROCORTISONE: 1 CREAM TOPICAL at 17:30

## 2017-06-17 RX ADMIN — OXYCODONE HYDROCHLORIDE 10 MG: 5 TABLET ORAL at 17:27

## 2017-06-17 RX ADMIN — PHENAZOPYRIDINE 100 MG: 100 TABLET ORAL at 21:32

## 2017-06-17 RX ADMIN — OXYCODONE HYDROCHLORIDE 10 MG: 5 TABLET ORAL at 00:27

## 2017-06-17 RX ADMIN — OXYCODONE HYDROCHLORIDE 10 MG: 5 TABLET ORAL at 07:02

## 2017-06-17 RX ADMIN — OXYBUTYNIN CHLORIDE 5 MG: 5 TABLET ORAL at 00:27

## 2017-06-17 RX ADMIN — OXYCODONE HYDROCHLORIDE 10 MG: 5 TABLET ORAL at 21:32

## 2017-06-17 RX ADMIN — SENNOSIDES AND DOCUSATE SODIUM 1 TABLET: 8.6; 5 TABLET ORAL at 07:39

## 2017-06-17 RX ADMIN — ACETAMINOPHEN 650 MG: 325 TABLET, FILM COATED ORAL at 13:22

## 2017-06-17 RX ADMIN — OXYCODONE HYDROCHLORIDE 10 MG: 5 TABLET ORAL at 10:16

## 2017-06-17 RX ADMIN — OXYBUTYNIN CHLORIDE 5 MG: 5 TABLET ORAL at 14:21

## 2017-06-17 RX ADMIN — IBUPROFEN 600 MG: 600 TABLET ORAL at 15:34

## 2017-06-17 RX ADMIN — IBUPROFEN 600 MG: 600 TABLET ORAL at 21:32

## 2017-06-17 RX ADMIN — IBUPROFEN 600 MG: 600 TABLET ORAL at 10:13

## 2017-06-17 RX ADMIN — SIMETHICONE CHEW TAB 80 MG 80 MG: 80 TABLET ORAL at 16:58

## 2017-06-17 RX ADMIN — AMOXICILLIN 875 MG: 875 TABLET, FILM COATED ORAL at 21:37

## 2017-06-17 NOTE — PROVIDER NOTIFICATION
Text-paged G2 with medication question.  Just got off the phone with you regarding the amoxicillin.  It is scheduled to start at 8pm, do you want to start now or wait.  Also pt is wondering if she should stop the Ditropan?  Thanks

## 2017-06-17 NOTE — PLAN OF CARE
Problem: Goal Outcome Summary  Goal: Goal Outcome Summary  Outcome: Improving  Data: Vital signs within normal limits. Postpartum checks within normal limits - see flow record. Patient eating and drinking normally. Patient able to empty bladder independently and is up ambulating. No apparent signs of infection. Incision healing well. Patient performing self cares and is able to care for infant.  Action: Patient medicated during the shift for pain and cramping. See MAR. Patient reassessed within 1 hour after each medication and pain was improved - patient stated she was comfortable. Patient education done about pumping, activity and lidocaine jelly. See flow record.  Response: Positive attachment behaviors observed with infant. Support persons not  present.   Plan: Anticipate discharge on POD#4.

## 2017-06-17 NOTE — PLAN OF CARE
Referral made to [facility] for early discharge/first baby. Note made that infant is in NICU at 24 weeks 6 days gestational age.

## 2017-06-17 NOTE — LACTATION NOTE
This note was copied from a baby's chart.  D:  I met with Nhi.  I:  I dispensed a Symphony and instructed her in its use.  She is getting 5-8ml per pumping, 8x/day and is logging and hand expressing. She will clarify with insurance if they will cover a Pump in Style as well.  A:  Mom has information and equipment she needs to initiate her supply.   P:  Will continue to provide lactation support.  Camilla Barakat, RNC, IBCLC

## 2017-06-17 NOTE — PLAN OF CARE
Problem: Goal Outcome Summary  Goal: Goal Outcome Summary  Outcome: Improving  VSS. Postpartum assessment WNL. Pt primarily c/o urethral discomfort/pain. Urojet cream applied and pyridium given. Pain also managed with tylenol, motrin, oxy, and heat. Pumping independently. Denies preeclampsia symptoms. +2 reflexes, no clonus. Had BM this evening so skipped senna. Spouse present and attentive.

## 2017-06-17 NOTE — PROGRESS NOTES
Post Partum Progress Note  POD#3    Subjective:  She is resting comfortably in bed this morning. She has no specific complaints this AM. Pain is improving and well controlled on current medication regimen. She is tolerating PO intake. Lochia present and less than a menses.  She is voiding without difficulty. She has only passed scant flatus and has not had a BM. She feels some worsening abdominal distension today. She is ambulating without dizziness or difficulty.  She denies headache, changes in vision, nausea/vomiting, chest pain, shortness of breath, RUQ pain, or worsening edema.  Plans to pump for breast feeding.    Objective:  Vitals:    17 1937 / 0023 17 0405 17 0800   BP: 139/85 136/73 120/78 130/83   BP Location:       Pulse: 65 81 61 58   Resp:    Temp: 98.3  F (36.8  C) 98.2  F (36.8  C)  98.1  F (36.7  C)   TempSrc: Oral Oral  Oral   SpO2:  97%  98%   Weight:       Height:           General: NAD. A&Ox3.  CV: Regular rate, well perfused.   Pulm: Normal respiratory effort.  Abd: Soft, non-tender, non-distended. Fundus is firm and 3 cm below the umbilicus.    Incision: Pfannenstiel skin incision is clean, dry, intact  Ext: 1+ lower extremity edema bilaterally. No calf tenderness.    Assessment/Plan:  Nhi Alexander is a 32 year old  female who is POD#3 s/p PCCS for preeclampsia with severe features, poorly controlled BPs in severe range.    - Encourage routine post-operative goals including ambulation and incentive spirometry  - PNC: Rh positive. Rubella immune. No intervention indicated.  - Pain: controlled on oral medications  - Heme: Hgb 14.5>EBL 1000>12.9. Vital signs stable.  - GI: continue anti-emetics and stool softeners as needed.  - : Voiding spontaneously. Ongoing dysuria, urine culture pending.  - Infant: in NICU  - Feeding: Plans on pumping for breast feeding.  - BC: Not discussed  - Significant depression/anxiety: EPDS 13, s/p SW consultation,  reports stable mood today.  mental health consult ordered to help arrange talk therapy.   - Preeclampsia with severe features: s/p 24 hrs IV magnesium. BPs normal to low mild range. No oral antihypertensives indicated at this time. Ongoing lower extremity edema stable. Diuresis excellent. Plan ongoing close blood pressure monitoring.     Discharge to home likely tomorrow on POD#4    Melyssa Cote MD  OBGYN PGY-3  [phone]  9:31 AM 2017    [facility] staff:  Appreciate note by Dr. Cote.  I have seen and examined the patient without the resident. I have reviewed, edited, and agree with the note.    Await urine culture.  Continue f/u psych and f/u PP.     Lelo Costello MD, FACOG  2017  10:07 AM

## 2017-06-17 NOTE — PLAN OF CARE
Problem: Goal Outcome Summary  Goal: Goal Outcome Summary  Outcome: No Change  VSS. Postpartum assessment WNL. Pt mainly c/o urethral pain. Topical lidocaine, pyridium, and ditropan given as well as tylenol, ibuprofen, oxy and heat packs. Pumping well independently every 4 hours overnight. Denies symptoms r/t preeclampsia, except for swelling. Reflexes +2, no clonus present.

## 2017-06-18 VITALS
RESPIRATION RATE: 18 BRPM | OXYGEN SATURATION: 98 % | WEIGHT: 245.3 LBS | DIASTOLIC BLOOD PRESSURE: 88 MMHG | HEART RATE: 60 BPM | BODY MASS INDEX: 39.42 KG/M2 | HEIGHT: 66 IN | TEMPERATURE: 97.9 F | SYSTOLIC BLOOD PRESSURE: 144 MMHG

## 2017-06-18 PROCEDURE — 25000132 ZZH RX MED GY IP 250 OP 250 PS 637: Performed by: OBSTETRICS & GYNECOLOGY

## 2017-06-18 RX ADMIN — IBUPROFEN 600 MG: 600 TABLET ORAL at 03:22

## 2017-06-18 RX ADMIN — OXYCODONE HYDROCHLORIDE 10 MG: 5 TABLET ORAL at 09:12

## 2017-06-18 RX ADMIN — OXYCODONE HYDROCHLORIDE 10 MG: 5 TABLET ORAL at 00:24

## 2017-06-18 RX ADMIN — OXYCODONE HYDROCHLORIDE 10 MG: 5 TABLET ORAL at 03:22

## 2017-06-18 RX ADMIN — SENNOSIDES AND DOCUSATE SODIUM 1 TABLET: 8.6; 5 TABLET ORAL at 09:12

## 2017-06-18 RX ADMIN — ACETAMINOPHEN 650 MG: 325 TABLET, FILM COATED ORAL at 11:41

## 2017-06-18 RX ADMIN — HYDROCORTISONE: 25 CREAM TOPICAL at 00:30

## 2017-06-18 RX ADMIN — LIDOCAINE HYDROCHLORIDE 10 ML: 20 JELLY TOPICAL at 00:27

## 2017-06-18 RX ADMIN — ACETAMINOPHEN 650 MG: 325 TABLET, FILM COATED ORAL at 05:24

## 2017-06-18 RX ADMIN — IBUPROFEN 600 MG: 600 TABLET ORAL at 09:12

## 2017-06-18 RX ADMIN — AMOXICILLIN 875 MG: 875 TABLET, FILM COATED ORAL at 11:35

## 2017-06-18 RX ADMIN — LIDOCAINE HYDROCHLORIDE 10 ML: 20 JELLY TOPICAL at 05:25

## 2017-06-18 RX ADMIN — OXYCODONE HYDROCHLORIDE 10 MG: 5 TABLET ORAL at 06:17

## 2017-06-18 NOTE — PROGRESS NOTES
Post Partum Progress Note  POD#4    Subjective:  She is resting comfortably in bed this morning. Pain is improving and well controlled on current medication regimen. She is tolerating PO intake. Lochia present and mild.  She is voiding without difficulty. She has  passed flatus and has  had a BM. She is ambulating without dizziness or difficulty.  She denies headache, changes in vision, nausea/vomiting, chest pain, shortness of breath, RUQ pain, or worsening edema.  Plans to breast feed.    Objective: Temp: 97.9  F (36.6  C) Temp src: Oral BP: 141/88 Pulse: 60 Heart Rate: 55 Resp: 20          Vitals:    / 1400 / 1545 / 1728 / 1945   BP: 124/71 129/79 138/74 134/81   BP Location:       Pulse: 60      Resp:  16 18 18   Temp:  98.1  F (36.7  C)  97.5  F (36.4  C)   TempSrc:  Oral  Oral   SpO2:       Weight:       Height:           General: NAD. A&Ox3.  CV: Regular rate, well perfused.   Pulm: Normal respiratory effort.  Abd: Soft, non-tender, non-distended. Fundus is firm and 2 cm below the umbilicus.    Incision:  incision is clean, dry, intact  Ext:  lower extremity edema bilaterally. No calf tenderness.        Assessment/Plan:  Nhi Alexander is a 32 year old  female who is POD#3 s/p PCCS for preeclampsia with severe features, poorly controlled BPs in severe range.     - Encourage routine post-operative goals including ambulation and incentive spirometry  - PNC: Rh positive. Rubella immune. No intervention indicated.  - Pain: controlled on oral medications  - Heme: Hgb 14.5>EBL 1000>12.9. Vital signs stable. Blood loss anemia stable and asymptomatic.  - GI: continue anti-emetics and stool softeners as needed.  - : Voiding spontaneously. Ongoing dysuria, urine culture pending.  - Infant: in NICU  - Feeding: Plans on pumping for breast feeding.  - BC: undecided  - Significant depression/anxiety: EPDS 13, s/p SW consultation, reports stable mood today.  mental health  consult ordered to help arrange talk therapy.   - Preeclampsia with severe features: s/p 24 hrs IV magnesium. BPs normal to low mild range. No oral antihypertensives indicated at this time. Ongoing lower extremity edema stable. Diuresis excellent. Plan ongoing close blood pressure monitoring. Plan close BP follow up within 1 week.      Discharge to home later today on POD#4    I have seen and examined the patient without the resident. I have reviewed, edited, and agree with the note.     Shannen Hutchins MD

## 2017-06-18 NOTE — PLAN OF CARE
Problem: Goal Outcome Summary  Goal: Goal Outcome Summary  Outcome: Improving  VSS and postpartum assessments WDL.  Up ad dayo with steady gait.  Independent with cares.  Pumping independently for infant and bringing expressed colostrum to infant with NICU visits.  Pain managed with tylenol, ibuprofen, oxycodone and pyridium per MAR.  Pt started on amoxicillin this shift for UTI.   present and supportive.  Pt developed headache unchanged with medication and c/o worsening edema.  BPs monitored and G2 notified.  Instructed to keep monitoring pt and re-notify if pt worsens or is unable to rest due to headache.  Will continue with postpartum cares and education per plan of care.

## 2017-06-18 NOTE — DISCHARGE INSTRUCTIONS
Postop  Birth Instructions    Activity       Do not lift more than 10 pounds for 6 weeks after surgery.  Ask family and friends for help when you need it.    No driving until you have stopped taking your pain medications (usually two weeks after surgery).    No heavy exercise or activity for 6 weeks.  Don't do anything that will put a strain on your surgery site.    Don't strain when using the toilet.  Your care team may prescribe a stool softener if you have problems with your bowel movements.     To care for your incision:       Keep the incision clean and dry.    Do not soak your incision in water. No swimming or hot tubs until it has fully healed. You may soak in the bathtub if the water level is below your incision.    Do not use peroxide, gel, cream, lotion, or ointment on your incision.    Adjust your clothes to avoid pressure on your surgery site (check the elastic in your underwear for example).     You may see a small amount of clear or pink drainage and this is normal.  Check with your health care provider:       If the drainage increases or has an odor.    If the incision reddens, you have swelling, or develop a rash.    If you have increased pain and the medicine we prescribed doesn't help.    If you have a fever above 100.4 F (38 C) with or without chills when placing thermometer under your tongue.   The area around your incision (surgery wound), will feel numb.  This is normal. The numbness should go away in less than a year.     Keep your hands clean:  Always wash your hands before touching your incision (surgery wound). This helps reduce your risk of infection. If your hands aren't dirty, you may use an alcohol hand-rub to clean your hands. Keep your nails clean and short.    Call your healthcare provider if you have any of these symptoms:       You soak a sanitary pad with blood within 1 hour, or you see blood clots larger than a golf ball.    Bleeding that lasts more than 6  weeks.    Vaginal discharge that smells bad.    Severe pain, cramping or tenderness in your lower belly area.    A need to urinate more frequently (use the toilet more often), more urgently (use the toilet very quickly), or it burns when you urinate.    Nausea and vomiting.    Redness, swelling or pain around a vein in your leg.    Problems breastfeeding or a red or painful area on your breast.    Chest pain and cough or are gasping for air.    Problems with coping with sadness, anxiety or depression. If you have concerns about hurting yourself or the baby, call your provider immediately.      You have questions or concerns after you return home.     [facility] on Tuesday or Wednesday next week. Follow up in 6 weeks for post delivery check up. Follow up sooner if you are having concerns or mood problems.      Call [facility] intake line to set up an appointment with Meron Ovalle at [phone] for out patient counseling.

## 2017-06-18 NOTE — PLAN OF CARE
Problem: Postpartum ( Delivery) (Adult)  Goal: Signs and Symptoms of Listed Potential Problems Will be Absent or Manageable (Postpartum)  Signs and symptoms of listed potential problems will be absent or manageable by discharge/transition of care (reference Postpartum ( Delivery) (Adult) CPG).   Outcome: Adequate for Discharge Date Met:  17  Pt d/c to home POD4 in stable condition. Discharge medications and instructions reviewed, pt states understanding of post-partum pre-eclampsia symptoms to watch for. Plan is for incision+mood check on Tuesday () or Wednesday () and 6 wk PP exam.

## 2017-06-18 NOTE — PLAN OF CARE
Problem: Goal Outcome Summary  Goal: Goal Outcome Summary  Outcome: Improving  Data: Vital signs within normal limits. Postpartum checks within normal limits - see flow record. Pt stated she had headache most of the night, after 4 hour nap - resolved. Edematous feet, ankles, legs - pt encouraged to balance rest and activity and while in bed to elevate feet on pillow. Patient eating and drinking normally. Patient able to empty bladder independently and is up ambulating. Incision healing well and open to air. Pumping independently for  in NICU. Seems to be in good spirits, occasional breakdowns overnight but states she is coping well.   Action: Patient medicated during the shift for bladder pain and painful urination.  Response: Positive attachment with  verbalized.   Will continue to monitor and provide support.

## 2017-06-18 NOTE — PROVIDER NOTIFICATION
"Pt status update:  Pt has developed a headache that \"is similar to the headaches she was having during pregnancy\" that is unchanged with pain medications.  Pt also c/o worsening edema in legs and feet.  UFj=227/79, 138/74, 134/81.    "

## 2017-06-19 ENCOUNTER — TELEPHONE (OUTPATIENT)
Dept: OBGYN | Facility: CLINIC | Age: 32
End: 2017-06-19

## 2017-06-19 ENCOUNTER — HOSPITAL ENCOUNTER (EMERGENCY)
Facility: CLINIC | Age: 32
Discharge: HOME | End: 2017-06-19
Attending: FAMILY MEDICINE | Admitting: FAMILY MEDICINE
Payer: COMMERCIAL

## 2017-06-19 ENCOUNTER — NURSE TRIAGE (OUTPATIENT)
Dept: NURSING | Facility: CLINIC | Age: 32
End: 2017-06-19

## 2017-06-19 VITALS
RESPIRATION RATE: 16 BRPM | TEMPERATURE: 98 F | DIASTOLIC BLOOD PRESSURE: 95 MMHG | WEIGHT: 245.5 LBS | HEART RATE: 62 BPM | OXYGEN SATURATION: 97 % | SYSTOLIC BLOOD PRESSURE: 144 MMHG | BODY MASS INDEX: 39.62 KG/M2

## 2017-06-19 DIAGNOSIS — N39.0 URINARY TRACT INFECTION: Primary | ICD-10-CM

## 2017-06-19 LAB
ALBUMIN SERPL-MCNC: 2.5 G/DL (ref 3.4–5)
ALBUMIN UR-MCNC: ABNORMAL MG/DL
ALP SERPL-CCNC: 61 U/L (ref 40–150)
ALT SERPL W P-5'-P-CCNC: 24 U/L (ref 0–50)
ANION GAP SERPL CALCULATED.3IONS-SCNC: 9 MMOL/L (ref 3–14)
APPEARANCE UR: CLEAR
AST SERPL W P-5'-P-CCNC: 18 U/L (ref 0–45)
BACTERIA #/AREA URNS HPF: ABNORMAL /HPF
BASOPHILS # BLD AUTO: 0 10E9/L (ref 0–0.2)
BASOPHILS NFR BLD AUTO: 0.4 %
BILIRUB SERPL-MCNC: 0.2 MG/DL (ref 0.2–1.3)
BILIRUB UR QL STRIP: ABNORMAL
BUN SERPL-MCNC: 19 MG/DL (ref 7–30)
CALCIUM SERPL-MCNC: 8.6 MG/DL (ref 8.5–10.1)
CHLORIDE SERPL-SCNC: 109 MMOL/L (ref 94–109)
CO2 SERPL-SCNC: 24 MMOL/L (ref 20–32)
COLOR UR AUTO: ABNORMAL
CREAT SERPL-MCNC: 0.93 MG/DL (ref 0.52–1.04)
DIFFERENTIAL METHOD BLD: ABNORMAL
EOSINOPHIL # BLD AUTO: 0.3 10E9/L (ref 0–0.7)
EOSINOPHIL NFR BLD AUTO: 3.7 %
ERYTHROCYTE [DISTWIDTH] IN BLOOD BY AUTOMATED COUNT: 13.4 % (ref 10–15)
GFR SERPL CREATININE-BSD FRML MDRD: 69 ML/MIN/1.7M2
GLUCOSE SERPL-MCNC: 84 MG/DL (ref 70–99)
GLUCOSE UR STRIP-MCNC: ABNORMAL MG/DL
HCT VFR BLD AUTO: 32.3 % (ref 35–47)
HGB BLD-MCNC: 10.6 G/DL (ref 11.7–15.7)
HGB UR QL STRIP: ABNORMAL
IMM GRANULOCYTES # BLD: 0 10E9/L (ref 0–0.4)
IMM GRANULOCYTES NFR BLD: 0.5 %
KETONES UR STRIP-MCNC: ABNORMAL MG/DL
LEUKOCYTE ESTERASE UR QL STRIP: ABNORMAL
LYMPHOCYTES # BLD AUTO: 2 10E9/L (ref 0.8–5.3)
LYMPHOCYTES NFR BLD AUTO: 23.8 %
MCH RBC QN AUTO: 30.6 PG (ref 26.5–33)
MCHC RBC AUTO-ENTMCNC: 32.8 G/DL (ref 31.5–36.5)
MCV RBC AUTO: 93 FL (ref 78–100)
MONOCYTES # BLD AUTO: 0.8 10E9/L (ref 0–1.3)
MONOCYTES NFR BLD AUTO: 9.8 %
NEUTROPHILS # BLD AUTO: 5.1 10E9/L (ref 1.6–8.3)
NEUTROPHILS NFR BLD AUTO: 61.8 %
NITRATE UR QL: ABNORMAL
NRBC # BLD AUTO: 0 10*3/UL
NRBC BLD AUTO-RTO: 0 /100
PH UR STRIP: ABNORMAL PH (ref 5–7)
PLATELET # BLD AUTO: 273 10E9/L (ref 150–450)
POTASSIUM SERPL-SCNC: 4.4 MMOL/L (ref 3.4–5.3)
PROT SERPL-MCNC: 6.3 G/DL (ref 6.8–8.8)
RBC # BLD AUTO: 3.46 10E12/L (ref 3.8–5.2)
RBC #/AREA URNS AUTO: 1 /HPF (ref 0–2)
SODIUM SERPL-SCNC: 142 MMOL/L (ref 133–144)
SP GR UR STRIP: 1.01 (ref 1–1.03)
SQUAMOUS #/AREA URNS AUTO: 2 /HPF (ref 0–1)
URATE SERPL-MCNC: 6.3 MG/DL (ref 2.6–6)
URN SPEC COLLECT METH UR: ABNORMAL
UROBILINOGEN UR STRIP-MCNC: ABNORMAL MG/DL (ref 0–2)
WBC # BLD AUTO: 8.3 10E9/L (ref 4–11)
WBC #/AREA URNS AUTO: <1 /HPF (ref 0–2)

## 2017-06-19 PROCEDURE — 84550 ASSAY OF BLOOD/URIC ACID: CPT | Performed by: FAMILY MEDICINE

## 2017-06-19 PROCEDURE — 36415 COLL VENOUS BLD VENIPUNCTURE: CPT | Performed by: FAMILY MEDICINE

## 2017-06-19 PROCEDURE — 99284 EMERGENCY DEPT VISIT MOD MDM: CPT | Mod: Z6 | Performed by: FAMILY MEDICINE

## 2017-06-19 PROCEDURE — 99284 EMERGENCY DEPT VISIT MOD MDM: CPT | Performed by: FAMILY MEDICINE

## 2017-06-19 PROCEDURE — 81001 URINALYSIS AUTO W/SCOPE: CPT | Performed by: FAMILY MEDICINE

## 2017-06-19 PROCEDURE — 85025 COMPLETE CBC W/AUTO DIFF WBC: CPT | Performed by: FAMILY MEDICINE

## 2017-06-19 PROCEDURE — 80053 COMPREHEN METABOLIC PANEL: CPT | Performed by: FAMILY MEDICINE

## 2017-06-19 PROCEDURE — 25000132 ZZH RX MED GY IP 250 OP 250 PS 637: Performed by: FAMILY MEDICINE

## 2017-06-19 RX ORDER — NIFEDIPINE 30 MG/1
30 TABLET, EXTENDED RELEASE ORAL DAILY
Qty: 14 TABLET | Refills: 0 | Status: SHIPPED | OUTPATIENT
Start: 2017-06-19 | End: 2017-06-20

## 2017-06-19 RX ORDER — OXYCODONE AND ACETAMINOPHEN 5; 325 MG/1; MG/1
1 TABLET ORAL ONCE
Status: COMPLETED | OUTPATIENT
Start: 2017-06-19 | End: 2017-06-19

## 2017-06-19 RX ORDER — IBUPROFEN 800 MG/1
800 TABLET, FILM COATED ORAL ONCE
Status: COMPLETED | OUTPATIENT
Start: 2017-06-19 | End: 2017-06-19

## 2017-06-19 RX ORDER — NIFEDIPINE 30 MG/1
30 TABLET, EXTENDED RELEASE ORAL ONCE
Status: COMPLETED | OUTPATIENT
Start: 2017-06-19 | End: 2017-06-19

## 2017-06-19 RX ADMIN — IBUPROFEN 800 MG: 800 TABLET, FILM COATED ORAL at 21:55

## 2017-06-19 RX ADMIN — OXYCODONE HYDROCHLORIDE AND ACETAMINOPHEN 1 TABLET: 5; 325 TABLET ORAL at 19:42

## 2017-06-19 RX ADMIN — NIFEDIPINE 30 MG: 30 TABLET, FILM COATED, EXTENDED RELEASE ORAL at 21:55

## 2017-06-19 NOTE — TELEPHONE ENCOUNTER
Reason for Disposition    [1] BP  >= 160 / 100 AND [2] cardiac or neurologic symptoms    (e.g., chest pain, difficulty breathing, unsteady gait, blurred vision)     Just delivered baby due to pre eclapmsia on June 14th.  BP's controlled in hospital and was discharged to home on the 14th.  Woke up from nap and has a headache and BP readings of 175-115.    Reviewed sxs to call 911.    Additional Information    Negative: Difficult to awaken or acting confused  (e.g., disoriented, slurred speech)    Negative: Severe difficulty breathing (e.g., struggling for each breath, speaks in single words)    Negative: [1] Weakness of the face, arm or leg on one side of the body AND [2] new onset    Negative: [1] Numbness (i.e., loss of sensation) of the face, arm or leg on one side of the body AND [2] new onset    Negative: [1] Chest pain lasts > 5 minutes AND [2] history of heart disease  (i.e., heart attack, bypass surgery, angina, angioplasty, CHF)    Negative: [1] Chest pain AND [2] took nitrogylcerin AND [3] pain was not relieved    Negative: Sounds like a life-threatening emergency to the triager    Negative: Symptom is main concern  (e.g., headache, chest pain)    Negative: Low blood pressure is main concern    Protocols used: HIGH BLOOD PRESSURE-ADULT-AH

## 2017-06-19 NOTE — TELEPHONE ENCOUNTER
Received call from Nhi stating she was d/c from hospital yesterday and has been taking amoxicillin for suspected UTI x2 days but her symptoms of painful urination have not gone away. She is wondering if the urine culture results are back.    Review of EPIC shows preliminary results and susceptibilities are available and show UTI. Advised nurse would talk with on-call provider for different prescription.    Spoke with Dr. Turcios who ordered augmentin 875-125 BID x 5 days.    Gave above information to Nhi. She agreed with plan. She stated that she is pumping but is very far ahead of what her preemie can take right now so she is OK to pump and dump if needed. Advised that we will try this antibiotic first but that if she has no resolution of symptoms in 48 hrs or if symptoms worsen to call back.     She also asked if she can take AZO for the pain. Reviewed Up To Date which stated it hasn't been studied so it is unknown if its safe for breastfeeding. She had no further questions.

## 2017-06-19 NOTE — ED AVS SNAPSHOT
[facility], Emergency Department    [address]    [place] [place] [address]    Phone:  [phone]    Fax:  [phone]                                       Nhi Alexander   MRN: [id number]    Department:  [facility], Emergency Department   Date of Visit:  6/19/2017           Patient Information     Date Of Birth          1985        Your diagnoses for this visit were:     Preeclampsia in postpartum period        You were seen by Steve Bates MD.        Discharge Instructions       Begin procardia xl tomorrow at supper- you got your dose in the ed for today  You need to be rechecked at your ob/gyn clinic tomorrow or your primary care clinic- the blood pressure needs to be rechecked and maybe labs repeated  Get a new blood pressure cuff tomorrow - check in the morning and once at night. Your cuff from home is not correlating well with our monitors    Future Appointments        Provider Department Dept Phone Center    7/6/2017 8:30 AM LAB [facility] [place] [phone] [place]    7/6/2017 8:50 AM EWA [place] [facility] [place] [phone] [place]    7/20/2017 1:00 PM Kellie Beatty APRCHERYL [facility] [place] [phone] [place]    8/3/2017 8:30 AM EWA [place] [facility] [place] [phone] [place]    8/18/2017 9:00 AM EWA [place] [facility] [place] [phone] [place]    8/31/2017 8:30 AM EWA [place] [facility] [place] [phone] [facility]      24 Hour Appointment Hotline       To make an appointment at any [facility] call [phone] ([phone]). If you don't have a family doctor or clinic, we will help you find one. [facility] are conveniently located to serve the needs of you and your family.             Review of your medicines      START taking        Dose / Directions Last dose taken     NIFEdipine ER osmotic 30 MG 24 hr tablet   Commonly known as:  PROCARDIA XL   Dose:  30 mg   Quantity:  14 tablet        Take 1 tablet (30 mg) by mouth daily   Refills:  0          Our records show that you are taking the medicines listed below. If these are incorrect, please call your family doctor or clinic.        Dose / Directions Last dose taken    acetaminophen 325 MG tablet   Commonly known as:  TYLENOL   Dose:  650 mg   Quantity:  60 tablet        Take 2 tablets (650 mg) by mouth every 6 hours as needed for mild pain or fever   Refills:  3        amoxicillin 875 MG tablet   Commonly known as:  AMOXIL   Dose:  875 mg   Indication:  Urinary Tract Infection   Quantity:  8 tablet        Take 1 tablet (875 mg) by mouth every 12 hours for 4 days   Refills:  0        amoxicillin-clavulanate 875-125 MG per tablet   Commonly known as:  AUGMENTIN   Dose:  1 tablet   Quantity:  10 tablet        Take 1 tablet by mouth 2 times daily for 5 days   Refills:  0        ibuprofen 600 MG tablet   Commonly known as:  ADVIL/MOTRIN   Dose:  600 mg   Quantity:  60 tablet        Take 1 tablet (600 mg) by mouth every 6 hours   Refills:  0        * order for DME   Quantity:  1 Device        Compression stockings 20-30mmHg   Refills:  0        * order for DME   Quantity:  1 Device        Pregnancy support belt for pelvic and back pain in pregnancy Wear daily when awake and active dispense #1, no refills   Refills:  0        oxyCODONE-acetaminophen 5-325 MG per tablet   Commonly known as:  PERCOCET   Dose:  1-2 tablet   Quantity:  45 tablet        Take 1-2 tablets by mouth every 4 hours as needed for pain   Refills:  0        PRENATAL VITAMIN PO        Take by mouth daily   Refills:  0        senna-docusate 8.6-50 MG per tablet   Commonly known as:  SENOKOT-S;PERICOLACE   Dose:  1-2 tablet   Quantity:  60 tablet        Take 1-2 tablets by mouth 2 times daily   Refills:  0        * Notice:  This list has 2 medication(s) that are the same  "as other medications prescribed for you. Read the directions carefully, and ask your doctor or other care provider to review them with you.            Prescriptions were sent or printed at these locations (1 Prescription)                   Other Prescriptions                Printed at Department/Unit printer (1 of 1)         NIFEdipine ER osmotic (PROCARDIA XL) 30 MG 24 hr tablet                Procedures and tests performed during your visit     CBC with platelets differential    Comprehensive metabolic panel    UA with Microscopic    Uric acid      Orders Needing Specimen Collection     None      Pending Results     No orders found from 6/17/2017 to 6/20/2017.            Pending Culture Results     No orders found from 6/17/2017 to 6/20/2017.            Pending Results Instructions     If you had any lab results that were not finalized at the time of your Discharge, you can call the ED Lab Result RN at [phone]. You will be contacted by this team for any positive Lab results or changes in treatment. The nurses are available 7 days a week from 10A to 6:30P.  You can leave a message 24 hours per day and they will return your call.        Thank you for choosing [facility]       Thank you for choosing [facility] for your care. Our goal is always to provide you with excellent care. Hearing back from our patients is one way we can continue to improve our services. Please take a few minutes to complete the written survey that you may receive in the mail after you visit with us. Thank you!        [email or web address] Information     [employer] lets you send messages to your doctor, view your test results, renew your prescriptions, schedule appointments and more. To sign up, go to [email or web address] . Click on \"Log in\" on the left side of the screen, which will take you to the Welcome page. Then click on \"Sign up Now\" on the right side of the page.     You will be asked to enter the access code listed below, as well as some personal " information. Please follow the directions to create your username and password.     Your access code is: [email or web address]  Expires: 2017 10:28 AM     Your access code will  in 90 days. If you need help or a new code, please call your [facility] clinic or [phone].        Care EveryWhere ID     This is your Care EveryWhere ID. This could be used by other organizations to access your [facility] medical records  [id number]        After Visit Summary       This is your record. Keep this with you and show to your community pharmacist(s) and doctor(s) at your next visit.

## 2017-06-19 NOTE — ED AVS SNAPSHOT
[facility], [place], Emergency Department    [address]    [place] [address]    Phone:  [phone]    Fax:  [phone]                                       Nhi Alexander   MRN: [id number]    Department:  [facility], Emergency Department   Date of Visit:  6/19/2017           After Visit Summary Signature Page     I have received my discharge instructions, and my questions have been answered. I have discussed any challenges I see with this plan with the nurse or doctor.    ..........................................................................................................................................  Patient/Patient Representative Signature      ..........................................................................................................................................  Patient Representative Print Name and Relationship to Patient    ..................................................               ................................................  Date                                            Time    ..........................................................................................................................................  Reviewed by Signature/Title    ...................................................              ..............................................  Date                                                            Time

## 2017-06-19 NOTE — ED PROVIDER NOTES
History     Chief Complaint   Patient presents with     Pre-Eclampsia     Pt had emergency  on  d/t pre-eclampsia. D/C'd but reports severe headache onset this afternoon, checked BP at pharmacy & it was 172/105.     HPI  Nhi Alexander is a 32 year old female who recently had an emergency  on 17 (5 days ago) 24 weeks into her gestation. Patient states she was not discharged home with blood pressure medications. She states this morning she woke up with a headache and checked her blood pressure which was 175/115. She states she rechecked her blood pressure at [facility] where it was 172/105. She states this was her first pregnancy. She also has complaints of abdominal pain and edema in her legs which has been improving slowly. She states she is currently taking Advil and oxycodone. In the hospital she required IV labetol. Her bleeding is slowly improving. The c section pain is slowly improving. No fevers or chills. No nausea or vomiting.    PAST MEDICAL HISTORY  Past Medical History:   Diagnosis Date     Abdominal pain, unspecified abdominal location 2009     Problem list name updated by automated process. Provider to review     CARDIOVASCULAR SCREENING; LDL GOAL LESS THAN 160 10/31/2010     Chest pain 3/11/2015    feels like chest wall pain,       Dysmenorrhea      Hypertension      Infertility, female      Papanicolaou smear of vagina with low grade squamous intraepithelial lesion (LGSIL) 2008     PAST SURGICAL HISTORY  Past Surgical History:   Procedure Laterality Date      SECTION N/A 2017    Procedure:  SECTION;;  Surgeon: Shirley Alfredo MD;  Location: [facility]     DAVINCI LYSIS OF ADHESIONS N/A 2015    Procedure: DAVINCI LYSIS OF ADHESIONS;  Surgeon: Kimberly Suarez MD;  Location: [facility] OR     DAVINCI PELVIC PROCEDURE N/A 2015    Procedure: DAVINCI XI PELVIC PROCEDURE;  Surgeon: Kimberly Suarez MD;  Location: [facility] OR     DILATION AND  CURETTAGE, OPERATIVE HYSTEROSCOPY WITH MORCELLATOR, COMBINED N/A 11/5/2015    Procedure: COMBINED DILATION AND CURETTAGE, OPERATIVE HYSTEROSCOPY WITH MORCELLATOR;  Surgeon: Kimberly Suarez MD;  Location:  OR     LAPAROSCOPIC TUBAL DYE STUDY Bilateral 11/5/2015    Procedure: LAPAROSCOPIC TUBAL DYE STUDY;  Surgeon: Kimberly Suarez MD;  Location:  OR     LAPAROSCOPY DIAGNOSTIC (GYN) N/A 11/5/2015    Procedure: LAPAROSCOPY DIAGNOSTIC (GYN);  Surgeon: Kimberly Suarez MD;  Location:  OR     FAMILY HISTORY  Family History   Problem Relation Age of Onset     Gynecology Maternal Grandmother      endometriosis, hyst done age29's (approx)     Gynecology Maternal Aunt      endometriosis, hyst done age 30's (approx)     Family History Negative Mother      Family History Negative Father      Coronary Artery Disease No family hx of      Hypertension No family hx of      DIABETES No family hx of      CEREBROVASCULAR DISEASE No family hx of      SOCIAL HISTORY  Social History   Substance Use Topics     Smoking status: Never Smoker     Smokeless tobacco: Never Used     Alcohol use No     MEDICATIONS  No current facility-administered medications for this encounter.      Current Outpatient Prescriptions   Medication     Misc. Devices (BREAST PUMP) MISC     NIFEdipine ER osmotic (PROCARDIA XL) 30 MG 24 hr tablet     amoxicillin-clavulanate (AUGMENTIN) 875-125 MG per tablet     acetaminophen (TYLENOL) 325 MG tablet     ibuprofen (ADVIL/MOTRIN) 600 MG tablet     senna-docusate (SENOKOT-S;PERICOLACE) 8.6-50 MG per tablet     oxyCODONE-acetaminophen (PERCOCET) 5-325 MG per tablet     Prenatal Vit-Fe Fumarate-FA (PRENATAL VITAMIN PO)     ALLERGIES  No Known Allergies    I have reviewed the Medications, Allergies, Past Medical and Surgical History, and Social History in the Epic system.    Review of Systems   Constitutional: Negative for chills and fever.   HENT: Positive for ear discharge. Negative for congestion.    Respiratory:  Negative for cough and shortness of breath.    Cardiovascular: Positive for leg swelling (improving).   Gastrointestinal: Positive for abdominal pain (from the csection). Negative for nausea and vomiting.   Genitourinary: Negative for dysuria and flank pain.   Musculoskeletal: Negative for joint swelling and myalgias.   Skin: Negative.    Allergic/Immunologic: Negative for immunocompromised state.   Neurological: Positive for headaches (chronic headaches).   Hematological: Negative.    All other systems reviewed and are negative.      Physical Exam   BP: (!) 166/97  Pulse: 77  Temp: 98.4  F (36.9  C)  Resp: 16  Weight: 111.4 kg (245 lb 8 oz)  SpO2: 95 %  Physical Exam   Constitutional: She is oriented to person, place, and time. She appears well-developed and well-nourished. No distress.   HENT:   Head: Normocephalic and atraumatic.   Eyes: Pupils are equal, round, and reactive to light.   Neck: Neck supple.   Cardiovascular: Normal rate, normal heart sounds and intact distal pulses.    No murmur heard.  Pulmonary/Chest: Breath sounds normal. She has no wheezes. She has no rales.   Abdominal: Soft. There is tenderness.   Musculoskeletal: She exhibits edema and tenderness.   Neurological: She is alert and oriented to person, place, and time. She has normal reflexes.   Skin: Skin is warm and dry. She is not diaphoretic.   Nursing note and vitals reviewed.      ED Course     ED Course     Procedures   6:40 PM  The patient was seen and examined by Dr. Bates in Room 18.           Repeated bp's show moderation of blood pressure to the 145/95 area. OB Gyn here consulting.  Labs show CBC appropriate with minimally low hgb.  Platelets  are normal.   Chemistries normal except for mild uric acid elevation.         Labs Ordered and Resulted from Time of ED Arrival Up to the Time of Departure from the ED   ROUTINE UA WITH MICROSCOPIC - Abnormal; Notable for the following:        Result Value    Glucose Urine Unable to assay due  to interfering substance (*)     Bilirubin Urine Unable to assay due to interfering substance (*)     Ketones Urine Unable to assay due to interfering substance (*)     Blood Urine Unable to assay due to interfering substance (*)     Protein Albumin Urine Unable to assay due to interfering substance (*)     Nitrite Urine Unable to assay due to interfering substance (*)     Leukocyte Esterase Urine Unable to assay due to interfering substance (*)     Bacteria Urine Few (*)     Squamous Epithelial /HPF Urine 2 (*)     All other components within normal limits   CBC WITH PLATELETS DIFFERENTIAL - Abnormal; Notable for the following:     RBC Count 3.46 (*)     Hemoglobin 10.6 (*)     Hematocrit 32.3 (*)     All other components within normal limits   COMPREHENSIVE METABOLIC PANEL - Abnormal; Notable for the following:     Albumin 2.5 (*)     Protein Total 6.3 (*)     All other components within normal limits   URIC ACID - Abnormal; Notable for the following:     Uric Acid 6.3 (*)     All other components within normal limits            Assessments & Plan (with Medical Decision Making)   Hypertension likely preeclampsia in a delivered patient. The pregnancy was 24 weeks  Ob gyn saw pt and aware of labs. See consult.  Discharge home with procardia xl with follow up tomorrow.    The pt got percocet with improvement in headache and the abd pain    I have reviewed the nursing notes.    I have reviewed the findings, diagnosis, plan and need for follow up with the patient.    Discharge Medication List as of 6/19/2017  9:57 PM      START taking these medications    Details   NIFEdipine ER osmotic (PROCARDIA XL) 30 MG 24 hr tablet Take 1 tablet (30 mg) by mouth daily, Disp-14 tablet, R-0, Local Print             Final diagnoses:   Preeclampsia in postpartum period     I, Sixto Colmenares, am serving as a trained medical scribe to document services personally performed by Steve Bates MD, based on the provider's statements to me.       ISteve MD, was physically present and have reviewed and verified the accuracy of this note documented by Sixto Colmenares.     6/19/2017   [facility], [place], EMERGENCY DEPARTMENT     Steve Bates MD  06/24/17 4747

## 2017-06-20 ENCOUNTER — NURSE TRIAGE (OUTPATIENT)
Dept: NURSING | Facility: CLINIC | Age: 32
End: 2017-06-20

## 2017-06-20 ENCOUNTER — TELEPHONE (OUTPATIENT)
Dept: OBGYN | Facility: CLINIC | Age: 32
End: 2017-06-20

## 2017-06-20 ENCOUNTER — OFFICE VISIT (OUTPATIENT)
Dept: MIDWIFE SERVICES | Facility: CLINIC | Age: 32
End: 2017-06-20
Payer: COMMERCIAL

## 2017-06-20 DIAGNOSIS — O14.90 PREECLAMPSIA, UNSPECIFIED TRIMESTER: ICD-10-CM

## 2017-06-20 DIAGNOSIS — O14.13 SEVERE PREECLAMPSIA, THIRD TRIMESTER: Primary | ICD-10-CM

## 2017-06-20 DIAGNOSIS — O09.02 ENCOUNTER FOR SUPERVISION OF HIGH-RISK PREGNANCY WITH HISTORY OF INFERTILITY IN SECOND TRIMESTER: ICD-10-CM

## 2017-06-20 DIAGNOSIS — E55.9 VITAMIN D INSUFFICIENCY: ICD-10-CM

## 2017-06-20 LAB
ALT SERPL W P-5'-P-CCNC: 25 U/L (ref 0–50)
AST SERPL W P-5'-P-CCNC: 23 U/L (ref 0–45)
BACTERIA SPEC CULT: ABNORMAL
BASOPHILS # BLD AUTO: 0 10E9/L (ref 0–0.2)
BASOPHILS NFR BLD AUTO: 0.4 %
CREAT UR-MCNC: 33 MG/DL
DIFFERENTIAL METHOD BLD: ABNORMAL
EOSINOPHIL # BLD AUTO: 0.4 10E9/L (ref 0–0.7)
EOSINOPHIL NFR BLD AUTO: 3.6 %
ERYTHROCYTE [DISTWIDTH] IN BLOOD BY AUTOMATED COUNT: 13.1 % (ref 10–15)
HCT VFR BLD AUTO: 34.6 % (ref 35–47)
HGB BLD-MCNC: 11.4 G/DL (ref 11.7–15.7)
LYMPHOCYTES # BLD AUTO: 1.7 10E9/L (ref 0.8–5.3)
LYMPHOCYTES NFR BLD AUTO: 17.1 %
MCH RBC QN AUTO: 31 PG (ref 26.5–33)
MCHC RBC AUTO-ENTMCNC: 32.9 G/DL (ref 31.5–36.5)
MCV RBC AUTO: 94 FL (ref 78–100)
MICRO REPORT STATUS: ABNORMAL
MICROORGANISM SPEC CULT: ABNORMAL
MONOCYTES # BLD AUTO: 0.8 10E9/L (ref 0–1.3)
MONOCYTES NFR BLD AUTO: 7.7 %
NEUTROPHILS # BLD AUTO: 7 10E9/L (ref 1.6–8.3)
NEUTROPHILS NFR BLD AUTO: 71.2 %
PLATELET # BLD AUTO: 351 10E9/L (ref 150–450)
PROT UR-MCNC: 0.07 G/L
PROT/CREAT 24H UR: 0.2 G/G CR (ref 0–0.2)
RBC # BLD AUTO: 3.68 10E12/L (ref 3.8–5.2)
SPECIMEN SOURCE: ABNORMAL
URATE SERPL-MCNC: 6.8 MG/DL (ref 2.6–6)
WBC # BLD AUTO: 9.8 10E9/L (ref 4–11)

## 2017-06-20 PROCEDURE — 84156 ASSAY OF PROTEIN URINE: CPT | Performed by: NURSE PRACTITIONER

## 2017-06-20 PROCEDURE — 84450 TRANSFERASE (AST) (SGOT): CPT | Performed by: NURSE PRACTITIONER

## 2017-06-20 PROCEDURE — 99213 OFFICE O/P EST LOW 20 MIN: CPT | Performed by: NURSE PRACTITIONER

## 2017-06-20 PROCEDURE — 36415 COLL VENOUS BLD VENIPUNCTURE: CPT | Performed by: NURSE PRACTITIONER

## 2017-06-20 PROCEDURE — 84550 ASSAY OF BLOOD/URIC ACID: CPT | Performed by: NURSE PRACTITIONER

## 2017-06-20 PROCEDURE — 84460 ALANINE AMINO (ALT) (SGPT): CPT | Performed by: NURSE PRACTITIONER

## 2017-06-20 PROCEDURE — 85025 COMPLETE CBC W/AUTO DIFF WBC: CPT | Performed by: NURSE PRACTITIONER

## 2017-06-20 RX ORDER — NIFEDIPINE 30 MG/1
60 TABLET, EXTENDED RELEASE ORAL DAILY
Qty: 14 TABLET | Refills: 0 | Status: SHIPPED | OUTPATIENT
Start: 2017-06-20 | End: 2017-06-30

## 2017-06-20 NOTE — CONSULTS
[facility]  OB Consult      Nhi Alexander MRN# [id number]   Age: 32 year old YOB: 1985     CC:  Elevated blood pressures    HPI:  Ms. Nhi Alexander is a 32 year old  POD#5 s/p PCCS at 24w6d for pre eclampsia with severe features not responding to IV anti hypertensive medications. Her pregnancy was complicated by pre eclampsia with severe features, admitted at 24w1d, asymmetric IUGR with AC <2%ile, and abnormal umbilical artery dopplers. She was discharged one day ago (17). Her postpartum course was notable for 24 hours of postpartum magnesium and excellent blood pressure control with no anti hypertensive medications utilized in the postpartum period inpatient. She has been doing well since discharge but took her blood pressure upon waking up today. The initial blood pressure at home was 170s, repeat was 160s. She has a mild headache, much less than her headaches she had inpatient, she states. She has not required a headache medication and is only using roxicodone and tylenol for her abdominal pain/incisional pain. No SOB, CP, worsening edema, vision changes. Voiding without issues. Passing flatus and having BM's.    Ob Hx:  Obstetric History       T0      L1     SAB0   TAB0   Ectopic0   Multiple0   Live Births1       # Outcome Date GA Lbr Holden/2nd Weight Sex Delivery Anes PTL Lv   1  17 24w6d  0.46 kg (1 lb 0.2 oz) F CS-Classical   PEDRO      Name: ALLYSON ALEXANDER      Apgar1:  7                Apgar5: 8        Prenatal Labs:   Lab Results   Component Value Date    ABO A 2017    RH  Pos 2017    AS Neg 2017    HEPBANG Nonreactive 2017    CHPCRT  2017     Negative   Negative for C. trachomatis rRNA by transcription mediated amplification.   A negative result by transcription mediated amplification does not preclude the   presence of C. trachomatis infection because results are dependent on  proper   and adequate collection, absence of inhibitors, and sufficient rRNA to be   detected.      GCPCRT  2017     Negative   Negative for N. gonorrhoeae rRNA by transcription mediated amplification.   A negative result by transcription mediated amplification does not preclude the   presence of N. gonorrhoeae infection because results are dependent on proper   and adequate collection, absence of inhibitors, and sufficient rRNA to be   detected.      TREPAB Negative 2017    HGB 10.6 (L) 2017         OB History  Obstetric History       T0      L1     SAB0   TAB0   Ectopic0   Multiple0   Live Births1       # Outcome Date GA Lbr Holden/2nd Weight Sex Delivery Anes PTL Lv   1  17 24w6d  0.46 kg (1 lb 0.2 oz) F CS-Classical   PEDRO      Name: ALLYSON CUTLER      Apgar1:  7                Apgar5: 8          PMHx:   Past Medical History:   Diagnosis Date     Abdominal pain, unspecified abdominal location 2009     Problem list name updated by automated process. Provider to review     CARDIOVASCULAR SCREENING; LDL GOAL LESS THAN 160 10/31/2010     Chest pain 3/11/2015    feels like chest wall pain,       Dysmenorrhea      Hypertension      Infertility, female      Papanicolaou smear of vagina with low grade squamous intraepithelial lesion (LGSIL) 2008     PSHx:   Past Surgical History:   Procedure Laterality Date      SECTION N/A 2017    Procedure:  SECTION;;  Surgeon: Shirley Alfredo MD;  [facility]: [facility]     DAVINCI LYSIS OF ADHESIONS N/A 2015    Procedure: DAVINCI LYSIS OF ADHESIONS;  Surgeon: Kimberly Suarez MD;  Location:  OR     DAVINCI PELVIC PROCEDURE N/A 2015    Procedure: DAVINCI XI PELVIC PROCEDURE;  Surgeon: Kimberly Suarez MD;  Location:  OR     DILATION AND CURETTAGE, OPERATIVE HYSTEROSCOPY WITH MORCELLATOR, COMBINED N/A 2015    Procedure: COMBINED DILATION AND CURETTAGE, OPERATIVE HYSTEROSCOPY WITH  MORCELLATOR;  Surgeon: Kimberly Suarez MD;  Location:  OR     LAPAROSCOPIC TUBAL DYE STUDY Bilateral 11/5/2015    Procedure: LAPAROSCOPIC TUBAL DYE STUDY;  Surgeon: Kimberly Suarez MD;  Location:  OR     LAPAROSCOPY DIAGNOSTIC (GYN) N/A 11/5/2015    Procedure: LAPAROSCOPY DIAGNOSTIC (GYN);  Surgeon: Kimberly Suarez MD;  Location:  OR     Meds:   No current facility-administered medications for this encounter.      Current Outpatient Prescriptions   Medication     amoxicillin-clavulanate (AUGMENTIN) 875-125 MG per tablet     acetaminophen (TYLENOL) 325 MG tablet     amoxicillin (AMOXIL) 875 MG tablet     ibuprofen (ADVIL/MOTRIN) 600 MG tablet     senna-docusate (SENOKOT-S;PERICOLACE) 8.6-50 MG per tablet     oxyCODONE-acetaminophen (PERCOCET) 5-325 MG per tablet     order for DME     order for DME     Prenatal Vit-Fe Fumarate-FA (PRENATAL VITAMIN PO)     Allergies:  No Known Allergies   FmHx:   Family History   Problem Relation Age of Onset     Gynecology Maternal Grandmother      endometriosis, hyst done age31's (approx)     Gynecology Maternal Aunt      endometriosis, hyst done age 30's (approx)     Family History Negative Mother      Family History Negative Father      Coronary Artery Disease No family hx of      Hypertension No family hx of      DIABETES No family hx of      CEREBROVASCULAR DISEASE No family hx of      SocHx: She denies any tobacco, alcohol, or other drug use during this pregnancy.    ROS:   Complete 10-point ROS negative except as noted in HPI. She denies headache, blurry vision, chest pain, shortness of breath, RUQ pain, nausea, vomiting, dysuria, hematuria or extremity edema.    PE:  Vit:   Vitals:    06/19/17 1847 06/19/17 1850 06/19/17 2103 06/19/17 2203   BP: 138/83 (!) 147/92 (!) 145/94 (!) 144/95   Pulse:    62   Resp:    16   Temp:    98  F (36.7  C)   TempSrc:       SpO2:       Weight:        Initial BP upon presentaition to /101    Gen: Well-appearing, NAD, comfortable    CV: rrr, no mrg   Pulm: Ctab, no wheezes or crackles   Abd: Soft, gravid, non-tender, +BS. Incision c/d/i with steri strips  Ext: 2+ LE edema b/l, 1+ DTR's patella, no beats clonus    Results for orders placed or performed during the hospital encounter of 06/19/17   UA with Microscopic   Result Value Ref Range    Color Urine Orange     Appearance Urine Clear     Glucose Urine Unable to assay due to interfering substance (A) NEG mg/dL    Bilirubin Urine Unable to assay due to interfering substance (A) NEG    Ketones Urine Unable to assay due to interfering substance (A) NEG mg/dL    Specific Gravity Urine 1.006 1.003 - 1.035    Blood Urine Unable to assay due to interfering substance (A) NEG    pH Urine Unable to assay due to interfering substance 5.0 - 7.0 pH    Protein Albumin Urine Unable to assay due to interfering substance (A) NEG mg/dL    Urobilinogen mg/dL Unable to assay due to interfering substance 0.0 - 2.0 mg/dL    Nitrite Urine Unable to assay due to interfering substance (A) NEG    Leukocyte Esterase Urine Unable to assay due to interfering substance (A) NEG    Source Midstream Urine     WBC Urine <1 0 - 2 /HPF    RBC Urine 1 0 - 2 /HPF    Bacteria Urine Few (A) NEG /HPF    Squamous Epithelial /HPF Urine 2 (H) 0 - 1 /HPF   CBC with platelets differential   Result Value Ref Range    WBC 8.3 4.0 - 11.0 10e9/L    RBC Count 3.46 (L) 3.8 - 5.2 10e12/L    Hemoglobin 10.6 (L) 11.7 - 15.7 g/dL    Hematocrit 32.3 (L) 35.0 - 47.0 %    MCV 93 78 - 100 fl    MCH 30.6 26.5 - 33.0 pg    MCHC 32.8 31.5 - 36.5 g/dL    RDW 13.4 10.0 - 15.0 %    Platelet Count 273 150 - 450 10e9/L    Diff Method Automated Method     % Neutrophils 61.8 %    % Lymphocytes 23.8 %    % Monocytes 9.8 %    % Eosinophils 3.7 %    % Basophils 0.4 %    % Immature Granulocytes 0.5 %    Nucleated RBCs 0 0 /100    Absolute Neutrophil 5.1 1.6 - 8.3 10e9/L    Absolute Lymphocytes 2.0 0.8 - 5.3 10e9/L    Absolute Monocytes 0.8 0.0 - 1.3 10e9/L     Absolute Eosinophils 0.3 0.0 - 0.7 10e9/L    Absolute Basophils 0.0 0.0 - 0.2 10e9/L    Abs Immature Granulocytes 0.0 0 - 0.4 10e9/L    Absolute Nucleated RBC 0.0    Comprehensive metabolic panel   Result Value Ref Range    Sodium 142 133 - 144 mmol/L    Potassium 4.4 3.4 - 5.3 mmol/L    Chloride 109 94 - 109 mmol/L    Carbon Dioxide 24 20 - 32 mmol/L    Anion Gap 9 3 - 14 mmol/L    Glucose 84 70 - 99 mg/dL    Urea Nitrogen 19 7 - 30 mg/dL    Creatinine 0.93 0.52 - 1.04 mg/dL    GFR Estimate 69 >60 mL/min/1.7m2    GFR Estimate If Black 83 >60 mL/min/1.7m2    Calcium 8.6 8.5 - 10.1 mg/dL    Bilirubin Total 0.2 0.2 - 1.3 mg/dL    Albumin 2.5 (L) 3.4 - 5.0 g/dL    Protein Total 6.3 (L) 6.8 - 8.8 g/dL    Alkaline Phosphatase 61 40 - 150 U/L    ALT 24 0 - 50 U/L    AST 18 0 - 45 U/L   Uric acid   Result Value Ref Range    Uric Acid 6.3 (H) 2.6 - 6.0 mg/dL         Assessment and Plan  Nhi Alexander is a 32 year old  POD#5 s/p PCCS for pre eclampsia with severe features. She was discharged yesterday, s/p 24h magnesium in the postpartum period. She is now presenting with ongoing intermittent severe range blood pressures and no other signs or symptoms of worsening pre eclampsia with severe features. Reassured by mild range BP's with serial monitoring in the ED, overall normal HELLP labs, and no severe symptoms. Notably, Cr slightly elevated from 0.7>0.9 today. Today we discussed treatment including possible admission with serial blood pressure monitoring and repeat IV magnesium infusion. The patient strongly does not desire admission and is highly compliant. Given the above normal and reassuring findings, we will recommend outpatient blood pressure monitoring with repeat HELLP labs and blood pressure check tomorrow 17 in clinic. The patient will call to schedule, and may f/u with an OB nearer to her home as she only visits [facility] after normal clinic hours. We recommend initiating Procardia 30 mg XL daily,  first dose in ED today. We discussed optimal blood pressures 150/100 or under, and the patient will call triage line with 160/110 or any severe symptoms just as she did today.    The patient was discussed with Dr. West who is in agreement with the treatment plan.  Patient understands and agrees with plan of care, all questions answered.     Rosie Purdy MD  OB-GYN PGY-3    The patient was reviewed with Dr. Purdy.  I agree with the above assessment and plan.    Sylvia West MD, FACOG

## 2017-06-20 NOTE — TELEPHONE ENCOUNTER
Patient seen today by CNM clinic at [facility] after ER visit last night with elevated blood pressure and headache.  Labs then were normal, except elevated uric acid.  She returns to clinic today, /11, 150/92 and 140/82 (20 points lower than her home BP cuff), labs repeated and normal (except uric acid).  Was started on procardia 30mg XL, plan to increase to 60mg XL daily and return to our clinic tomorrow for BP check.  Patient and CNM agree with plan.    Meghan Malcolm MD

## 2017-06-20 NOTE — TELEPHONE ENCOUNTER
Spoke with patient to get her appt. Tomorrow and she states she also needs incision check. Able to add on to Dr. Hutchins's schedule. Patient agreeable to appt.

## 2017-06-20 NOTE — TELEPHONE ENCOUNTER
Left message for RN to call me back regarding follow up for Nhi.  She is having increased BP with headache.  Should Procardia be increased?  How should she be monitoring her BP?    Celeste MCNEIL CNM

## 2017-06-20 NOTE — DISCHARGE INSTRUCTIONS
Begin procardia xl tomorrow at supper- you got your dose in the ed for today  You need to be rechecked at your ob/gyn clinic tomorrow or your primary care clinic- the blood pressure needs to be rechecked and maybe labs repeated  Get a new blood pressure cuff tomorrow - check in the morning and once at night. Your cuff from home is not correlating well with our monitors

## 2017-06-20 NOTE — TELEPHONE ENCOUNTER
Spoke to Meghan ARANGO.  Meghan states to page Dr. Meghan Malcolm at [phone] (MD on call), but if patient is still having headaches, she should report to [facility] ER.  Dr. Malcolm returned page at [phone].  Spoke to Dr. Malcolm about increased BPs at home and visit to ER last night.  Notified of BPs in clinic today 140/84, 150/92 with continued dull LAMA.  Dr. Malcolm states that have patient take another 30mg dose of Procardia XL now, and increase daily dosage to 60mg PO q day.  Dr would also like patient to go to her clinic for visit tomorrow.     Message relayed to Nhi.  Patient verbalized understaning to take another 30mg Procardia now and to start 60mg daily dosage tonight.  Patient will call Dr. Malcolm's office for appointment today.  Counseled on danger signs, when to seek ER treatment.  Counseled to go to [facility] ER if headache gets any worse or if BP >160/110 at home.  Patient verbalized understanding.    Celeste MCNEIL CNM

## 2017-06-20 NOTE — PROGRESS NOTES
"  SUBJECTIVE:                                                   Nhi Alexander is a 32 year old who presents to clinic today for the following health issue(s):  Patient presents with:  Prenatal Care      HPI:  Patient states that she woke up last night with severe HA.  She took her BP at home and was found to be 180/100.  Patient went to ER, had work up done, and was started on Procardia.  Patient states that she still has \"mild\"headache, but states that it is much better. She states that her BP at home was 170/110 this am and would like us to check her automatic BP machine; would like us to compare BPs taken manually vs auto cuff.    Patient's last menstrual period was 2016.  Menstrual History: lochia s/p   Patient is not sexually active  .  Using none for contraception.   Health maintenance updated:  yes  STI infx testing offered:  Declined    Last PHQ-9 score on record =   PHQ-9 SCORE 2017   Total Score 4     Last GAD7 score on record =   PATRICA-7 SCORE 2017   Total Score 2     Problem list and histories reviewed & adjusted, as indicated.  Additional history: as documented.    Patient Active Problem List   Diagnosis     Encounter for supervision of high-risk pregnancy with history of infertility in second trimester     Vitamin D insufficiency     Infertility, female     Abnormal cervical Papanicolaou smear     Obesity complicating pregnancy, second trimester     Indication for care in labor or delivery     Preeclampsia     S/P  section     Past Surgical History:   Procedure Laterality Date      SECTION N/A 2017    Procedure:  SECTION;;  Surgeon: Shirley Alfredo MD;  Location: [facility]     DAVINCI LYSIS OF ADHESIONS N/A 2015    Procedure: DAVINCI LYSIS OF ADHESIONS;  Surgeon: Kimberly Suarez MD;  Location:  OR     DAVINCI PELVIC PROCEDURE N/A 2015    Procedure: DAVINCI XI PELVIC PROCEDURE;  Surgeon: Kimberly Suarez MD;  Location:  OR     " DILATION AND CURETTAGE, OPERATIVE HYSTEROSCOPY WITH MORCELLATOR, COMBINED N/A 11/5/2015    Procedure: COMBINED DILATION AND CURETTAGE, OPERATIVE HYSTEROSCOPY WITH MORCELLATOR;  Surgeon: Kimberly Suarez MD;  Location:  OR     LAPAROSCOPIC TUBAL DYE STUDY Bilateral 11/5/2015    Procedure: LAPAROSCOPIC TUBAL DYE STUDY;  Surgeon: Kimberly Suarez MD;  Location:  OR     LAPAROSCOPY DIAGNOSTIC (GYN) N/A 11/5/2015    Procedure: LAPAROSCOPY DIAGNOSTIC (GYN);  Surgeon: Kimberly Suarez MD;  Location:  OR      Social History   Substance Use Topics     Smoking status: Never Smoker     Smokeless tobacco: Never Used     Alcohol use No      Problem (# of Occurrences) Relation (Name,Age of Onset)    Family History Negative (2) Mother, Father    Gynecology (2) Maternal Grandmother: endometriosis, hyst done age29's (approx), Maternal Aunt: endometriosis, hyst done age 30's (approx)       Negative family history of: Coronary Artery Disease, Hypertension, DIABETES, CEREBROVASCULAR DISEASE            Current Outpatient Prescriptions   Medication Sig     NIFEdipine ER osmotic (PROCARDIA XL) 30 MG 24 hr tablet Take 2 tablets (60 mg) by mouth daily     amoxicillin-clavulanate (AUGMENTIN) 875-125 MG per tablet Take 1 tablet by mouth 2 times daily for 5 days     ibuprofen (ADVIL/MOTRIN) 600 MG tablet Take 1 tablet (600 mg) by mouth every 6 hours     senna-docusate (SENOKOT-S;PERICOLACE) 8.6-50 MG per tablet Take 1-2 tablets by mouth 2 times daily     oxyCODONE-acetaminophen (PERCOCET) 5-325 MG per tablet Take 1-2 tablets by mouth every 4 hours as needed for pain     Prenatal Vit-Fe Fumarate-FA (PRENATAL VITAMIN PO) Take by mouth daily     [DISCONTINUED] NIFEdipine ER osmotic (PROCARDIA XL) 30 MG 24 hr tablet Take 1 tablet (30 mg) by mouth daily     acetaminophen (TYLENOL) 325 MG tablet Take 2 tablets (650 mg) by mouth every 6 hours as needed for mild pain or fever (Patient not taking: Reported on 6/20/2017)     No current  facility-administered medications for this visit.      No Known Allergies    ROS:  12 point review of systems negative other than symptoms noted below.  Constitutional: Fatigue  Neurologic: Headaches    OBJECTIVE:     LMP 12/22/2016  There is no height or weight on file to calculate BMI.    PHYSICAL EXAM:  Constitutional:  Appearance: Well nourished, well developed alert, in no acute distress  Neurologic/Psychiatric:  Mental Status:  Oriented X3      In-Clinic Test Results:  Results for orders placed or performed in visit on 06/20/17 (from the past 24 hour(s))   CBC with platelets differential   Result Value Ref Range    WBC 9.8 4.0 - 11.0 10e9/L    RBC Count 3.68 (L) 3.8 - 5.2 10e12/L    Hemoglobin 11.4 (L) 11.7 - 15.7 g/dL    Hematocrit 34.6 (L) 35.0 - 47.0 %    MCV 94 78 - 100 fl    MCH 31.0 26.5 - 33.0 pg    MCHC 32.9 31.5 - 36.5 g/dL    RDW 13.1 10.0 - 15.0 %    Platelet Count 351 150 - 450 10e9/L    Diff Method Automated Method     % Neutrophils 71.2 %    % Lymphocytes 17.1 %    % Monocytes 7.7 %    % Eosinophils 3.6 %    % Basophils 0.4 %    Absolute Neutrophil 7.0 1.6 - 8.3 10e9/L    Absolute Lymphocytes 1.7 0.8 - 5.3 10e9/L    Absolute Monocytes 0.8 0.0 - 1.3 10e9/L    Absolute Eosinophils 0.4 0.0 - 0.7 10e9/L    Absolute Basophils 0.0 0.0 - 0.2 10e9/L       ASSESSMENT/PLAN:                                                        ICD-10-CM    1. Severe preeclampsia, third trimester O14.13 CBC with platelets differential     AST     ALT     Uric acid     Creatinine random urine     Protein  random urine   2. Vitamin D insufficiency E55.9    3. Encounter for supervision of high-risk pregnancy with history of infertility in second trimester O09.02    4. Preeclampsia, unspecified trimester O14.90 NIFEdipine ER osmotic (PROCARDIA XL) 30 MG 24 hr tablet       COUNSELING:    Preeclampsia labs done  Counseled on danger signs, s/s pre-e, when to see ER treatment  Patient BP cuff 20pts higher than manual BP; manual BP  150/92, 140/84  Continue to monitor s/s pre-eclampsia, go to ER if any increase in HA or BP  Plan to see Dr. Malcolm in office tomorrow to monitor BPs  return to clinic PRN        Celeste MCNEIL CNM

## 2017-06-20 NOTE — PATIENT INSTRUCTIONS
"PREECLAMPSIA SIGNS AND SYMPTOMS    Preeclampsia is a dangerous condition that some women develop in the second half of pregnancy. It can also begin after the baby is born.  Preeclampsia causes high blood pressure and can cause problems with many organ systems in your body.  It can also affect the growth of your baby. The exact cause of preeclampsia is unknown, however, there are signs and symptoms to watch for:    -A bad headache that doesn't improve with Tylenol  -Visual changes such as spots, flashes of light, blurry vision  -Pain in the upper right part of your abdomen, especially under the ribs that doesn't go away  -Nausea and/or vomiting  -Feeling extremely tired  -Yellowing of the skin and/or eyes  -Feeling \"not quite right\" or that something is wrong  -An extreme amount of swelling (some swelling in pregnancy is very normal)    If your midwife feels that you are developing preeclampsia, you will have lab tests drawn and will be monitored very closely.     If you are experiencing anyof these symptoms, call the [facility] immediately at [phone].  "

## 2017-06-20 NOTE — MR AVS SNAPSHOT
"              After Visit Summary   2017    Nhi Alexander    MRN: [id number]           Patient Information     Date Of Birth          1985        Visit Information        Provider Department      2017 9:00 AM Celeste Cadet APRN CNM [facility]        Today's Diagnoses     Severe preeclampsia, third trimester    -  1    Vitamin D insufficiency        Encounter for supervision of high-risk pregnancy with history of infertility in second trimester           Follow-ups after your visit        Who to contact     If you have questions or need follow up information about today's clinic visit or your schedule please contact [facility] directly at [phone].  Normal or non-critical lab and imaging results will be communicated to you by Locate[place]hart, letter or phone within 4 business days after the clinic has received the results. If you do not hear from us within 7 days, please contact the clinic through Loc[employer] or phone. If you have a critical or abnormal lab result, we will notify you by phone as soon as possible.  Submit refill requests through [employer] or call your pharmacy and they will forward the refill request to us. Please allow 3 business days for your refill to be completed.          Additional Information About Your Visit        MyChart Information     [employer] lets you send messages to your doctor, view your test results, renew your prescriptions, schedule appointments and more. To sign up, go to [email or web address] . Click on \"Log in\" on the left side of the screen, which will take you to the Welcome page. Then click on \"Sign up Now\" on the right side of the page.     You will be asked to enter the access code listed below, as well as some personal information. Please follow the directions to create your username and password.     Your access code is: [email or web address]  Expires: 2017 10:28 AM     Your access code will  in 90 days. If you " need help or a new code, please call your [facility] clinic or [phone].        Care EveryWhere ID     This is your Care EveryWhere ID. This could be used by other organizations to access your [facility] medical records  [id number]        Your Vitals Were     Last Period                   12/22/2016            Blood Pressure from Last 3 Encounters:   06/19/17 (!) 144/95   06/18/17 144/88   06/08/17 148/90    Weight from Last 3 Encounters:   06/19/17 245 lb 8 oz (111.4 kg)   06/18/17 245 lb 4.8 oz (111.3 kg)   06/05/17 247 lb (112 kg)              We Performed the Following     ALT     AST     CBC with platelets differential     Creatinine random urine     Protein  random urine     Uric acid        Primary Care Provider    Physician No Ref-Primary       No address on file        Thank you!     Thank you for choosing [facility]  for your care. Our goal is always to provide you with excellent care. Hearing back from our patients is one way we can continue to improve our services. Please take a few minutes to complete the written survey that you may receive in the mail after your visit with us. Thank you!             Your Updated Medication List - Protect others around you: Learn how to safely use, store and throw away your medicines at www.disposemymeds.org.          This list is accurate as of: 6/20/17 10:17 AM.  Always use your most recent med list.                   Brand Name Dispense Instructions for use    acetaminophen 325 MG tablet    TYLENOL    60 tablet    Take 2 tablets (650 mg) by mouth every 6 hours as needed for mild pain or fever       amoxicillin-clavulanate 875-125 MG per tablet    AUGMENTIN    10 tablet    Take 1 tablet by mouth 2 times daily for 5 days       ibuprofen 600 MG tablet    ADVIL/MOTRIN    60 tablet    Take 1 tablet (600 mg) by mouth every 6 hours       NIFEdipine ER osmotic 30 MG 24 hr tablet    PROCARDIA XL    14 tablet    Take 1 tablet (30 mg) by mouth daily        oxyCODONE-acetaminophen 5-325 MG per tablet    PERCOCET    45 tablet    Take 1-2 tablets by mouth every 4 hours as needed for pain       PRENATAL VITAMIN PO      Take by mouth daily       senna-docusate 8.6-50 MG per tablet    SENOKOT-S;PERICOLACE    60 tablet    Take 1-2 tablets by mouth 2 times daily

## 2017-06-21 ENCOUNTER — OFFICE VISIT (OUTPATIENT)
Dept: OBGYN | Facility: CLINIC | Age: 32
End: 2017-06-21
Attending: OBSTETRICS & GYNECOLOGY
Payer: COMMERCIAL

## 2017-06-21 VITALS — DIASTOLIC BLOOD PRESSURE: 84 MMHG | HEART RATE: 82 BPM | SYSTOLIC BLOOD PRESSURE: 130 MMHG

## 2017-06-21 DIAGNOSIS — O14.90 PREECLAMPSIA, UNSPECIFIED TRIMESTER: Primary | ICD-10-CM

## 2017-06-21 PROCEDURE — 99212 OFFICE O/P EST SF 10 MIN: CPT | Mod: ZF

## 2017-06-21 NOTE — PROGRESS NOTES
Can you please call Nhi and let her know that her labs from yesterday were within normal limits?  Thanks!    Celeste MCNEIL CNM

## 2017-06-21 NOTE — MR AVS SNAPSHOT
After Visit Summary   2017    Nhi Alexander    MRN: [id number]           Patient Information     Date Of Birth          1985        Visit Information        Provider Department      2017 9:00 AM Shannen Hutchins MD [facility]        Today's Diagnoses     Preeclampsia, unspecified trimester    -  1       Follow-ups after your visit        Who to contact     Please call your clinic at [phone] to:    Ask questions about your health    Make or cancel appointments    Discuss your medicines    Learn about your test results    Speak to your doctor   If you have compliments or concerns about an experience at your clinic, or if you wish to file a complaint, please contact [facility] Patient Relations at [phone] or email us at [email or web address]         Additional Information About Your Visit        MyChart Information     [employer] is an electronic gateway that provides easy, online access to your medical records. With [employer], you can request a clinic appointment, read your test results, renew a prescription or communicate with your care team.     To sign up for Localsensort visit the website at [email or web address]   You will be asked to enter the access code listed below, as well as some personal information. Please follow the directions to create your username and password.     Your access code is: [email or web address]  Expires: 2017 10:28 AM     Your access code will  in 90 days. If you need help or a new code, please contact your [facility] or call [phone] for assistance.        Care EveryWhere ID     This is your Care EveryWhere ID. This could be used by other organizations to access your [place] medical records  [id number]        Your Vitals Were     Pulse Last Period                82 2016           Blood Pressure from Last 3 Encounters:   17 130/84    17 (!) 144/95   17 144/88    Weight from Last 3 Encounters:   17 111.4 kg (245 lb 8 oz)   17 111.3 kg (245 lb 4.8 oz)   17 112 kg (247 lb)              Today, you had the following     No orders found for display       Primary Care Provider    Physician No Ref-Primary       No address on file        Equal Access to Services     GREG ZULETA : Hadii aad ku haddericko Soheribertoali, waaxda luqadaha, qaybta kaalmada adeegyada, waxay andrein janin giovani chelle anna . So [facility] [phone].    ATENCIÓN: Si scot collins, tiene a helm disposición servicios gratuitos de asistencia lingüística. Llame al [phone].    We comply with applicable federal civil rights laws and [place] laws. We do not discriminate on the basis of race, color, national origin, age, disability sex, sexual orientation or gender identity.            Thank you!     Thank you for choosing [facility]  for your care. Our goal is always to provide you with excellent care. Hearing back from our patients is one way we can continue to improve our services. Please take a few minutes to complete the written survey that you may receive in the mail after your visit with us. Thank you!             Your Updated Medication List - Protect others around you: Learn how to safely use, store and throw away your medicines at www.disposemymeds.org.          This list is accurate as of: 17 11:59 PM.  Always use your most recent med list.                   Brand Name Dispense Instructions for use Diagnosis    acetaminophen 325 MG tablet    TYLENOL    60 tablet    Take 2 tablets (650 mg) by mouth every 6 hours as needed for mild pain or fever    S/P  section       amoxicillin-clavulanate 875-125 MG per tablet    AUGMENTIN    10 tablet    Take 1 tablet by mouth 2 times daily for 5 days    Urinary tract infection       ibuprofen 600 MG tablet    ADVIL/MOTRIN    60 tablet    Take 1 tablet (600 mg) by mouth every 6  hours    S/P  section       NIFEdipine ER osmotic 30 MG 24 hr tablet    PROCARDIA XL    14 tablet    Take 2 tablets (60 mg) by mouth daily    Preeclampsia, unspecified trimester       oxyCODONE-acetaminophen 5-325 MG per tablet    PERCOCET    45 tablet    Take 1-2 tablets by mouth every 4 hours as needed for pain    S/P  section       PRENATAL VITAMIN PO      Take by mouth daily        senna-docusate 8.6-50 MG per tablet    SENOKOT-S;PERICOLACE    60 tablet    Take 1-2 tablets by mouth 2 times daily    S/P  section

## 2017-06-21 NOTE — LETTER
2017       RE: Nhi Cutler  [address]  [place] [address]     Dear Colleague,    Thank you for referring your patient, Nhi Cutler, to the [facility] at [facility]. Please see a copy of my visit note below.    31 yo  here for BP and incision check.     Delivered at 24+6 by classical  due to pre term labor. States infant is touch and go day by day. Very stressful.     Past Surgical History:   Procedure Laterality Date      SECTION N/A 2017    Procedure:  SECTION;;  Surgeon: Shirley Alfredo MD;  [facility]: [facility] L[facility]     DAVINCI LYSIS OF ADHESIONS N/A 2015    Procedure: DAVINCI LYSIS OF ADHESIONS;  Surgeon: Kimberly Suarez MD;  Location: [facility] OR     DAVINCI PELVIC PROCEDURE N/A 2015    Procedure: DAVINCI XI PELVIC PROCEDURE;  Surgeon: Kimberly Suarez MD;  Location:  OR     DILATION AND CURETTAGE, OPERATIVE HYSTEROSCOPY WITH MORCELLATOR, COMBINED N/A 2015    Procedure: COMBINED DILATION AND CURETTAGE, OPERATIVE HYSTEROSCOPY WITH MORCELLATOR;  Surgeon: Kimberly Suarez MD;  Location:  OR     LAPAROSCOPIC TUBAL DYE STUDY Bilateral 2015    Procedure: LAPAROSCOPIC TUBAL DYE STUDY;  Surgeon: Kimberly Suarez MD;  Location:  OR     LAPAROSCOPY DIAGNOSTIC (GYN) N/A 2015    Procedure: LAPAROSCOPY DIAGNOSTIC (GYN);  Surgeon: Kimberly Suarez MD;  Location:  OR     Obstetric History       T0      L1     SAB0   TAB0   Ectopic0   Multiple0   Live Births1       # Outcome Date GA Lbr Holden/2nd Weight Sex Delivery Anes PTL Lv   1  17 24w6d  0.46 kg (1 lb 0.2 oz) F CS-Classical   PEDRO      Name: ALLYSON CUTLER      Apgar1:  7                Apgar5: 8        /84  Pulse 82  LMP 2016  Appears well, fatigued, mildly anxious  Abdomen: soft, NT, ND. Incision CDI without erythema, edema, induration or exudate.   Steri strips removed  LE: 3+  edema bilaterally    A: good BP and normal incision check  P: continue on Procardia XL 60 mg daily until next week then RN visit for BP check and then we will determine if needs to stay on Procardia.     Shannen Hutchins

## 2017-06-22 ENCOUNTER — TELEPHONE (OUTPATIENT)
Dept: OBGYN | Facility: CLINIC | Age: 32
End: 2017-06-22

## 2017-06-22 RX ORDER — BREAST PUMP
1 EACH MISCELLANEOUS
Qty: 1 EACH | Refills: 0 | Status: SHIPPED | OUTPATIENT
Start: 2017-06-14 | End: 2017-07-17

## 2017-06-22 NOTE — PROGRESS NOTES
33 yo  here for BP and incision check.     Delivered at 24+6 by classical  due to pre term labor. States infant is touch and go day by day. Very stressful.     Past Surgical History:   Procedure Laterality Date      SECTION N/A 2017    Procedure:  SECTION;;  Surgeon: Shirley Alfredo MD;  [facility]     DAVINCI LYSIS OF ADHESIONS N/A 2015    Procedure: DAVINCI LYSIS OF ADHESIONS;  Surgeon: Kimberly Suarez MD;  Location:  OR     DAVINCI PELVIC PROCEDURE N/A 2015    Procedure: DAVINCI XI PELVIC PROCEDURE;  Surgeon: Kimberly Suarez MD;  Location:  OR     DILATION AND CURETTAGE, OPERATIVE HYSTEROSCOPY WITH MORCELLATOR, COMBINED N/A 2015    Procedure: COMBINED DILATION AND CURETTAGE, OPERATIVE HYSTEROSCOPY WITH MORCELLATOR;  Surgeon: Kimberly Suarez MD;  Location:  OR     LAPAROSCOPIC TUBAL DYE STUDY Bilateral 2015    Procedure: LAPAROSCOPIC TUBAL DYE STUDY;  Surgeon: Kimberly Suarez MD;  Location:  OR     LAPAROSCOPY DIAGNOSTIC (GYN) N/A 2015    Procedure: LAPAROSCOPY DIAGNOSTIC (GYN);  Surgeon: Kimberly Suarez MD;  Location:  OR     Obstetric History       T0      L1     SAB0   TAB0   Ectopic0   Multiple0   Live Births1       # Outcome Date GA Lbr Holden/2nd Weight Sex Delivery Anes PTL Lv   1  17 24w6d  0.46 kg (1 lb 0.2 oz) F CS-[facility]   PEDRO      Name: ALLYSON CUTLER      Apgar1:  7                Apgar5: 8        /84  Pulse 82  LMP 2016  Appears well, fatigued, mildly anxious  Abdomen: soft, NT, ND. Incision CDI without erythema, edema, induration or exudate.   Steri strips removed  LE: 3+ edema bilaterally    A: good BP and normal incision check  P: continue on Procardia XL 60 mg daily until next week then RN visit for BP check and then we will determine if needs to stay on Procardia.     Shannen Hutchins

## 2017-06-22 NOTE — TELEPHONE ENCOUNTER
Ordered and had Dr Alfredo signed prescription that Judith needs a hospital grade electric pump d/t delivering pre-term infant. Emailed it to [email or web address]

## 2017-06-24 ASSESSMENT — ENCOUNTER SYMPTOMS
HEMATOLOGIC/LYMPHATIC NEGATIVE: 1
FEVER: 0
ABDOMINAL PAIN: 1
FLANK PAIN: 0
NAUSEA: 0
JOINT SWELLING: 0
COUGH: 0
SHORTNESS OF BREATH: 0
CHILLS: 0
MYALGIAS: 0
VOMITING: 0
HEADACHES: 1
DYSURIA: 0

## 2017-06-29 LAB — LAB SCANNED RESULT: NORMAL

## 2017-06-30 ENCOUNTER — TELEPHONE (OUTPATIENT)
Dept: MIDWIFE SERVICES | Facility: CLINIC | Age: 32
End: 2017-06-30

## 2017-06-30 ENCOUNTER — ALLIED HEALTH/NURSE VISIT (OUTPATIENT)
Dept: OBGYN | Facility: CLINIC | Age: 32
End: 2017-06-30
Payer: COMMERCIAL

## 2017-06-30 VITALS — DIASTOLIC BLOOD PRESSURE: 79 MMHG | SYSTOLIC BLOOD PRESSURE: 117 MMHG | HEART RATE: 76 BPM

## 2017-06-30 DIAGNOSIS — O14.90 PREECLAMPSIA, UNSPECIFIED TRIMESTER: Primary | ICD-10-CM

## 2017-06-30 DIAGNOSIS — Z01.30 BLOOD PRESSURE CHECK: Primary | ICD-10-CM

## 2017-06-30 PROCEDURE — 40000269 ZZH STATISTIC NO CHARGE FACILITY FEE: Mod: ZF

## 2017-06-30 RX ORDER — NIFEDIPINE 30 MG/1
60 TABLET, EXTENDED RELEASE ORAL DAILY
Qty: 28 TABLET | Refills: 0 | Status: SHIPPED | OUTPATIENT
Start: 2017-06-30 | End: 2017-07-17

## 2017-06-30 NOTE — NURSING NOTE
Chief Complaint   Patient presents with     Allied Health Visit     OB 24 weeks B/P check      Nhi Alexander is a 32 year old female who presents today for B/P check see vitals for details.  Michelle Billings LPN

## 2017-06-30 NOTE — TELEPHONE ENCOUNTER
Nhi called asking about follow up regarding pre-eclampsia and medications.  She states that she followed up with OB/GYN last week and this week for BP checks.  She states that her BPs have been WNL at clinic and at home.  She does not currently have any follow up appointments scheduled with OB/GYN and wondered if she needed follow up. She also states that she is almost out of her medications.      Refill of procardia XL 60mg PO QD x 14 days sent to preferred pharmacy.  Counseled to make appointment at this clinic for next week for BP check with visit.  Plan for weekly BP check and visit for 2 weeks, continue 60mg procardia XL PO qd until otherwise directed.  Patient verbalized understanding, prescription sent to pharmacy, all questions answered.  Patient states that she will call Monday for appointment next week.      Celeste MCNEIL CNM

## 2017-06-30 NOTE — MR AVS SNAPSHOT
After Visit Summary   2017    Nhi Alexander    MRN: [id number]           Patient Information     Date Of Birth          1985        Visit Information        Provider Department      2017 2:30 PM Nurse, [facility]        Today's Diagnoses     Blood pressure check    -  1       Follow-ups after your visit        Your next 10 appointments already scheduled     2017  2:30 PM CDT   Nurse Visit with [facility] Nurse   [facility] ([facility])    [facility]  [address]  [address]  [facility] [place] [id number] [phone]              Who to contact     Please call your clinic at [phone] to:    Ask questions about your health    Make or cancel appointments    Discuss your medicines    Learn about your test results    Speak to your doctor   If you have compliments or concerns about an experience at your clinic, or if you wish to file a complaint, please contact [facility] Patient Relations at [phone] or email us at [email or web address]         Additional Information About Your Visit        MyChart Information     Zipit Wireless is an electronic gateway that provides easy, online access to your medical records. With Zipit Wireless, you can request a clinic appointment, read your test results, renew a prescription or communicate with your care team.     To sign up for Crediterat visit the website at [email or web address]   You will be asked to enter the access code listed below, as well as some personal information. Please follow the directions to create your username and password.     Your access code is: [email or web address]  Expires: 2017 10:28 AM     Your access code will  in 90 days. If you need help or a new code, please contact your [facility] or call [phone] for assistance.        Care EveryWhere ID     This is your  Care EveryWhere ID. This could be used by other organizations to access your [place] medical records  [id number]        Your Vitals Were     Pulse Last Period                76 2016           Blood Pressure from Last 3 Encounters:   17 117/79   17 130/84   17 (!) 144/95    Weight from Last 3 Encounters:   17 111.4 kg (245 lb 8 oz)   17 111.3 kg (245 lb 4.8 oz)   17 112 kg (247 lb)              Today, you had the following     No orders found for display       Primary Care Provider    Physician No Ref-Primary       No address on file        Equal Access to Services     [facility] : Hadii bernardo Conn, beau alas, myke cochranmasaturnino mcmillan, cally castillo . So [facility] [phone].    ATENCIÓN: Si habla español, tiene a helm disposición servicios gratuitos de asistencia lingüística. Ll[facility] [phone].    We comply with applicable federal civil rights laws and [place] laws. We do not discriminate on the basis of race, color, national origin, age, disability sex, sexual orientation or gender identity.            Thank you!     Thank you for choosing [facility]  for your care. Our goal is always to provide you with excellent care. Hearing back from our patients is one way we can continue to improve our services. Please take a few minutes to complete the written survey that you may receive in the mail after your visit with us. Thank you!             Your Updated Medication List - Protect others around you: Learn how to safely use, store and throw away your medicines at www.disposemymeds.org.          This list is accurate as of: 17  2:09 PM.  Always use your most recent med list.                   Brand Name Dispense Instructions for use Diagnosis    acetaminophen 325 MG tablet    TYLENOL    60 tablet    Take 2 tablets (650 mg) by mouth every 6 hours as needed for mild pain or fever    S/P  section        breast pump Misc     1 each    1 each every 3 hours Judith delivered 17 pre-term infant at 24 5/7 weeks so needs Hospital Grade Electric Breast pump for at least 3 months    S/P  section       ibuprofen 600 MG tablet    ADVIL/MOTRIN    60 tablet    Take 1 tablet (600 mg) by mouth every 6 hours    S/P  section       NIFEdipine ER osmotic 30 MG 24 hr tablet    PROCARDIA XL    14 tablet    Take 2 tablets (60 mg) by mouth daily    Preeclampsia, unspecified trimester       oxyCODONE-acetaminophen 5-325 MG per tablet    PERCOCET    45 tablet    Take 1-2 tablets by mouth every 4 hours as needed for pain    S/P  section       PRENATAL VITAMIN PO      Take by mouth daily        senna-docusate 8.6-50 MG per tablet    SENOKOT-S;PERICOLACE    60 tablet    Take 1-2 tablets by mouth 2 times daily    S/P  section

## 2017-07-02 ENCOUNTER — LACTATION ENCOUNTER (OUTPATIENT)
Age: 32
End: 2017-07-02

## 2017-07-02 NOTE — LACTATION NOTE
This note was copied from a baby's chart.  D:  I talked with Nhi via phone today.  I:  I asked whether she had been able to incorporate the techniques discussed last week (hand expression, hands on pumping) and she said she is trying to.  She increased from 4 pumpings per day to 6, but said her daily total has not increased, she remains at 150 ml/day.  Previous discussion revealed she did have breast growth with pregnancy, her blood pressure and lower extremity edema was improving, only other risk factor her history of infertility.  I suggested it may be helpful to check some labs to rule out new thyroid issue, retained placenta, or theca luteal cysts, and to see what her prolactin level is.  She said she is going in to her MD for a blood pressure check next week and could request these labs.  I said I would leave a note at bedside with a list of labs.  I also asked her to increase her pumping frequency to x8/day (as we recommend) to see if she sees any increase in supply.  A:  Mom who was ill at delivery and continued to have issues postpartum, including UTI, which led to infrequent early pumping.  Now pumping more frequently (though not as frequently as recommended) and not seeing any change in supply.  P:  Will continue to provide lactation support.      Yohana Pollock, RNC, IBCLC

## 2017-07-05 ENCOUNTER — TELEPHONE (OUTPATIENT)
Dept: OBGYN | Facility: CLINIC | Age: 32
End: 2017-07-05

## 2017-07-05 NOTE — TELEPHONE ENCOUNTER
Patient has a question about Depression Medication she is taking  [phone] (home) none (work)

## 2017-07-05 NOTE — TELEPHONE ENCOUNTER
LM on VM (PHI on consent) noting Kellie's message below and to call her OB/GYN ASAP and ask as this is out the scope of midwifery and they are ahead of it and our midwife here is not able to answer that question.

## 2017-07-05 NOTE — TELEPHONE ENCOUNTER
Pt calling she is on NIFEdipine ER osmotic (PROCARDIA XL) 60 MG daily for severe preeclampsia post partum. She forgot to take her dose last evening and wondering if she should double up tonight or what she should do. Takes it usually in the evening before bed. Took her BP this morning at 10:00 AM at home and was 121/93 mmHg. States she feels fine and denied: headache, vision issues, dizziness, lightheadedness,chest pain/SOB.  Routing to Kellie Beatty CNM (midwife in office to review and advise).

## 2017-07-06 ENCOUNTER — TELEPHONE (OUTPATIENT)
Dept: OBGYN | Facility: CLINIC | Age: 32
End: 2017-07-06

## 2017-07-06 DIAGNOSIS — O92.79: Primary | ICD-10-CM

## 2017-07-06 DIAGNOSIS — E55.9 VITAMIN D INSUFFICIENCY: ICD-10-CM

## 2017-07-06 DIAGNOSIS — O92.79: ICD-10-CM

## 2017-07-06 LAB
B-HCG SERPL-ACNC: 8 IU/L (ref 0–5)
PROLACTIN SERPL-MCNC: 208 UG/L (ref 3–27)
TSH SERPL DL<=0.005 MIU/L-ACNC: 2.16 MU/L (ref 0.4–4)

## 2017-07-06 PROCEDURE — 84403 ASSAY OF TOTAL TESTOSTERONE: CPT | Performed by: ADVANCED PRACTICE MIDWIFE

## 2017-07-06 PROCEDURE — 84270 ASSAY OF SEX HORMONE GLOBUL: CPT | Performed by: ADVANCED PRACTICE MIDWIFE

## 2017-07-06 PROCEDURE — 84146 ASSAY OF PROLACTIN: CPT | Performed by: ADVANCED PRACTICE MIDWIFE

## 2017-07-06 PROCEDURE — 84702 CHORIONIC GONADOTROPIN TEST: CPT | Performed by: ADVANCED PRACTICE MIDWIFE

## 2017-07-06 PROCEDURE — 82306 VITAMIN D 25 HYDROXY: CPT | Performed by: ADVANCED PRACTICE MIDWIFE

## 2017-07-06 PROCEDURE — 36415 COLL VENOUS BLD VENIPUNCTURE: CPT | Performed by: ADVANCED PRACTICE MIDWIFE

## 2017-07-06 PROCEDURE — 84443 ASSAY THYROID STIM HORMONE: CPT | Performed by: ADVANCED PRACTICE MIDWIFE

## 2017-07-06 NOTE — TELEPHONE ENCOUNTER
Spoke with Nhi who is boarding in with her baby in NICU. Lactation suggested she get labs done for her low milk supply. They are recommending TSH, HCG, Prolactin, and testosterone levels. She is also wondering when she should stop her Procardia medication.    Nurse spoke with Dr. Alfredo on call. She agreed to order the labs but states that patient must follow up with lactation about the results because she is unaware of how the labs affect lactation. Also patient should cut her 60mg of Procardia down to 30mg of Procardia for a week and monitor BP. If normal for a week on 30mg then she can d/c. Continue to monitor BP off of meds.     Patient agreeable to this plan and will follow up with lactation.

## 2017-07-07 LAB — DEPRECATED CALCIDIOL+CALCIFEROL SERPL-MC: 24 UG/L (ref 20–75)

## 2017-07-08 LAB
SHBG SERPL-SCNC: 50 NMOL/L (ref 30–135)
TESTOST FREE SERPL-MCNC: 0.27 NG/DL (ref 0.13–0.92)
TESTOST SERPL-MCNC: 20 NG/DL (ref 8–60)

## 2017-07-12 LAB — NON INVASIVE PRENATAL TEST CELL FREE DNA: NORMAL

## 2017-07-17 ENCOUNTER — OFFICE VISIT (OUTPATIENT)
Dept: OBGYN | Facility: CLINIC | Age: 32
End: 2017-07-17
Attending: OBSTETRICS & GYNECOLOGY
Payer: COMMERCIAL

## 2017-07-17 VITALS
BODY MASS INDEX: 38.76 KG/M2 | DIASTOLIC BLOOD PRESSURE: 90 MMHG | SYSTOLIC BLOOD PRESSURE: 136 MMHG | HEART RATE: 95 BPM | HEIGHT: 66 IN | WEIGHT: 241.2 LBS

## 2017-07-17 DIAGNOSIS — Z98.891 S/P CESAREAN SECTION: Primary | ICD-10-CM

## 2017-07-17 DIAGNOSIS — F41.9 ANXIETY: ICD-10-CM

## 2017-07-17 DIAGNOSIS — R39.89 SENSATION OF PRESSURE IN BLADDER AREA: ICD-10-CM

## 2017-07-17 LAB
ALBUMIN UR-MCNC: NEGATIVE MG/DL
APPEARANCE UR: CLEAR
BACTERIA #/AREA URNS HPF: ABNORMAL /HPF
BILIRUB UR QL STRIP: NEGATIVE
COLOR UR AUTO: YELLOW
GLUCOSE UR STRIP-MCNC: NEGATIVE MG/DL
HGB UR QL STRIP: ABNORMAL
KETONES UR STRIP-MCNC: NEGATIVE MG/DL
LEUKOCYTE ESTERASE UR QL STRIP: NEGATIVE
NITRATE UR QL: NEGATIVE
PH UR STRIP: 7.5 PH (ref 5–7)
RBC #/AREA URNS AUTO: 2 /HPF (ref 0–2)
SP GR UR STRIP: 1.01 (ref 1–1.03)
SQUAMOUS #/AREA URNS AUTO: 1 /HPF (ref 0–1)
URN SPEC COLLECT METH UR: ABNORMAL
UROBILINOGEN UR STRIP-MCNC: NORMAL MG/DL (ref 0–2)
WBC #/AREA URNS AUTO: 4 /HPF (ref 0–2)

## 2017-07-17 PROCEDURE — 99214 OFFICE O/P EST MOD 30 MIN: CPT

## 2017-07-17 PROCEDURE — 87086 URINE CULTURE/COLONY COUNT: CPT | Performed by: OBSTETRICS & GYNECOLOGY

## 2017-07-17 PROCEDURE — 81001 URINALYSIS AUTO W/SCOPE: CPT | Performed by: OBSTETRICS & GYNECOLOGY

## 2017-07-17 NOTE — LETTER
Date:July 21, 2017      Patient was self referred, no letter generated. Do not send.        [facility] Health Information

## 2017-07-17 NOTE — MR AVS SNAPSHOT
After Visit Summary   2017    Nhi Alexander    MRN: [id number]           Patient Information     Date Of Birth          1985        Visit Information        Provider Department      2017 2:45 PM Shirley Alfredo MD [facility]        Today's Diagnoses     S/P  section    -  1    Anxiety         delivery        Sensation of pressure in bladder area           Follow-ups after your visit        Additional Services     PSYCHOLOGY REFERRAL       Your provider has referred you to:  Dr. Hill    Please be aware that coverage of these services is subject to the terms and limitations of your health insurance plan.  Call member services at your health plan with any benefit or coverage questions.      Please bring the following to your appointment:    >>   Any x-rays, CTs or MRIs which have been performed.  Contact the facility where they were done to arrange for  prior to your scheduled appointment.   >>   List of current medications   >>   This referral request   >>   Any documents/labs given to you for this referral                  Follow-up notes from your care team     Return in about 2 weeks (around 2017) for Postpartum visit with Sayda.      Your next 10 appointments already scheduled     2017  2:00 PM CDT   Return Visit with Kellie Hill, PhD    [facility]  ([facility])    [facility]  [address]rd [address]  [address]  [facility] [id number] [phone]            2017  3:30 PM CDT   RETURN EXTENDED with Shirley Alfredo MD   [facility] ([facility])    [facility] [address]  [address]  [address]  [facility] [id number] [phone]              Who to contact     Please call your clinic at [phone] to:    Ask questions about your health    Make or cancel appointments    Discuss your  "medicines    Learn about your test results    Speak to your doctor   If you have compliments or concerns about an experience at your clinic, or if you wish to file a complaint, please contact [facility] Patient Relations at [phone] or email us at [email or web address]         Additional Information About Your Visit        PEERhart Information     Abigail Stewartt is an electronic gateway that provides easy, online access to your medical records. With Airu, you can request a clinic appointment, read your test results, renew a prescription or communicate with your care team.     To sign up for Airu visit the website at [email or web address]   You will be asked to enter the access code listed below, as well as some personal information. Please follow the directions to create your username and password.     Your access code is: [email or web address]  Expires: 2017 10:28 AM     Your access code will  in 90 days. If you need help or a new code, please contact your [facility] or call [phone] for assistance.        Care EveryWhere ID     This is your Care EveryWhere ID. This could be used by other organizations to access your [place] medical records  [id number]        Your Vitals Were     Pulse Height Last Period BMI (Body Mass Index)          95 1.676 m (5' 6\") 2016 38.93 kg/m2         Blood Pressure from Last 3 Encounters:   17 136/90   17 117/79   17 130/84    Weight from Last 3 Encounters:   17 109.4 kg (241 lb 3.2 oz)   17 111.4 kg (245 lb 8 oz)   17 111.3 kg (245 lb 4.8 oz)              We Performed the Following     PSYCHOLOGY REFERRAL     Urinalysis - No culture (Collected in Clinic)     Urine Culture Aerobic Bacterial          Today's Medication Changes          These changes are accurate as of: 17 11:59 PM.  If you have any questions, ask your nurse or doctor.               Start " taking these medicines.        Dose/Directions    sertraline 50 MG tablet   Commonly known as:  ZOLOFT   Used for:  Anxiety   Started by:  Shirley Alfredo MD        Take 1/2 tablet (25 mg) for 1-2 weeks, then increase to 1 tablet orally daily   Quantity:  30 tablet   Refills:  0         Stop taking these medicines if you haven't already. Please contact your care team if you have questions.     breast pump Misc   Stopped by:  Shirley Alfredo MD           NIFEdipine ER osmotic 30 MG 24 hr tablet   Commonly known as:  PROCARDIA XL   Stopped by:  Shirley Alfredo MD           oxyCODONE-acetaminophen 5-325 MG per tablet   Commonly known as:  PERCOCET   Stopped by:  Shirley Alfredo MD           senna-docusate 8.6-50 MG per tablet   Commonly known as:  SENOKOT-S;PERICOLACE   Stopped by:  Shirley Alfredo MD                Where to get your medicines      These medications were sent to [facility] - [place], [place] - [address] [facility] [place] [address]  [address] [address] [facility] [place] [address]    Hours:  24-hours Phone:  [phone]     sertraline 50 MG tablet                Primary Care Provider    Physician No Ref-Primary       No address on file        Equal Access to Services     GREG ZULETA : Hadlen barcenaso Soomaali, waaxda luqadaha, qaybta kaalmada adeegyada, waxay tala solis. So [facility] [phone].    ATENCIÓN: Si habla español, tiene a helm disposición servicios gratuitos de asistencia lingüística. Rahul al [phone].    We comply with applicable federal civil rights laws and [place] laws. We do not discriminate on the basis of race, color, national origin, age, disability sex, sexual orientation or gender identity.            Thank you!     Thank you for choosing [facility]  for your care. Our goal is always to provide you with excellent care. Hearing back from our patients is one way we can continue to improve our  services. Please take a few minutes to complete the written survey that you may receive in the mail after your visit with us. Thank you!             Your Updated Medication List - Protect others around you: Learn how to safely use, store and throw away your medicines at www.disposemymeds.org.          This list is accurate as of: 17 11:59 PM.  Always use your most recent med list.                   Brand Name Dispense Instructions for use Diagnosis    acetaminophen 325 MG tablet    TYLENOL    60 tablet    Take 2 tablets (650 mg) by mouth every 6 hours as needed for mild pain or fever    S/P  section       ibuprofen 600 MG tablet    ADVIL/MOTRIN    60 tablet    Take 1 tablet (600 mg) by mouth every 6 hours    S/P  section       PRENATAL VITAMIN PO      Take by mouth daily        sertraline 50 MG tablet    ZOLOFT    30 tablet    Take 1/2 tablet (25 mg) for 1-2 weeks, then increase to 1 tablet orally daily    Anxiety

## 2017-07-17 NOTE — LETTER
7/17/2017       RE: Nhi Alexander  [address]  [place] [address]     Dear Colleague,    Thank you for referring your patient, Nhi Alexander, to the [facility] at [facility]. Please see a copy of my visit note below.    Gynecology Visit Note  7/17/17    Reason for visit: Concerns after delivery    S: Patient is a 33 yo  s/p PCCS at 24w6d for preeclampsia with severe features and fetus with asymmetric IUGR.  She was treated with Magnesium for 24 hours postpartum and then discharged with plan for f/u BP.  Patient was then seen in ED POD#5 with elevated BP and was started on Procardia 30 XL daily.  She started this but then was found to have continued elevated blood pressure the following day and as such her dose was increased to 60 mg XL daily.  2 weeks later patient had improved BP and her dose was cut down to 30 mg XL.  She has subsequently discontinued her Procardia entirely about a week ago.    Patient has specific concerns today she would like to discuss.  Of note, patient is tearful through the entirety of our visit and discussion today.  1) Patient states she had bleeding that lasted for maybe a couple weeks after delivery that then ended.  2 days ago she had onset of bright red bleeding that is similar to her prior periods, changing a pad every 2 hours, not soaked, just for comfort.  She has cramping similar to a period.  She is wondering if this really is her period as she thought it would be a while before this came back  2) Patient also complaining of discomfort/pressure in her lower abdomen when urinating.  It starts before and after she urinates, not during.  She denies dysuria or flank pain.  No fevers/chills.  Just wondering if still healing or if there is anything she should be worrying about.  3) Baby in NICU: Patient's daughter is having a very difficult course in the NICU which was not unexpected given IUGR and  "early gestational age at delivery.  Patient very tearful about this and states she is trying to stay strong but hard because she is incredibly anxious about everything.  She states she can't stop her mind from thinking and this prevents her from sleeping.  She is wondering if there is anything she can do about this as she feels she needs to address these concerns.  She is open to both medical and talk therapy today.    O:  Vitals:    07/17/17 1453   BP: 136/90   Pulse: 95   Weight: 109.4 kg (241 lb 3.2 oz)   Height: 1.676 m (5' 6\")     General: NAD, A&Ox3  Resp: Non-labored breathing    A/P: 33 yo  presents for acute post-partum visit to discuss specific concerns  1) Bleeding: Patient's bleeding pattern and timing is likely c/w resumption of menses.  Given early gestational age at delivery and low milk production this is not necessarily unexpected and we talked about monitoring her bleeding and seeing if the bleeding course is c/w period.  If not, she should notify us.  2) Urinary discomfort: Will get UA/UCx today.  Sounds most c/w healing process but will ensure no infection  3) Anxiety/sleep disturbance/Mood changes: Discussed with patient given events surrounding her delivery and baby in NICU, not unexpected for her to have mood changes.  That being said, she does seem to have symptoms greater than just typical postpartum blues or anxiety, and I did discuss with her that I thought treatment would benefit her.  Discussed typical use of SSRI in postpartum period to assist with symptoms even to address anxiety as would not recommend benzos with breastfeeding.  Patient desires to try this.  Given Rx for Zoloft titration.  Patient also very open to talk therapy and given urgent referral to Kellie Hill with psychology.  3) Patient to RTC in 2 weeks for mood check, postpartum visit, contraception discussion, patient understands and is agreeable with this plan    Shirley Alfredo MD       Again, thank you for " allowing me to participate in the care of your patient.      Sincerely,    Shirley Alfredo MD

## 2017-07-18 ENCOUNTER — OFFICE VISIT (OUTPATIENT)
Dept: PSYCHOLOGY | Facility: CLINIC | Age: 32
End: 2017-07-18
Payer: COMMERCIAL

## 2017-07-18 DIAGNOSIS — F43.22 ADJUSTMENT DISORDER WITH ANXIOUS MOOD: ICD-10-CM

## 2017-07-18 DIAGNOSIS — F33.1 MODERATE EPISODE OF RECURRENT MAJOR DEPRESSIVE DISORDER (H): Primary | ICD-10-CM

## 2017-07-18 LAB
BACTERIA SPEC CULT: NO GROWTH
Lab: NORMAL
MICRO REPORT STATUS: NORMAL
SPECIMEN SOURCE: NORMAL

## 2017-07-18 NOTE — MR AVS SNAPSHOT
After Visit Summary   7/18/2017    Nhi Alexander    MRN: [id number]           Patient Information     Date Of Birth          1985        Visit Information        Provider Department      7/18/2017 4:00 PM Kellie Hill, PhD PA [facility]         Today's Diagnoses     Moderate episode of recurrent major depressive disorder (H)    -  1    Adjustment disorder with anxious mood           Follow-ups after your visit        Your next 10 appointments already scheduled     Jul 28, 2017  2:00 PM CDT   Return Visit with Kellie Hill, PhD PA   [facility]  ([facility])    [facility]  [address]rd [address], [address]  [address]  [facility] [address] [phone]            Jul 31, 2017  3:30 PM CDT   RETURN EXTENDED with Shirley Alfredo MD   [facility] ([facility])    [facility]  [address]  [address]  [facility] [address] [phone]              Who to contact     Please call your clinic at [phone] to:    Ask questions about your health    Make or cancel appointments    Discuss your medicines    Learn about your test results    Speak to your doctor   If you have compliments or concerns about an experience at your clinic, or if you wish to file a complaint, please contact [facility] Patient Relations at [phone] or email us at [email or web address]         Additional Information About Your Visit        MyChart Information     Konbinit is an electronic gateway that provides easy, online access to your medical records. With Procam TV, you can request a clinic appointment, read your test results, renew a prescription or communicate with your care team.     To sign up for Konbinit visit the website at [email or web address]   You will be asked to enter the access code listed below, as well as some personal  information. Please follow the directions to create your username and password.     Your access code is: [email or web address]  Expires: 2017 10:28 AM     Your access code will  in 90 days. If you need help or a new code, please contact your [facility] or call [phone] for assistance.        Care EveryWhere ID     This is your Care EveryWhere ID. This could be used by other organizations to access your [place] medical records  [id number]        Your Vitals Were     Last Period                   2016            Blood Pressure from Last 3 Encounters:   17 136/90   17 117/79   17 130/84    Weight from Last 3 Encounters:   17 109.4 kg (241 lb 3.2 oz)   17 111.4 kg (245 lb 8 oz)   17 111.3 kg (245 lb 4.8 oz)              We Performed the Following     New Patient Visit - Psychiatric diagnostic interview examination ([address])        Primary Care Provider    Physician No Ref-Primary       No address on file        Equal Access to Services     [facility]: Hadii aad ku hadasho Soomaali, waaxda luqadaha, qaybta kaalmada adeegyada, cally castillo . So [facility] [phone].    ATENCIÓN: Si habla español, tiene a helm disposición servicios gratuitos de asistencia lingüística. Llame al [phone].    We comply with applicable federal civil rights laws and [place] laws. We do not discriminate on the basis of race, color, national origin, age, disability sex, sexual orientation or gender identity.            Thank you!     Thank you for choosing [facility]   for your care. Our goal is always to provide you with excellent care. Hearing back from our patients is one way we can continue to improve our services. Please take a few minutes to complete the written survey that you may receive in the mail after your visit with us. Thank you!             Your Updated Medication List - Protect others around you:  Learn how to safely use, store and throw away your medicines at www.disposemymeds.org.          This list is accurate as of: 17 11:59 PM.  Always use your most recent med list.                   Brand Name Dispense Instructions for use Diagnosis    acetaminophen 325 MG tablet    TYLENOL    60 tablet    Take 2 tablets (650 mg) by mouth every 6 hours as needed for mild pain or fever    S/P  section       ibuprofen 600 MG tablet    ADVIL/MOTRIN    60 tablet    Take 1 tablet (600 mg) by mouth every 6 hours    S/P  section       PRENATAL VITAMIN PO      Take by mouth daily        sertraline 50 MG tablet    ZOLOFT    30 tablet    Take 1/2 tablet (25 mg) for 1-2 weeks, then increase to 1 tablet orally daily    Anxiety

## 2017-07-19 ENCOUNTER — LACTATION ENCOUNTER (OUTPATIENT)
Age: 32
End: 2017-07-19

## 2017-07-19 NOTE — LACTATION NOTE
This note was copied from a baby's chart.  D: Met with Nhi. She said she is pumping 8x/d (also power pumping) with volumes decreasing, now to 80ml/d. Her high volume was 150ml/d. She was wondering about other herbs; she has been on fenugreek for one week.  I: Provided support. Discussed concern for supply and that sometimes, despite a mom's best efforts, she does not get the supply she desired. Mom asked about different herbs; discussed plan to stay on fenugreek a little longer. I gave her a new herb handout of information.   A: Mom with low supply wanting to make more milk.  P: Will continue to provide lactation support.   Katerina Rodriguez, RNC, IBCLC

## 2017-07-20 ENCOUNTER — TELEPHONE (OUTPATIENT)
Dept: OBGYN | Facility: CLINIC | Age: 32
End: 2017-07-20

## 2017-07-20 NOTE — TELEPHONE ENCOUNTER
Spoke to Judith to let he know her urine culture was negative,no signs of infection..Pt indicated understanding and agreed with plan.

## 2017-07-20 NOTE — TELEPHONE ENCOUNTER
----- Message from Shirley Alfredo MD sent at 7/19/2017  2:39 PM CDT -----  Regarding: Normal urine culture  Please let patient know that her urine culture was negative for signs of infection.  Thanks, Dr. Alfredo

## 2017-07-20 NOTE — PROGRESS NOTES
Gynecology Visit Note  7/17/17    Reason for visit: Concerns after delivery    S: Patient is a 31 yo  s/p PCCS at 24w6d for preeclampsia with severe features and fetus with asymmetric IUGR.  She was treated with Magnesium for 24 hours postpartum and then discharged with plan for f/u BP.  Patient was then seen in ED POD#5 with elevated BP and was started on Procardia 30 XL daily.  She started this but then was found to have continued elevated blood pressure the following day and as such her dose was increased to 60 mg XL daily.  2 weeks later patient had improved BP and her dose was cut down to 30 mg XL.  She has subsequently discontinued her Procardia entirely about a week ago.    Patient has specific concerns today she would like to discuss.  Of note, patient is tearful through the entirety of our visit and discussion today.  1) Patient states she had bleeding that lasted for maybe a couple weeks after delivery that then ended.  2 days ago she had onset of bright red bleeding that is similar to her prior periods, changing a pad every 2 hours, not soaked, just for comfort.  She has cramping similar to a period.  She is wondering if this really is her period as she thought it would be a while before this came back  2) Patient also complaining of discomfort/pressure in her lower abdomen when urinating.  It starts before and after she urinates, not during.  She denies dysuria or flank pain.  No fevers/chills.  Just wondering if still healing or if there is anything she should be worrying about.  3) Baby in NICU: Patient's daughter is having a very difficult course in the NICU which was not unexpected given IUGR and early gestational age at delivery.  Patient very tearful about this and states she is trying to stay strong but hard because she is incredibly anxious about everything.  She states she can't stop her mind from thinking and this prevents her from sleeping.  She is wondering if there is anything she can  "do about this as she feels she needs to address these concerns.  She is open to both medical and talk therapy today.    O:  Vitals:    07/17/17 1453   BP: 136/90   Pulse: 95   Weight: 109.4 kg (241 lb 3.2 oz)   Height: 1.676 m (5' 6\")     General: NAD, A&Ox3  Resp: Non-labored breathing    A/P: 31 yo  presents for acute post-partum visit to discuss specific concerns  1) Bleeding: Patient's bleeding pattern and timing is likely c/w resumption of menses.  Given early gestational age at delivery and low milk production this is not necessarily unexpected and we talked about monitoring her bleeding and seeing if the bleeding course is c/w period.  If not, she should notify us.  2) Urinary discomfort: Will get UA/UCx today.  Sounds most c/w healing process but will ensure no infection  3) Anxiety/sleep disturbance/Mood changes: Discussed with patient given events surrounding her delivery and baby in NICU, not unexpected for her to have mood changes.  That being said, she does seem to have symptoms greater than just typical postpartum blues or anxiety, and I did discuss with her that I thought treatment would benefit her.  Discussed typical use of SSRI in postpartum period to assist with symptoms even to address anxiety as would not recommend benzos with breastfeeding.  Patient desires to try this.  Given Rx for Zoloft titration.  Patient also very open to talk therapy and given urgent referral to Kellie Hill with psychology.  3) Patient to RTC in 2 weeks for mood check, postpartum visit, contraception discussion, patient understands and is agreeable with this plan    Shirley Alfredo MD     "

## 2017-07-21 DIAGNOSIS — O92.79 LACTATION DISORDER, DELIVERED: Primary | ICD-10-CM

## 2017-07-21 RX ORDER — METOCLOPRAMIDE 10 MG/1
10 TABLET ORAL 3 TIMES DAILY
Qty: 42 TABLET | Refills: 0 | Status: SHIPPED | OUTPATIENT
Start: 2017-07-21 | End: 2017-08-04

## 2017-07-24 NOTE — PROGRESS NOTES
Name:  Nhi Alexander  Mrn: [id number]  Date of visit: 7/18/2017    OUTPATIENT PSYCHOLOGICAL ASSESSMENT VISIT    REFERRAL SOURCE:  Shirley Alfredo MD, [facility]    At initiation of visit provider presented    Limits of confidentiality    Risks and benefits of treatment    The goals and procedures of the visit    IDENTIFYING INFORMATION: Nhi Alexander is a 32-yo , woman presenting with difficulty coping with her baby s medical conditions. (Yohana, Born 6/14/17 at 24wks and 6days)    History of Present Illness:  Ms. Alexander reported that she and her  had been attempting to conceive for 4 yrs prior to this pregnancy achieved via IVF.  She stated she still has not fully reconciled the plan to engage in IVF,  I really didn t want it.   She stated that the pregnancy was difficult with severe nausea and vomiting for the first trimester, then severe migraines and pre-eclampsia diagnosed at 18wks.  She was hospitalized at 24 weeks and  on lock down until they took her [baby].   She has much worry around her baby who remains in the ICU and is  grilling the doctors  involved in her care while her  continues to work fulltime and he maintains,  she will be fine.   In the meantime Ms. Alexander stated  all I do is cry .  She is uncertain when depression began as the process of infertility treatment was emotionally difficult for her, she believes depression may have initiated in 2014.  During IVF she engaged in 4 psychotherapy visits with focus on communication with her , and coping with anxiety and mood.  She has been prescribed sertraline by the referring provider but is reluctant to take it as she believes it may negatively impact her milk supply which is already quite low.      Social History: Born in [place] and raised in the [place] with 4 siblings.  She is the 4th born child.  Her parents  when she was 14yo and she has had not communication with her dad  since that time.  Her mom continues to live in [place] and they are  really close.   She is also close with her oldest sister (living in [facility] [facility]) whose partner is quite ill.  Ms. Alexander  in  and this was their first pregnancy conceived via IVF.  She lives with her  in their own house in [place].  She reports a close group of girlfriends, however, many are either engaged in fertility treatments or currently pregnant.     Ms. Alexander earned a Bachelors degree in Marketing and works fulltime as a software .      Abuse and Trauma History:  Time did not permit for review.      Sexual and relationship history: Time did not permit for review.      Family Mental health History: Dad, oldest brother and one sister have had chemical dependency problems.  Dad estranged from family.  Brother  not normal and can t function.   Sister  after overdosing at 31yo.      Mental health History:  18yo - 1 session therapy focused on coping with family CD difficulties.  Early  - 4 visits psychotherapy focused on coping with emotions around IVF and communication with .  Denied history of psychopharmacotherapy.      Current Psychotropic Medications: Prescribed sertraline and uncertain whether she will initiate.       Substance Use History:    Alcohol - Denied use and history of problems around use.   Drugs - Denied current and past use  Tobacco - Denied current and past use  Caffeine - 1cup coffee per day     Medical History:  Endometriosis and affecting fallopian tubes, diagnosed in  with infertilty. Achieved pregnancy via IVF.  See EMR.    Past Medical History:   Diagnosis Date     Abdominal pain, unspecified abdominal location 2009     Problem list name updated by automated process. Provider to review     CARDIOVASCULAR SCREENING; LDL GOAL LESS THAN 160 10/31/2010     Chest pain 3/11/2015    feels like chest wall pain,       Dysmenorrhea      Hypertension      Infertility,  female      Papanicolaou smear of vagina with low grade squamous intraepithelial lesion (LGSIL) 12/8/2008       Psychological Symptoms:  Depressed mood and anhedonia initiating during fertility treatments.  Loss of appetite and forcing self to eat.  Initial insomnia.  Guilt with belief that baby s medical situation is  my fault  and that she is causing distress to her  due to female factor infertility.  Able to feel energy when in NICU, otherwise  I just sit.   Self esteem low.  Anxiety focused on baby s health and worry that she won t be able to have another child.   I m hyper obsessed with having another baby and doing it right.     Denied suicidal or homicidal ideation intent or plan.      Mental Status: Presented as casually dressed and groomed.  Affect was dysphoric and she was tearful.  Speech was of normal tone rate and volume.  Eye contact was within normal limits. Formal cognitive assessment was not conducted.  Ms. Alexander was oriented to time, person, place and situation. Recent and remote memory appeared intact. No concentration difficulties evident.  Fund of knowledge not assessed but appeared consistent with educational achievement. Judgment appeared good.   Strengths: education, housing, job.      Multiaxial Diagnoses:   Axis I:  MDD recurrent moderate; Adjustment disorder with anxiety (R/O anxiety disorder)  Axis II: Deferred   Axis III:  Endometriosis; infertility; pregnancy via IVF, baby in ICU  Axis IV:  None   Axis V: GAF = 59 (present)    Impressions & Plan:  Nhi Alexander is a 32-year-old, , woman who is presenting with depressed mood and worry in the context of 4 years of infertility, pregnancy achieved via IVF, and baby born at 25wks (June 14, 2017).  History notable for severe Chemical Dependency in 2 siblings and dad (estranged) with one sibling death via overdose.  She is spending all her time in the ICU with baby while  continues to work fulltime with belief that baby   is in good hands and will be fine.   Ms. Alexander is reporting close friends, however, they all seem to be connections made through infertility networks and are in stages of pregnancy or fertility treatments, thus she has difficulty reaching out to them.  She is reporting a supportive mother.  She has much guilt around female factor infertility and the condition of her baby as well as the effects on her .  She is stating a lack of readiness for  resources  but openness to follow-up psychotherapy visits.  She was prescribed Zoloft but has not initiated due to  nervousness  regarding effects on her milk supply.  She was encouraged to discuss this further with medical providers and lactation specialists.     Psychotherapy medically necessary and Ms Alexander was agreeable with this plan and expressed interest in working with this provider.  She understands how to schedule follow up visits.      Total time spent =55 minutes psychological assessment

## 2017-07-28 ENCOUNTER — OFFICE VISIT (OUTPATIENT)
Dept: PSYCHOLOGY | Facility: CLINIC | Age: 32
End: 2017-07-28
Attending: OBSTETRICS & GYNECOLOGY
Payer: COMMERCIAL

## 2017-07-28 DIAGNOSIS — F43.22 ADJUSTMENT DISORDER WITH ANXIOUS MOOD: ICD-10-CM

## 2017-07-28 DIAGNOSIS — F33.1 MODERATE EPISODE OF RECURRENT MAJOR DEPRESSIVE DISORDER (H): Primary | ICD-10-CM

## 2017-07-28 NOTE — MR AVS SNAPSHOT
After Visit Summary   2017    Nhi Alexander    MRN: [id number]           Patient Information     Date Of Birth          1985        Visit Information        Provider Department      2017 2:00 PM Kellie Hill, PhD  [facility]         Today's Diagnoses     Moderate episode of recurrent major depressive disorder (H)    -  1    Adjustment disorder with anxious mood           Follow-ups after your visit        Your next 10 appointments already scheduled     2017  3:30 PM CDT   RETURN EXTENDED with Shirley Alfredo MD   [facility] ([facility])    [facility] [address]  [address]  [facility] [id number] [phone]              Who to contact     Please call your clinic at [phone] to:    Ask questions about your health    Make or cancel appointments    Discuss your medicines    Learn about your test results    Speak to your doctor   If you have compliments or concerns about an experience at your clinic, or if you wish to file a complaint, please contact [facility] Patient Relations at [phone] or email us at [email or web address]         Additional Information About Your Visit        MyChart Information     [employer] is an electronic gateway that provides easy, online access to your medical records. With [employer], you can request a clinic appointment, read your test results, renew a prescription or communicate with your care team.     To sign up for [employer] visit the website at [email or web address]   You will be asked to enter the access code listed below, as well as some personal information. Please follow the directions to create your username and password.     Your access code is: [email or web address]  Expires: 2017 10:28 AM     Your access code will  in 90 days. If you need help or a new code, please contact your [facility]  [place] [facility] or call [phone] for assistance.        Care EveryWhere ID     This is your Care EveryWhere ID. This could be used by other organizations to access your [facility] medical records  [id number]        Your Vitals Were     Last Period                   12/22/2016            Blood Pressure from Last 3 Encounters:   07/17/17 136/90   06/30/17 117/79   06/21/17 130/84    Weight from Last 3 Encounters:   07/17/17 109.4 kg (241 lb 3.2 oz)   06/19/17 111.4 kg (245 lb 8 oz)   06/18/17 111.3 kg (245 lb 4.8 oz)              We Performed the Following     Individual Psychotherapy - Psychotherapy with pt and/or family present 45 mins [38-52 mins] (80122)        Primary Care Provider    Physician No Ref-Primary       No address on file        Equal Access to Services     GREG ZULETA : Momo Conn, wajudi alas, myke chunalcole mcmillan, cally castillo . So [facility] [phone].    ATENCIÓN: Si habla español, tiene a helm disposición servicios gratuitos de asistencia lingüística. Rahul al [phone].    We comply with applicable federal civil rights laws and [place] laws. We do not discriminate on the basis of race, color, national origin, age, disability sex, sexual orientation or gender identity.            Thank you!     Thank you for choosing [facility]   for your care. Our goal is always to provide you with excellent care. Hearing back from our patients is one way we can continue to improve our services. Please take a few minutes to complete the written survey that you may receive in the mail after your visit with us. Thank you!             Your Updated Medication List - Protect others around you: Learn how to safely use, store and throw away your medicines at www.disposemymeds.org.          This list is accurate as of: 7/28/17  4:45 PM.  Always use your most recent med list.                   Brand Name Dispense Instructions  for use Diagnosis    acetaminophen 325 MG tablet    TYLENOL    60 tablet    Take 2 tablets (650 mg) by mouth every 6 hours as needed for mild pain or fever    S/P  section       ibuprofen 600 MG tablet    ADVIL/MOTRIN    60 tablet    Take 1 tablet (600 mg) by mouth every 6 hours    S/P  section       metoclopramide 10 MG tablet    REGLAN    42 tablet    Take 1 tablet (10 mg) by mouth 3 times daily for 14 days    Lactation disorder, delivered       PRENATAL VITAMIN PO      Take by mouth daily        sertraline 50 MG tablet    ZOLOFT    30 tablet    Take 1/2 tablet (25 mg) for 1-2 weeks, then increase to 1 tablet orally daily    Anxiety

## 2017-07-28 NOTE — PROGRESS NOTES
"  Name:  Nhi Alexander  Mrn: [id number]  Date of visit: 7/28/2017    PSYCHOLOGY OUTPATIENT VISIT NOTE       PRESENTING PROBLEMS/SYMPTOMS:  Depressed mood and anhedonia initiating during fertility treatments.  Loss of appetite and forcing self to eat.  Initial insomnia.  Guilt with belief that baby s medical situation is  my fault  and that she is causing distress to her  due to female factor infertility.  Able to feel energy when in NICU, otherwise lethargic and  I just sit.   Self esteem low.  Anxiety focused on baby s health and worry that she won t be able to have another child.   I m hyper obsessed with having another baby and doing it right.       INTERVENTION AND RESPONSE:  Cognitive Behavioral therapy, individual.  This is patient's first psychotherapy visit following the initial psychological assessment.  Patient presented with continued distress around baby in NICU with \"up and down\" medical status.  Exacerbated by \"repossession\" threats around her breast pump by insurance etc.  Also no call from [facility] re extra charges.  Sis in law and 2 children \"vacationing\" 2 wks at her home and bringing kids to NICU to see baby.  Feeling that H and his family do no not understand her emotions and feeling alone in coping with baby's health, \"I feel everything and he protects himself.\"  Example of her cat dying and H saying \"she will be fine.\"  \"I'm dealing with all this myself.\"  Trying to talk with H and shaking uncontrollably and crying.  \"I'm obligated to make it ok for him and his family,\" and exhausting herself in this effort.  Discussed, importance of self care, option to ask inlaws to leave, stay in hotel, stay with mom.  Plan to stay with mom.  Pt stated need for more leave time from work, unprepared to be able to control her emotions throughout the work days, tears erupting regularly and without warning.  Psychologist voiced agreement with need for more time off work and with completing any paperwork in " "this regard.    Complete tx plan.  ASSESSMENT: Pt quite distressed and tearful throughout visit.  Clearly questioning her own truth based on her 's behavior, but able to recognize what is true for her with provider's support.  Pt wondering if she is \"crazy\" because she is upset that her H and in-laws seem unconcerned about baby and focused on vacationing.  Able to hear from therapist that their behavior is inconsistent with the gravity of baby's condition and her own emotional state.  Cultural issues (H ) as well and family issues apparent.   Will be important to engage in treatment planning at upcoming visit.    Mental Status Assessment:  Appearance:   Appropriate   Eye Contact:   Good   Psychomotor Behavior: Normal   Attitude:   Cooperative   Orientation:   All  Speech   Rate / Production: Normal    Volume:  Normal   Mood:    Anxious  Sad   Thought Content:  Clear   Thought Form:  Coherent  Logical   Insight:    Fair     DIAGNOSIS:  MDD recurrent moderate to severe; adjustment disorder with anxiety  PLAN:    Patient to schedule followup visit.       Total time spent equals 50 minutes, individual psychotherapy.             "

## 2017-07-31 ENCOUNTER — OFFICE VISIT (OUTPATIENT)
Dept: OBGYN | Facility: CLINIC | Age: 32
End: 2017-07-31
Attending: OBSTETRICS & GYNECOLOGY
Payer: COMMERCIAL

## 2017-07-31 VITALS
WEIGHT: 240.7 LBS | HEIGHT: 66 IN | HEART RATE: 83 BPM | BODY MASS INDEX: 38.68 KG/M2 | SYSTOLIC BLOOD PRESSURE: 121 MMHG | DIASTOLIC BLOOD PRESSURE: 86 MMHG

## 2017-07-31 DIAGNOSIS — F39 MOOD DISORDER (H): ICD-10-CM

## 2017-07-31 DIAGNOSIS — Z98.891 S/P CESAREAN SECTION: ICD-10-CM

## 2017-07-31 PROBLEM — O09.02 ENCOUNTER FOR SUPERVISION OF HIGH-RISK PREGNANCY WITH HISTORY OF INFERTILITY IN SECOND TRIMESTER: Status: RESOLVED | Noted: 2017-02-14 | Resolved: 2017-07-31

## 2017-07-31 PROBLEM — E55.9 VITAMIN D INSUFFICIENCY: Status: RESOLVED | Noted: 2017-02-17 | Resolved: 2017-07-31

## 2017-07-31 PROCEDURE — 99212 OFFICE O/P EST SF 10 MIN: CPT | Mod: ZF

## 2017-07-31 NOTE — LETTER
7/31/2017       RE: Nhi Alexander  [address]  [place] [address]     Dear Colleague,    Thank you for referring your patient, Nhi Alexander, to the [facility]. Please see a copy of my visit note below.    Postpartum Visit Note  7/31/17    S: Patient is a 31 yo  s/p PCCS at 24w6d for preeclampsia with severe features and fetus with asymmetric IUGR.  Nhi was recently seen for an acute visit after having some concerns 2 weeks ago and is here for full postpartum visit today.    Today, patient states she is feeling better than 2 weeks ago but each day has it's challenged.  Yohana is doing ok in the NICU but continues to have issues with lung development and recurring infections with her breathing tube.  She has found meeting with Kellie Hill to be extremely helpful and she plans to continue her visits with her moving forward.  She did not start Zoloft as we discussed at her last visit as she was too anxious about how it would affect her milk.  She feels that the talk therapy is good for her and would prefer to solely continue with this at this time.  She has also decided to take all 12 weeks of FMLA which has decreased her anxiety and stress about having to go back to work.  Patient states she does get some occasional burning/sharp pains on the bilateral edges of her incision and still has numbness in her lower abdomen as well.  Aside from that no real pain.  She did have what she believes is a period on 7/15/17 as it acted just like a period although slightly longer than her prior cycles, lasting around 6 days.  She is tolerating regular diet.  She continues to occasionally feel some pressure with urination, but improved since her visit 2 weeks ago and no longer with every void.  She denies issues with her BM.  She has not had intercourse since delivery.  She wants to discuss plans for another pregnancy and how  "long to wait for another given they had to do IVF to conceive this time.  She wondering if there is someone she should meet with before attempting another pregnancy to discuss her history with this pregnancy and get more information on whether considering another pregnancy is even a good option.      ================================================================  ROS: 10 point ROS neg other than the symptoms noted above in the HPI.     EXAM:  /86  Pulse 83  Ht 1.676 m (5' 6\")  Wt 109.2 kg (240 lb 11.2 oz)  LMP 2016  BMI 38.85 kg/m2    General: healthy, alert and no distress  Psych: positive for sleep disturbance, anxiety and depression  PHQ-9: 20, PATRICA-7: 16  Breasts:  Lactating, Nipples intact with no lesions, Non-tender and No S/S of yeast or mastitis  Abdomen: Benign, Soft, flat, non-tender, No masses, organomegaly and Diastasis less than 1-2 FB  Incision:  well healed and dry and intact   Vulva:  Normal genitalia and Bartholin's, Urethra, [place]'s normal  Vagina:  normal with good muscle tone  Cervix:  Nulliparous,, no lesions and pink, moist, closed, without lesion or CMT.    Uterus:  fully involuted and non-tender    Adnexa:  Within normal limits and No masses, nodularity, tenderness    ASSESSMENT:   Encounter Diagnoses   Name Primary?     Routine postpartum follow-up Yes     Mood disorder (H)      S/P  section       Normal postpartum exam after Primary Classical  section for preeclampsia with severe features and asymmetric IUGR    PLAN:  1) Normal breast, abdominal and pelvic exam today  2) Mood disorder: Patient struggling with depression and anxiety in postpartum period secondary to her daughter's health status in NICU.  Patient is accessing therapy with Dr. Kellie Hill and finds this very beneficial.  She continues to want this intervention.  She declines medical therapy but states if things get worse she will try it.  She is more concerned about effects on her milk and " wanting Yohana to be able to have it when able.  PHQ-9 is 20 today, PATRICA-7 is 16.  Recommend continued close follow-up with Dr. Hill and to come back if she feels symptoms are worsening and she needs to start medications.  3) History of preeclampsia with severe features in second trimester of pregnancy: Discussed with patient that given her history and onset of preeclampsia with this recent pregnancy, she is at high risk of developing this again in a subsequent pregnancy.  We discussed consideration of ASA in future pregnancy.  We also discussed that would recommend she have preconception consult with [facility] prior to considering plans for future pregnancy so she and  are aware of potential risks and to set plan for management during a subsequent pregnancy.  4) History of classical  section: Discussed with patient would delay pregnancy for 18-24 months post her delivery.  She could consider starting to try for pregnancy 12 months post-delivery, but as she will likely require IVF, would recommend delaying attempt at this again to the 18 month min.  5) Infertility: Patient required IVF secondary to endometriosis for this pregnancy.  That being said, we did discuss importance of not becoming pregnant and the potential for pregnancy given onset of her menses already and that sometimes people can spontaneously conceive even after needing prior ART.  Discussed options for progesterone options with patient given her concerns for milk supply already and patient is hesitant to take anything that could affect her supply.  She will plan condoms for now and if discontinues pumping/breastfeeding, could consider transition to hormonal method which would also assist with endometriosis.  She will let us know if she desires something else.  6) RTC as needed with any concerns moving forward or contact us when ready to start thinking about another pregnancy so we can place referral to [facility] for preconception counseling.   Patient understands and is agreeable with this plan.    Shirley Alfredo MD    Again, thank you for allowing me to participate in the care of your patient.      Sincerely,    Shirley Alfredo MD

## 2017-07-31 NOTE — LETTER
Date:August 2, 2017      Patient was self referred, no letter generated. Do not send.        [facility] Health Information

## 2017-07-31 NOTE — MR AVS SNAPSHOT
After Visit Summary   2017    Nhi Alexander    MRN: [id number]           Patient Information     Date Of Birth          1985        Visit Information        Provider Department      2017 3:30 PM Shirley Alfredo MD [facility]        Today's Diagnoses     Routine postpartum follow-up    -  1    Mood disorder (H)        S/P  section           Follow-ups after your visit        Your next 10 appointments already scheduled     Aug 02, 2017  8:00 AM CDT   Return Visit with Kellie Hill, PhD    [facility]  [facility]    [facility]  [address] [facility], [facility] [address]  [address]  [facility] [id number] [phone]              Who to contact     Please call your clinic at [phone] to:    Ask questions about your health    Make or cancel appointments    Discuss your medicines    Learn about your test results    Speak to your doctor   If you have compliments or concerns about an experience at your clinic, or if you wish to file a complaint, please contact [facility] Patient Relations at [phone] or email us at [email or web address]         Additional Information About Your Visit        MyChart Information     [employer] gives you secure access to your electronic health record. If you see a primary care provider, you can also send messages to your care team and make appointments. If you have questions, please call your primary care clinic.  If you do not have a primary care provider, please call [phone] and they will assist you.      [employer] is an electronic gateway that provides easy, online access to your medical records. With [employer] you can request a clinic appointment, read your test results, renew a prescription or communicate with your care team.     To access your existing account, please contact your [facility] or call  "[phone] for assistance.        Care EveryWhere ID     This is your Care EveryWhere ID. This could be used by other organizations to access your [place] medical records  [id number]        Your Vitals Were     Pulse Height Last Period BMI (Body Mass Index)          83 1.676 m (5' 6\") 12/22/2016 38.85 kg/m2         Blood Pressure from Last 3 Encounters:   07/31/17 121/86   07/17/17 136/90   06/30/17 117/79    Weight from Last 3 Encounters:   07/31/17 109.2 kg (240 lb 11.2 oz)   07/17/17 109.4 kg (241 lb 3.2 oz)   06/19/17 111.4 kg (245 lb 8 oz)              Today, you had the following     No orders found for display       Primary Care Provider    Physician No Ref-Primary       No address on file        Equal Access to Services     GREG ZULETA : Hadii bernardo Conn, wajudi alas, myke chunalcole mcmillan, cally castillo . So [facility] [phone].    ATENCIÓN: Si habla español, tiene a helm disposición servicios gratuitos de asistencia lingüística. Rahul al [phone].    We comply with applicable federal civil rights laws and [place] laws. We do not discriminate on the basis of race, color, national origin, age, disability sex, sexual orientation or gender identity.            Thank you!     Thank you for choosing [facility]  for your care. Our goal is always to provide you with excellent care. Hearing back from our patients is one way we can continue to improve our services. Please take a few minutes to complete the written survey that you may receive in the mail after your visit with us. Thank you!             Your Updated Medication List - Protect others around you: Learn how to safely use, store and throw away your medicines at www.disposemymeds.org.          This list is accurate as of: 7/31/17 11:59 PM.  Always use your most recent med list.                   Brand Name Dispense Instructions for use Diagnosis    acetaminophen 325 MG tablet    " TYLENOL    60 tablet    Take 2 tablets (650 mg) by mouth every 6 hours as needed for mild pain or fever    S/P  section       ibuprofen 600 MG tablet    ADVIL/MOTRIN    60 tablet    Take 1 tablet (600 mg) by mouth every 6 hours    S/P  section       metoclopramide 10 MG tablet    REGLAN    42 tablet    Take 1 tablet (10 mg) by mouth 3 times daily for 14 days    Lactation disorder, delivered       PRENATAL VITAMIN PO      Take by mouth daily        sertraline 50 MG tablet    ZOLOFT    30 tablet    Take 1/2 tablet (25 mg) for 1-2 weeks, then increase to 1 tablet orally daily    Anxiety

## 2017-08-01 NOTE — PROGRESS NOTES
Postpartum Visit Note  7/31/17    S: Patient is a 31 yo  s/p PCCS at 24w6d for preeclampsia with severe features and fetus with asymmetric IUGR.  Nhi was recently seen for an acute visit after having some concerns 2 weeks ago and is here for full postpartum visit today.    Today, patient states she is feeling better than 2 weeks ago but each day has it's challenged.  Yohana is doing ok in the NICU but continues to have issues with lung development and recurring infections with her breathing tube.  She has found meeting with Kellie Hill to be extremely helpful and she plans to continue her visits with her moving forward.  She did not start Zoloft as we discussed at her last visit as she was too anxious about how it would affect her milk.  She feels that the talk therapy is good for her and would prefer to solely continue with this at this time.  She has also decided to take all 12 weeks of FMLA which has decreased her anxiety and stress about having to go back to work.  Patient states she does get some occasional burning/sharp pains on the bilateral edges of her incision and still has numbness in her lower abdomen as well.  Aside from that no real pain.  She did have what she believes is a period on 7/15/17 as it acted just like a period although slightly longer than her prior cycles, lasting around 6 days.  She is tolerating regular diet.  She continues to occasionally feel some pressure with urination, but improved since her visit 2 weeks ago and no longer with every void.  She denies issues with her BM.  She has not had intercourse since delivery.  She wants to discuss plans for another pregnancy and how long to wait for another given they had to do IVF to conceive this time.  She wondering if there is someone she should meet with before attempting another pregnancy to discuss her history with this pregnancy and get more information on whether considering another pregnancy is even a good option.   "    ================================================================  ROS: 10 point ROS neg other than the symptoms noted above in the HPI.     EXAM:  /86  Pulse 83  Ht 1.676 m (5' 6\")  Wt 109.2 kg (240 lb 11.2 oz)  LMP 2016  BMI 38.85 kg/m2    General: healthy, alert and no distress  Psych: positive for sleep disturbance, anxiety and depression  PHQ-9: 20, PATRICA-7: 16  Breasts:  Lactating, Nipples intact with no lesions, Non-tender and No S/S of yeast or mastitis  Abdomen: Benign, Soft, flat, non-tender, No masses, organomegaly and Diastasis less than 1-2 FB  Incision:  well healed and dry and intact   Vulva:  Normal genitalia and Bartholin's, Urethra, [place]'s normal  Vagina:  normal with good muscle tone  Cervix:  Nulliparous,, no lesions and pink, moist, closed, without lesion or CMT.    Uterus:  fully involuted and non-tender    Adnexa:  Within normal limits and No masses, nodularity, tenderness    ASSESSMENT:   Encounter Diagnoses   Name Primary?     Routine postpartum follow-up Yes     Mood disorder (H)      S/P  section       Normal postpartum exam after Primary Classical  section for preeclampsia with severe features and asymmetric IUGR    PLAN:  1) Normal breast, abdominal and pelvic exam today  2) Mood disorder: Patient struggling with depression and anxiety in postpartum period secondary to her daughter's health status in NICU.  Patient is accessing therapy with Dr. Kellie Hill and finds this very beneficial.  She continues to want this intervention.  She declines medical therapy but states if things get worse she will try it.  She is more concerned about effects on her milk and wanting Yohana to be able to have it when able.  PHQ-9 is 20 today, PATRICA-7 is 16.  Recommend continued close follow-up with Dr. Hill and to come back if she feels symptoms are worsening and she needs to start medications.  3) History of preeclampsia with severe features in second trimester of " pregnancy: Discussed with patient that given her history and onset of preeclampsia with this recent pregnancy, she is at high risk of developing this again in a subsequent pregnancy.  We discussed consideration of ASA in future pregnancy.  We also discussed that would recommend she have preconception consult with MFM prior to considering plans for future pregnancy so she and  are aware of potential risks and to set plan for management during a subsequent pregnancy.  4) History of classical  section: Discussed with patient would delay pregnancy for 18-24 months post her delivery.  She could consider starting to try for pregnancy 12 months post-delivery, but as she will likely require IVF, would recommend delaying attempt at this again to the 18 month min.  5) Infertility: Patient required IVF secondary to endometriosis for this pregnancy.  That being said, we did discuss importance of not becoming pregnant and the potential for pregnancy given onset of her menses already and that sometimes people can spontaneously conceive even after needing prior ART.  Discussed options for progesterone options with patient given her concerns for milk supply already and patient is hesitant to take anything that could affect her supply.  She will plan condoms for now and if discontinues pumping/breastfeeding, could consider transition to hormonal method which would also assist with endometriosis.  She will let us know if she desires something else.  6) RTC as needed with any concerns moving forward or contact us when ready to start thinking about another pregnancy so we can place referral to MFM for preconception counseling.  Patient understands and is agreeable with this plan.    Shirley Alfredo MD

## 2017-08-05 ENCOUNTER — HEALTH MAINTENANCE LETTER (OUTPATIENT)
Age: 32
End: 2017-08-05

## 2017-08-07 ENCOUNTER — OFFICE VISIT (OUTPATIENT)
Dept: PSYCHOLOGY | Facility: CLINIC | Age: 32
End: 2017-08-07
Payer: COMMERCIAL

## 2017-08-07 DIAGNOSIS — F43.22 ADJUSTMENT DISORDER WITH ANXIOUS MOOD: ICD-10-CM

## 2017-08-07 DIAGNOSIS — F33.1 MODERATE EPISODE OF RECURRENT MAJOR DEPRESSIVE DISORDER (H): Primary | ICD-10-CM

## 2017-08-07 NOTE — MR AVS SNAPSHOT
After Visit Summary   8/7/2017    Nhi Alexander    MRN: [id number]           Patient Information     Date Of Birth          1985        Visit Information        Provider Department      8/7/2017 3:00 PM Kellie Hill, PhD  [facility]         Today's Diagnoses     Moderate episode of recurrent major depressive disorder (H)    -  1    Adjustment disorder with anxious mood           Follow-ups after your visit        Future tests that were ordered for you today     Open Future Orders        Priority Expected Expires Ordered    M Tuberculosis by Quantiferon Routine  8/13/2018 8/6/2017    TB INTRADERMAL TEST Routine  8/13/2018 8/6/2017    XR Chest 1 View Routine 8/6/2017 8/13/2018 8/6/2017            Who to contact     Please call your clinic at [phone] to:    Ask questions about your health    Make or cancel appointments    Discuss your medicines    Learn about your test results    Speak to your doctor   If you have compliments or concerns about an experience at your clinic, or if you wish to file a complaint, please contact [facility] Patient Relations at [phone] or email us at [email or web address]         Additional Information About Your Visit        MyChart Information     [employer] gives you secure access to your electronic health record. If you see a primary care provider, you can also send messages to your care team and make appointments. If you have questions, please call your primary care clinic.  If you do not have a primary care provider, please call [phone] and they will assist you.      [employer] is an electronic gateway that provides easy, online access to your medical records. With [employer], you can request a clinic appointment, read your test results, renew a prescription or communicate with your care team.     To access your existing account, please contact your [facility]  or call [phone] for assistance.        Care EveryWhere ID     This is your Care EveryWhere ID. This could be used by other organizations to access your [place] medical records  [id number]         Blood Pressure from Last 3 Encounters:   07/31/17 121/86   07/17/17 136/90   06/30/17 117/79    Weight from Last 3 Encounters:   07/31/17 109.2 kg (240 lb 11.2 oz)   07/17/17 109.4 kg (241 lb 3.2 oz)   06/19/17 111.4 kg (245 lb 8 oz)              We Performed the Following     Individual Psychotherapy - Psychotherapy with pt and/or family present  60 mins [53+ mins] ([id number])        Primary Care Provider    Physician No Ref-Primary       No address on file        Equal Access to Services     GREG ZULETA : Momo Conn, waaxda luqadaha, qaybta kaalmada deyanira, cally solis. So [facility] [phone].    ATENCIÓN: Si habla español, tiene a helm disposición servicios gratuitos de asistencia lingüística. [facility] [phone].    We comply with applicable federal civil rights laws and [place] laws. We do not discriminate on the basis of race, color, national origin, age, disability sex, sexual orientation or gender identity.            Thank you!     Thank you for choosing [facility]   for your care. Our goal is always to provide you with excellent care. Hearing back from our patients is one way we can continue to improve our services. Please take a few minutes to complete the written survey that you may receive in the mail after your visit with us. Thank you!             Your Updated Medication List - Protect others around you: Learn how to safely use, store and throw away your medicines at www.disposemymeds.org.          This list is accurate as of: 8/7/17  4:31 PM.  Always use your most recent med list.                   Brand Name Dispense Instructions for use Diagnosis    acetaminophen 325 MG tablet    TYLENOL    60 tablet    Take 2 tablets (650 mg)  by mouth every 6 hours as needed for mild pain or fever    S/P  section       ibuprofen 600 MG tablet    ADVIL/MOTRIN    60 tablet    Take 1 tablet (600 mg) by mouth every 6 hours    S/P  section       PRENATAL VITAMIN PO      Take by mouth daily        sertraline 50 MG tablet    ZOLOFT    30 tablet    Take 1/2 tablet (25 mg) for 1-2 weeks, then increase to 1 tablet orally daily    Anxiety

## 2017-08-07 NOTE — PROGRESS NOTES
"  Name:  Nhi Alexander  Mrn: [id number]  Date of visit: 8/7/2017    PSYCHOLOGY OUTPATIENT VISIT NOTE       PRESENTING PROBLEMS/SYMPTOMS:  Depressed mood and anhedonia initiating during fertility treatments.  Loss of appetite and forcing self to eat.  Initial insomnia.  Guilt with belief that baby s medical situation is  my fault  and that she is causing distress to her  due to female factor infertility.  Able to feel energy when in NICU, otherwise lethargic and  I just sit.   Self esteem low.  Anxiety focused on baby s health and worry that she won t be able to have another child.   I m hyper obsessed with having another baby and doing it right.   (baby = Yohana, born at 24wks, 6/14/17)  INTERVENTION AND RESPONSE:  Cognitive Behavioral therapy, individual.  This is patient's first psychotherapy visit following the initial psychological assessment.  Patient presented with update on baby's multiple \"uncommon infections.\"  Unsure how to process this info.  Difficulty with support network, mom, sister, , sis in law with nieces \"vacationing\" at her house. \"I don't ask for help, people don't offer help; zero expectations from sister always.\"  Being independent and \"I don't need a lot of help\" (tearful.)  's \"over optimism,\" denial or over negative.  Difficult to cope with his reponses, his \"silent treatment in retaliation\" for her not wanting 11yo niece to come to hospital, discussion with ID   Feeling \"mad\" at H.  Not choosing to stay with mom, not helpful.  Friends meeting her at hospital, meet for coffee, but not sure how to receive their support.       continued distress around baby in NICU with \"up and down\" medical status.  Exacerbated by \"repossession\" threats around her breast pump by insurance etc.  Also no call from [facility] re extra charges.  Sis in law and 2 children \"vacationing\" 2 wks at her home and bringing kids to NICU to see baby.  Feeling that H and his family do no not understand " "her emotions and feeling alone in coping with baby's health, \"I feel everything and he protects himself.\"  Example of her cat dying and H saying \"she will be fine.\"  \"I'm dealing with all this myself.\"  Trying to talk with H and shaking uncontrollably and crying.  \"I'm obligated to make it ok for him and his family,\" and exhausting herself in this effort.  Discussed, importance of self care, option to ask inlaws to leave, stay in hotel, stay with mom.  Plan to stay with mom.  Pt stated need for more leave time from work, unprepared to be able to control her emotions throughout the work days, tears erupting regularly and without warning.  Psychologist voiced agreement with need for more time off work and with completing any paperwork in this regard.    Will be important to engage in treatment planning at upcoming visit.   ASSESSMENT: Pt quite distressed and tearful throughout visit.  Clearly focused on baby and on minimizing conflict with H and other family around low support and/or emotional distance.  Continuing context:  At 15yo, dad went crazy and parents .  Now questioning her own truth based on her 's behavior around baby's medical situation.  Pt wondering if she is \"crazy\" because she is upset that her H and in-laws seem unconcerned about baby and focused on vacationing.  I feel everything and he protects himself.\"  Example of her cat dying and H saying \"she will be fine.\"  \"I'm dealing with all this myself.\"  Trying to talk with H and shaking uncontrollably and crying.  \"I'm obligated to make it ok for him and his family.\"  Cultural issues (H Rwandan) as well and family issues apparent. Sister's wife just had double mastectomy day after baby born.      Mental Status Assessment:  Appearance:   Appropriate  wearing all black  Eye Contact:   Good   Psychomotor Behavior: Normal   Attitude:   Cooperative   Orientation:   All  Speech   Rate / Production: Normal    Volume:  Normal   Mood:    Anxious  " Sad tearful  Thought Content:  Clear   Thought Form:  Coherent  Logical   Insight:    Fair     DIAGNOSIS:  MDD recurrent moderate to severe; adjustment disorder with anxiety  PLAN:    Patient to schedule followup visit.       Total time spent equals 60 minutes, individual psychotherapy.

## 2017-08-14 LAB — COPATH REPORT: NORMAL

## 2017-09-19 ENCOUNTER — TELEPHONE (OUTPATIENT)
Dept: OBGYN | Facility: CLINIC | Age: 32
End: 2017-09-19

## 2017-09-19 DIAGNOSIS — O92.79 LACTATION DISORDER, DELIVERED: Primary | ICD-10-CM

## 2017-09-19 RX ORDER — METOCLOPRAMIDE 10 MG/1
10 TABLET ORAL 3 TIMES DAILY
Qty: 42 TABLET | Refills: 0 | Status: SHIPPED | OUTPATIENT
Start: 2017-09-19 | End: 2017-10-03

## 2017-09-19 NOTE — TELEPHONE ENCOUNTER
----- Message from Angelique Cason sent at 9/19/2017 11:15 AM CDT -----  Regarding: Pt of Dr. Alfredo calling about breast feeding medication and she needs more and has questions  Contact: [phone]  Pt of Dr. Alfredo calling about breast feeding medication and she needs more and has questions.  Please call pt back at the number listed above    Thank you,    Gem HARPER  Call Ctr    Please DO NOT send this message and/or reply back to sender.  Call Center Representatives DO NOT respond to messages.

## 2017-09-19 NOTE — TELEPHONE ENCOUNTER
Spoke with Nhi who stated she received a prescription for reglan in July but after picking it up from pharmacy decided not to use it and tried 'some other things' but her milk supply remains low so she is asking for another prescription.    Discussed with Dr. Alfredo who agreed with plan. Rx sent.

## 2017-12-13 ENCOUNTER — TELEPHONE (OUTPATIENT)
Dept: OBGYN | Facility: CLINIC | Age: 32
End: 2017-12-13

## 2017-12-13 NOTE — TELEPHONE ENCOUNTER
"Pt called to inquire about medication, domperidone, she has researched that is evidenced to help with lactation supply but is unavailable in [place] but can be sent by [place] Provider to [facility]. Is hoping Dr. Alfredo will prescribe this and/or metformin for lactation.     Pt tried reglan in the past which boosted supply for a short period but decreased again when she weaned off the medication.    Patient has appointment with lactation consultant this week. Educated patient about supply/demand, increase fluids PO, \"power\" pumping.    Informed patient I will send request to Dr. Alfredo and get back to her tomorrow.   "

## 2017-12-14 NOTE — TELEPHONE ENCOUNTER
Dr. Alfredo recommends pt meet with lactation for their input and recommendation before she will order any of these medications. Spoke with patient who will meet with lactation and f/u with their recommendation (in writing). Will await this documentation.

## 2018-04-18 ENCOUNTER — OFFICE VISIT (OUTPATIENT)
Dept: PSYCHOLOGY | Facility: CLINIC | Age: 33
End: 2018-04-18
Payer: COMMERCIAL

## 2018-04-18 DIAGNOSIS — F33.1 MODERATE EPISODE OF RECURRENT MAJOR DEPRESSIVE DISORDER (H): Primary | ICD-10-CM

## 2018-04-18 DIAGNOSIS — F43.22 ADJUSTMENT DISORDER WITH ANXIOUS MOOD: ICD-10-CM

## 2018-04-18 NOTE — MR AVS SNAPSHOT
After Visit Summary   4/18/2018    Nhi Alexander    MRN: [id number]           Patient Information     Date Of Birth          1985        Visit Information        Provider Department      4/18/2018 8:00 AM Kellie Hill, PhD  [facility]         Today's Diagnoses     Moderate episode of recurrent major depressive disorder (H)    -  1    Adjustment disorder with anxious mood           Follow-ups after your visit        Your next 10 appointments already scheduled     Jun 06, 2018  2:00 PM CDT   Return Visit with Kellie Hill, PhD PA   [facility]  ([facility])    [facility]  [address]  [facility] [address] [phone]            Jun 22, 2018 12:00 PM CDT   Return Visit with Kellie Hill, PhD PA   [facility]  ([facility])    [facility]  [address]  [address]  [facility] [address] [phone]            Jul 09, 2018 12:00 PM CDT   Return Visit with Kellie Hill, PhD PA   [facility]  ([facility])    [facility]  [address]  [address]  [facility] [address] [phone]            Jul 23, 2018 12:00 PM CDT   Return Visit with Kellie Hill, PhD    [facility]  ([facility])    [facility]  [address]  [address]  [facility] [address] [phone]            Aug 17, 2018 11:00 AM CDT   Return Visit with Kellie Hill, PhD    [facility]  ([facility])    [facility]  [address]  [address]  [facility] [address] [phone]              Who to contact     Please call your clinic at [phone] to:    Ask questions about your health    Make or cancel appointments    Discuss your medicines    Learn about your  test results    Speak to your doctor            Additional Information About Your Visit        [employer] Information     [employer] gives you secure access to your electronic health record. If you see a primary care provider, you can also send messages to your care team and make appointments. If you have questions, please call your primary care clinic.  If you do not have a primary care provider, please call [phone] and they will assist you.      [employer] is an electronic gateway that provides easy, online access to your medical records. With [employer], you can request a clinic appointment, read your test results, renew a prescription or communicate with your care team.     To access your existing account, please contact your [facility] or call [phone] for assistance.        Care EveryWhere ID     This is your Care EveryWhere ID. This could be used by other organizations to access your [place] medical records  [id number]         Blood Pressure from Last 3 Encounters:   07/31/17 121/86   07/17/17 136/90   06/30/17 117/79    Weight from Last 3 Encounters:   07/31/17 109.2 kg (240 lb 11.2 oz)   07/17/17 109.4 kg (241 lb 3.2 oz)   06/19/17 111.4 kg (245 lb 8 oz)              We Performed the Following     Individual Psychotherapy - Psychotherapy with pt and/or family present  60 mins [53+ mins] ([id number])        Primary Care Provider Fax #    Physician No Ref-Primary [phone]       No address on file        Equal Access to Services     GREG ZULETA : Hadii bernardo garcia haddericko Soheribertoali, waaxda luqadaha, qaybta kaalmada adeegyada, cally castillo . So [facility] [phone].    ATENCIÓN: Si habla español, tiene a helm disposición servicios gratuitos de asistencia lingüística. Llame al [phone].    We comply with applicable federal civil rights laws and [place] laws. We do not discriminate on the basis of race, color, national origin, age, disability, sex, sexual  orientation, or gender identity.            Thank you!     Thank you for choosing [facility]   for your care. Our goal is always to provide you with excellent care. Hearing back from our patients is one way we can continue to improve our services. Please take a few minutes to complete the written survey that you may receive in the mail after your visit with us. Thank you!             Your Updated Medication List - Protect others around you: Learn how to safely use, store and throw away your medicines at www.disposemymeds.org.          This list is accurate as of 18 11:59 PM.  Always use your most recent med list.                   Brand Name Dispense Instructions for use Diagnosis    acetaminophen 325 MG tablet    TYLENOL    60 tablet    Take 2 tablets (650 mg) by mouth every 6 hours as needed for mild pain or fever    S/P  section       ibuprofen 600 MG tablet    ADVIL/MOTRIN    60 tablet    Take 1 tablet (600 mg) by mouth every 6 hours    S/P  section       PRENATAL VITAMIN PO      Take by mouth daily        sertraline 50 MG tablet    ZOLOFT    30 tablet    Take 1/2 tablet (25 mg) for 1-2 weeks, then increase to 1 tablet orally daily    Anxiety

## 2018-04-24 NOTE — PROGRESS NOTES
"  Name:  Nhi Alexander  Mrn: [id number]  Date of visit: 4/18/2018    PSYCHOLOGY OUTPATIENT VISIT NOTE       PRESENTING PROBLEMS/SYMPTOMS:  Depressed mood and anhedonia initiating during fertility treatments.  Loss of appetite and forcing self to eat.  Initial insomnia.  Guilt with belief that baby s medical situation is  my fault  and that she is causing distress to her  due to female factor infertility.  Able to feel energy when in NICU, otherwise lethargic and  I just sit.   Self esteem low.  Anxiety focused on baby s health and worry that she won t be able to have another child.   I m hyper obsessed with having another baby and doing it right.   (baby = Yohana, born at 24wks, 6/14/[birth year]) (last visit 8mos ago)  INTERVENTION AND RESPONSE:  Cognitive Behavioral therapy, individual.  Pt reported that she did not follow-up with therapy because she needed to focus on care for baby, little focus on self care.  Now baby more stable and recognizing \"I can notice me too.\"  Updates over 8mos: Yohana in NICU and came home in Nov, still on O2 at night, supplemental feeding via Gtube, eye surgery scheduled, will be fitted for helmet, staying home with G as her immunie system not ready to be exposed.  H continuing to \"think it will go away, don't worry\" with regards to G's medical problems and pt taking care of all.  H interested in caring for G and \"smitten with her.\"  Still trying to pump, only .5oz and needing to get donor milk, G quite sick with formula.  Difficult talking with friends, all pregnancy and/or have kids, can't relate to their stories.  Angry.  Discussed and supported.  Importance of focus on self in addition to G.  Plan to engage in therapy.    Will be important to engage in treatment planning at upcoming visit.   ASSESSMENT: Pt  tearful intermittently through visit.  Eating - described as healthy with attempts to lose wt (overwt); Sleep - able to sleep when baby sleeping, alternating childcare at night " "with H.  Clearly focused on baby and now aware of need to care for self also.  Describing H as avoidant coping but attached to baby.  Continuing sense of \"doing it alone\" as reported in previous visits (July -Aug 2017, see EMR)  Continuing context:  At 15yo, dad went crazy and parents .  Now questioning her own truth based on her 's behavior around baby's medical situation.  Pt wondering if she is \"crazy\" because she is upset that her H and in-laws seem unconcerned about baby and focused on vacationing.  I feel everything and he protects himself.\"  Example of her cat dying and H saying \"she will be fine.\"  \"I'm dealing with all this myself.\"  Trying to talk with H and shaking uncontrollably and crying.  \"I'm obligated to make it ok for him and his family.\"  Cultural issues (H ) as well and family issues apparent. Sister's wife just had double mastectomy day after baby born.    Mental Status Assessment:  Appearance:   Appropriate   Eye Contact:   Good   Psychomotor Behavior: Normal   Attitude:   Cooperative   Orientation:   All  Speech   Rate / Production: Normal    Volume:  Normal   Mood:    Anxious  Sad tearful  Thought Content:  Clear   Thought Form:  Coherent  Logical   Insight:    Fair     DIAGNOSIS:  MDD recurrent moderate; adjustment disorder with anxiety  PLAN:    Patient to schedule followup visit.       Total time spent equals 60 minutes, individual psychotherapy.             "

## 2018-07-09 ENCOUNTER — OFFICE VISIT (OUTPATIENT)
Dept: PSYCHOLOGY | Facility: CLINIC | Age: 33
End: 2018-07-09
Payer: COMMERCIAL

## 2018-07-09 DIAGNOSIS — F43.22 ADJUSTMENT DISORDER WITH ANXIOUS MOOD: ICD-10-CM

## 2018-07-09 DIAGNOSIS — F33.9 EPISODE OF RECURRENT MAJOR DEPRESSIVE DISORDER, UNSPECIFIED DEPRESSION EPISODE SEVERITY (H): Primary | ICD-10-CM

## 2018-07-09 NOTE — MR AVS SNAPSHOT
After Visit Summary   7/9/2018    Nhi Alexander    MRN: [id number]           Patient Information     Date Of Birth          1985        Visit Information        Provider Department      7/9/2018 12:00 PM Kellie Hill, PhD PA [facility]         Today's Diagnoses     Episode of recurrent major depressive disorder, unspecified depression episode severity (H)    -  1    Adjustment disorder with anxious mood           Follow-ups after your visit        Your next 10 appointments already scheduled     Jul 23, 2018 12:00 PM CDT   Return Visit with Kellie Hill, PhD PA   [facility]  ([facility])    [facility]  [address]  [address] [facility] [address] [phone]            Aug 17, 2018 11:00 AM CDT   Return Visit with Kellie Hill, PhD PA   [facility]  ([facility])    [place] [facility]  [address]  [address] [place] [address] [phone]              Who to contact     Please call your clinic at [phone] to:    Ask questions about your health    Make or cancel appointments    Discuss your medicines    Learn about your test results    Speak to your doctor            Additional Information About Your Visit        [employer] Information     [employer] gives you secure access to your electronic health record. If you see a primary care provider, you can also send messages to your care team and make appointments. If you have questions, please call your primary care clinic.  If you do not have a primary care provider, please call [phone] and they will assist you.      [employer] is an electronic gateway that provides easy, online access to your medical records. With [employer], you can request a clinic appointment, read your test results, renew a prescription or communicate with your care team.     To access your existing account, please  contact your [facility] or call [phone] for assistance.        Care EveryWhere ID     This is your Care EveryWhere ID. This could be used by other organizations to access your [place] medical records  [id number]         Blood Pressure from Last 3 Encounters:   07/31/17 121/86   07/17/17 136/90   06/30/17 117/79    Weight from Last 3 Encounters:   07/31/17 109.2 kg (240 lb 11.2 oz)   07/17/17 109.4 kg (241 lb 3.2 oz)   06/19/17 111.4 kg (245 lb 8 oz)              We Performed the Following     Individual Psychotherapy - Psychotherapy with pt and/or family present  60 mins [53+ mins] ([id number])        Primary Care Provider Fax #    Physician No Ref-Primary [phone]       No address on file        Equal Access to Services     GREG ZULETA : Momo Conn, wajudi alas, myke chunalcole mcmillan, cally castillo . So [facility] [phone].    ATENCIÓN: Si habla español, tiene a helm disposición servicios gratuitos de asistencia lingüística. Rahul al [phone].    We comply with applicable federal civil rights laws and [place] laws. We do not discriminate on the basis of race, color, national origin, age, disability, sex, sexual orientation, or gender identity.            Thank you!     Thank you for choosing [facility]   for your care. Our goal is always to provide you with excellent care. Hearing back from our patients is one way we can continue to improve our services. Please take a few minutes to complete the written survey that you may receive in the mail after your visit with us. Thank you!             Your Updated Medication List - Protect others around you: Learn how to safely use, store and throw away your medicines at www.disposemymeds.org.          This list is accurate as of 7/9/18  5:17 PM.  Always use your most recent med list.                   Brand Name Dispense Instructions for use Diagnosis     acetaminophen 325 MG tablet    TYLENOL    60 tablet    Take 2 tablets (650 mg) by mouth every 6 hours as needed for mild pain or fever    S/P  section       ibuprofen 600 MG tablet    ADVIL/MOTRIN    60 tablet    Take 1 tablet (600 mg) by mouth every 6 hours    S/P  section       PRENATAL VITAMIN PO      Take by mouth daily        sertraline 50 MG tablet    ZOLOFT    30 tablet    Take 1/2 tablet (25 mg) for 1-2 weeks, then increase to 1 tablet orally daily    Anxiety

## 2018-07-09 NOTE — PROGRESS NOTES
"  Name:  Nhi Alexander  Mrn: [id number]  Date of visit: 7/9/2018    PSYCHOLOGY OUTPATIENT VISIT NOTE       PRESENTING PROBLEMS/SYMPTOMS:  Depressed mood and anhedonia initiating during fertility treatments.  Loss of appetite and forcing self to eat.  Initial insomnia.  Guilt with belief that baby s medical situation is  my fault  and that she is causing distress to her  due to female factor infertility.  Able to feel energy when in NICU, otherwise lethargic and  I just sit.   Self esteem low.  Anxiety focused on baby s health and worry that she won t be able to have another child.   I m hyper obsessed with having another baby and doing it right.   (baby = Yohana, born at 24wks, 6/14/[birth year]) (last visit 3mos ago)  INTERVENTION AND RESPONSE:  Cognitive Behavioral therapy, individual.  Pt reported on G's progress, 1.st birthday, positive.  Still pumping, very little milk and plan to stop within month.  G getting 95% from G-tube, working with feeding therapist, only on 02 at night.  Plan to get PCA.  In early intervention program, positive.  Wanting to start family therapy, workign better with H, \"aware of his personal needs\" and she is more aware of G only.  \"If I'm home I'm in charge\" of G, little me-time.  Plan to move forward in individual therapy, encouraged consistency.  Noted some improved mood with longer days, warmer and able to be outside with G.  But also \"flashbacks\" with memories of birth etc triggered with 1st bday.  Somewhat \"checked out\" emotionally, but noted \"I still have a big thing\" she carries emotionally, \"I have a lot of stuff I haven't dealt with.\"  Brief ed provided re PTSD, importance of assessment around this.  Referrals for Family therapist and encouraged.    Plan: develop tx plan    Will be important to engage in treatment planning at upcoming visit.   ASSESSMENT: Pt denying sadness and noted \"checked out\" of emotions, but then tearful when asked to stay with herself.  Clearly she had " "learned to minimize her own sense of self to focus on keeping baby alive.  Trauma in L&D and postpartum.  Deny problems around eating or sleeping. Describing what appear to be trauma flashbacks, more evaluation necessary to r/o PTSD.  Continuing anxiety around G. Pattern of keeping control and difficulty letting go.  IMportant to re-assess sxs and r/o PTSD etc.  Noted pt's tendency to minimize her sxs.   Continuing context:  At 17yo, dad went crazy and parents .  Now questioning her own truth based on her 's behavior around baby's medical situation.  Pt wondering if she is \"crazy\" because she is upset that her H and in-laws seem unconcerned about baby and focused on vacationing.  I feel everything and he protects himself.\"  Example of her cat dying and H saying \"she will be fine.\"  \"I'm dealing with all this myself.\"  Trying to talk with H and shaking uncontrollably and crying.  \"I'm obligated to make it ok for him and his family.\"  Cultural issues (H Danish) as well and family issues apparent. Sister's wife just had double mastectomy day after baby born.    Mental Status Assessment:  Appearance:   Appropriate   Eye Contact:   Good   Psychomotor Behavior: Normal   Attitude:   Cooperative   Orientation:   All  Speech   Rate / Production: Normal    Volume:  Normal   Mood:    Anxious  Sad tearful  Thought Content:  Clear   Thought Form:  Coherent  Logical   Insight:    Fair     DIAGNOSIS:  MDD recurrent unspecified; adjustment disorder with anxiety, r/o PTSD  PLAN:    Patient to schedule followup visit.       Total time spent equals 60 minutes, individual psychotherapy.             "

## 2018-08-12 ENCOUNTER — HEALTH MAINTENANCE LETTER (OUTPATIENT)
Age: 33
End: 2018-08-12

## 2018-10-10 ENCOUNTER — TELEPHONE (OUTPATIENT)
Dept: NURSING | Facility: CLINIC | Age: 33
End: 2018-10-10

## 2018-10-10 NOTE — TELEPHONE ENCOUNTER
Pt calling with questions about hospital labs and vitals from hospitalization back in 6/5/17. She was being followed by the midwives at the time she was sent to [facility] and then sent to [facility] for monitoring and ended up with an early delivery via emergency C/S for extremely elevated BP's not responding to medications.  Pt inquiring about BP readings right before her C/S which were given based on hospital flow sheets. She asked for her 24 hr urine creatinine dated 6/9/17. Explained normal ranges and her result and that doctor base their care based on results.     Inquired on current status of pt and baby. She delivered at 24 weeks and is doing well now but has a G-Tube in place. Pt has been thinking about this recently and trying to put the memories together with facts. She is requesting copies of her chart and directed to [facility] [phone].  Explained that they can help with providing the information she is looking for in a readable view that's understandable for her. She plans on meeting with her OB and perinatologist that cared for her at the time of her hospitalization to ask questions and review her events at the time of delivery.  No further questions asked.

## 2018-12-27 ENCOUNTER — HOSPITAL ENCOUNTER (EMERGENCY)
Facility: CLINIC | Age: 33
Discharge: HOME | End: 2018-12-27
Admitting: PHYSICIAN ASSISTANT
Payer: COMMERCIAL

## 2018-12-27 ENCOUNTER — NURSE TRIAGE (OUTPATIENT)
Dept: NURSING | Facility: CLINIC | Age: 33
End: 2018-12-27

## 2018-12-27 VITALS
HEIGHT: 67 IN | BODY MASS INDEX: 40.49 KG/M2 | HEART RATE: 73 BPM | DIASTOLIC BLOOD PRESSURE: 76 MMHG | SYSTOLIC BLOOD PRESSURE: 153 MMHG | TEMPERATURE: 98.4 F | RESPIRATION RATE: 12 BRPM | WEIGHT: 258 LBS | OXYGEN SATURATION: 98 %

## 2018-12-27 DIAGNOSIS — S05.02XA ABRASION OF LEFT CORNEA, INITIAL ENCOUNTER: ICD-10-CM

## 2018-12-27 PROCEDURE — 25000125 ZZHC RX 250

## 2018-12-27 PROCEDURE — 99283 EMERGENCY DEPT VISIT LOW MDM: CPT

## 2018-12-27 RX ORDER — PROPARACAINE HYDROCHLORIDE 5 MG/ML
1 SOLUTION/ DROPS OPHTHALMIC ONCE
Status: COMPLETED | OUTPATIENT
Start: 2018-12-27 | End: 2018-12-27

## 2018-12-27 RX ORDER — PROPARACAINE HYDROCHLORIDE 5 MG/ML
SOLUTION/ DROPS OPHTHALMIC
Status: COMPLETED
Start: 2018-12-27 | End: 2018-12-27

## 2018-12-27 RX ORDER — ERYTHROMYCIN 5 MG/G
0.5 OINTMENT OPHTHALMIC 4 TIMES DAILY
Qty: 1 G | Refills: 0 | Status: SHIPPED | OUTPATIENT
Start: 2018-12-27 | End: 2019-01-06

## 2018-12-27 RX ADMIN — FLUORESCEIN SODIUM 1 STRIP: 1 STRIP OPHTHALMIC at 22:03

## 2018-12-27 RX ADMIN — PROPARACAINE HYDROCHLORIDE 1 DROP: 5 SOLUTION/ DROPS OPHTHALMIC at 22:03

## 2018-12-27 ASSESSMENT — MIFFLIN-ST. JEOR: SCORE: 1897.91

## 2018-12-27 ASSESSMENT — ENCOUNTER SYMPTOMS
EYE DISCHARGE: 1
EYE PAIN: 1
PHOTOPHOBIA: 0

## 2018-12-27 NOTE — ED AVS SNAPSHOT
Emergency Department  [address] [place] [id number]  Phone:  [phone]  Fax:  [phone]                                    Nhi Alexander   MRN: [id number]    Department:   Emergency Department   Date of Visit:  12/27/2018           After Visit Summary Signature Page    I have received my discharge instructions, and my questions have been answered. I have discussed any challenges I see with this plan with the nurse or doctor.    ..........................................................................................................................................  Patient/Patient Representative Signature      ..........................................................................................................................................  Patient Representative Print Name and Relationship to Patient    ..................................................               ................................................  Date                                   Time    ..........................................................................................................................................  Reviewed by Signature/Title    ...................................................              ..............................................  Date                                               Time          22EPIC Rev 08/18

## 2018-12-28 NOTE — TELEPHONE ENCOUNTER
Per patient, her toddler scratched eye ball with her fingernail about 45 minutes ago.   The eye is red. Patient has a blurry spot in her vision, but, she can see around it.  Denies double vision    Somewhat painful, rates pain as 4/10  Patient is worried about infection    Protocol and care advice reviewed  Caller states understanding of the recommended disposition.  Advised patient to go to the Emergency Room now.  Patient states she will do this.  Advised to call back if further questions or concerns    Reason for Disposition    Cut on eyelid or eyeball (Exception: superficial scratch on eyelid)    Additional Information    Negative: [1] Major bleeding (e.g., actively dripping or spurting) AND [2] can't be stopped    Negative: Knocked out (unconscious) > 1 minute    Negative: Difficult to awaken or acting confused  (e.g., disoriented, slurred speech)    Negative: Severe neck pain    Negative: Sounds like a life-threatening emergency to the triager    Negative: Vision is blurred or lost in either eye    Negative: Double vision or unable to look upwards    Protocols used: EYE INJURY-ADULT-

## 2018-12-28 NOTE — DISCHARGE INSTRUCTIONS
Discharge Instructions  Corneal Abrasion    Today you were treated for a scratch on the cornea of your eye, or a corneal abrasion.  The cornea is the clear layer of tissue that covers the colored part of your eye. Corneal abrasions are caused when something scratches your eye such as fingernails, animal paws, branches, pieces of paper, tiny pieces of rust, wood, glass, plastic or contact lenses. Corneal abrasions often make people feel like there is a speck of sand in the eye.  These abrasions also can cause severe eye pain, watery eyes, blurred vision and pain with bright light.      Generally, every Emergency Department visit should have a follow-up clinic visit with either a primary or a specialty clinic/provider. Please follow-up as instructed by your emergency provider today.    Return to the Emergency Department if:  Your vision worsens.  The appearance of your eye concerns you.  Anything else concerns you.    Treatment:  Tylenol  (acetaminophen), Motrin  (ibuprofen), or Advil  (ibuprofen) will help with the pain from the abrasion.    Use the antibiotic eye ointment or drops as directed until the antibiotics are finished.  Do not wear contacts until the antibiotic is finished.  Do not patch your eye, because this can increase your risk for infection.  Your symptoms should improve gradually over the next 2 days.  If they are not improving, it is very important that you see an eye provider right away.  If over the next few days, the pain is getting worse, you have increasing difficulty with vision or you have yellow drainage from your eye, you need to see the eye provider that day.  If you have difficulty getting in to see an eye provider, please return to an Urgent Care or Emergency Department for further evaluation and treatment.    If you were given a prescription for medicine here today, be sure to read all of the information (including the package insert) that comes with your prescription.  This will  include important information about the medicine, its side effects, and any warnings that you need to know about.  The pharmacist who fills the prescription can provide more information and answer questions you may have about the medicine.  If you have questions or concerns that the pharmacist cannot address, please call or return to the Emergency Department.   Remember that you can always come back to the Emergency Department if you are not able to see your regular provider in the amount of time listed above, if you get any new symptoms, or if there is anything that worries you.

## 2019-01-04 ENCOUNTER — TELEPHONE (OUTPATIENT)
Dept: FAMILY MEDICINE | Facility: CLINIC | Age: 34
End: 2019-01-04

## 2019-01-04 NOTE — TELEPHONE ENCOUNTER
Pt calling last night her bp was 155/100 at 10:30 and 10 minutes later was 160/100.  Denies any sxs.  The last 2 nights she has felt her heart rate has been elevated for no reason and she was relaxed and had some chest pain also.  She thinks the chest pain is related to lifting daughter.  States the pain does not last long and no other sxs.  Is able to take a deep breath without pain.  This morning went to [facility] and bp was 143/89 but did not check it at home first.  Did have bp issues with pregnancy.    Advised should be seen but due to having [facility] we cannot see her in clinic.  Recommend she call insurance company to see what clinics she is able to go to and schedule an appt.    Pt states understanding.    Gisell RIVAS RN  EP Triage

## 2019-01-04 NOTE — TELEPHONE ENCOUNTER
Reason for Call:  Other call back    Detailed comments: Pt states high blood pressure on home monitor  Wondering if should be seen    Phone Number Patient can be reached at: Home number on file [phone] (home)    Best Time: anytime    Can we leave a detailed message on this number? YES    Call taken on 1/4/2019 at 12:45 PM by Tameka Nicholas

## 2019-01-17 ENCOUNTER — OFFICE VISIT (OUTPATIENT)
Dept: OBGYN | Facility: CLINIC | Age: 34
End: 2019-01-17
Payer: COMMERCIAL

## 2019-01-17 VITALS
SYSTOLIC BLOOD PRESSURE: 104 MMHG | HEART RATE: 88 BPM | BODY MASS INDEX: 41.96 KG/M2 | DIASTOLIC BLOOD PRESSURE: 72 MMHG | HEIGHT: 66 IN

## 2019-01-17 DIAGNOSIS — N80.9 ENDOMETRIOSIS: ICD-10-CM

## 2019-01-17 DIAGNOSIS — N94.10 DYSPAREUNIA, FEMALE: ICD-10-CM

## 2019-01-17 DIAGNOSIS — Z12.4 CERVICAL CANCER SCREENING: ICD-10-CM

## 2019-01-17 DIAGNOSIS — E66.01 MORBID OBESITY (H): ICD-10-CM

## 2019-01-17 DIAGNOSIS — Z01.419 ENCOUNTER FOR GYNECOLOGICAL EXAMINATION WITHOUT ABNORMAL FINDING: Primary | ICD-10-CM

## 2019-01-17 DIAGNOSIS — M25.559 PAIN IN JOINT INVOLVING PELVIC REGION AND THIGH, UNSPECIFIED LATERALITY: ICD-10-CM

## 2019-01-17 DIAGNOSIS — Z13.228 SCREENING FOR METABOLIC DISORDER: ICD-10-CM

## 2019-01-17 LAB — HBA1C MFR BLD: 5.1 % (ref 0–5.6)

## 2019-01-17 PROCEDURE — G0476 HPV COMBO ASSAY CA SCREEN: HCPCS | Performed by: OBSTETRICS & GYNECOLOGY

## 2019-01-17 PROCEDURE — G0145 SCR C/V CYTO,THINLAYER,RESCR: HCPCS | Performed by: OBSTETRICS & GYNECOLOGY

## 2019-01-17 PROCEDURE — 80061 LIPID PANEL: CPT | Performed by: OBSTETRICS & GYNECOLOGY

## 2019-01-17 PROCEDURE — 99213 OFFICE O/P EST LOW 20 MIN: CPT | Mod: 25 | Performed by: OBSTETRICS & GYNECOLOGY

## 2019-01-17 PROCEDURE — 83036 HEMOGLOBIN GLYCOSYLATED A1C: CPT | Performed by: OBSTETRICS & GYNECOLOGY

## 2019-01-17 PROCEDURE — 82306 VITAMIN D 25 HYDROXY: CPT | Performed by: OBSTETRICS & GYNECOLOGY

## 2019-01-17 PROCEDURE — 36415 COLL VENOUS BLD VENIPUNCTURE: CPT | Performed by: OBSTETRICS & GYNECOLOGY

## 2019-01-17 PROCEDURE — 99395 PREV VISIT EST AGE 18-39: CPT | Performed by: OBSTETRICS & GYNECOLOGY

## 2019-01-17 ASSESSMENT — ANXIETY QUESTIONNAIRES
3. WORRYING TOO MUCH ABOUT DIFFERENT THINGS: NOT AT ALL
2. NOT BEING ABLE TO STOP OR CONTROL WORRYING: NOT AT ALL
5. BEING SO RESTLESS THAT IT IS HARD TO SIT STILL: NOT AT ALL
GAD7 TOTAL SCORE: 0
6. BECOMING EASILY ANNOYED OR IRRITABLE: NOT AT ALL
IF YOU CHECKED OFF ANY PROBLEMS ON THIS QUESTIONNAIRE, HOW DIFFICULT HAVE THESE PROBLEMS MADE IT FOR YOU TO DO YOUR WORK, TAKE CARE OF THINGS AT HOME, OR GET ALONG WITH OTHER PEOPLE: NOT DIFFICULT AT ALL
7. FEELING AFRAID AS IF SOMETHING AWFUL MIGHT HAPPEN: NOT AT ALL
1. FEELING NERVOUS, ANXIOUS, OR ON EDGE: NOT AT ALL

## 2019-01-17 ASSESSMENT — PATIENT HEALTH QUESTIONNAIRE - PHQ9
5. POOR APPETITE OR OVEREATING: NOT AT ALL
SUM OF ALL RESPONSES TO PHQ QUESTIONS 1-9: 1

## 2019-01-17 NOTE — PROGRESS NOTES
Nhi is a 33 year old  female who presents for annual exam.     Besides routine health maintenance,  she would like to discuss abdominal pain.    HPI:  The patient's PCP is none.      Last pap at [facility] 2016.  Just started period today.    Hx endometriosis (surgery at [facility] at the time, ). Has had ovarian cysts through the years.   For last 3mo has been having pain on left side. Last period had very bad low left pelvic pain, sharp, deep. None of her typical things-nsaids, heating pad, bath-made it go away. Just before went to ER, it went away.   For last 2 days feels like the cyst is still there, sharp.    Off BC for last 4yrs. Did IVF and conceived daughter, now 15mo old. Delivered at 24wk. Recently stopped pumping. Wants to discuss contraception and endo control.  Never been on contraceptives.  Has one embryo frozen yet. Is not sure if/when will use. Has no specific plans, is still emotional about her daughter's delivery    Since had c/s/delivery sex has not felt good. Initial penetration hurts. Area on top of vagina that hurts.       Review of PMH, SocHx, SurHx, FHx, medications completed. Epic updated.        GYNECOLOGIC HISTORY:    Patient's last menstrual period was 2019 (exact date).  Her current contraception method is: none.  She  reports that  has never smoked. she has never used smokeless tobacco.    Patient is sexually active.  STD testing offered?  Declined  Last PHQ-9 score on record =   PHQ-9 SCORE 2019   PHQ-9 Total Score 1     Last GAD7 score on record =   PATRICA-7 SCORE 2019   Total Score 0     Alcohol Score = 0    HEALTH MAINTENANCE:  Cholesterol: (  Cholesterol   Date Value Ref Range Status   2019 138 <200 mg/dL Final      Last Mammo: never, Next Mammo: age 38  Pap: Per patient, 2012 - WNIL, Done at [facility]  Colonoscopy:  never, Next Colonoscopy: age 48.  Dexa:  never    Health maintenance updated:  yes    HISTORY:  Obstetric History        T0      L1     SAB0   TAB0   Ectopic0   Multiple0   Live Births1       # Outcome Date GA Lbr Holden/2nd Weight Sex Delivery Anes PTL Lv   1  17 24w6d  0.46 kg (1 lb 0.2 oz) F CS-Classical   PEDRO      Name: OMAYRA,BABYZachery DUVAL      Apgar1:  7                Apgar5: 8          Patient Active Problem List   Diagnosis     Infertility, female     Obesity complicating pregnancy, second trimester     Indication for care in labor or delivery     Preeclampsia     S/P  section     Morbid obesity (H)     Past Surgical History:   Procedure Laterality Date      SECTION N/A 2017    Procedure:  SECTION;;  Surgeon: Shirley Alfredo MD;  Location: [facility] L+D     DAVINCI LYSIS OF ADHESIONS N/A 2015    Procedure: DAVINCI LYSIS OF ADHESIONS;  Surgeon: Kimberly Suarez MD;  Location: [facility] OR     DAVINCI PELVIC PROCEDURE N/A 2015    Procedure: DAVINCI XI PELVIC PROCEDURE;  Surgeon: Kimberly Suarez MD;  Location:  OR     DILATION AND CURETTAGE, OPERATIVE HYSTEROSCOPY WITH MORCELLATOR, COMBINED N/A 2015    Procedure: COMBINED DILATION AND CURETTAGE, OPERATIVE HYSTEROSCOPY WITH MORCELLATOR;  Surgeon: Kimberly Suarez MD;  Location:  OR     LAPAROSCOPIC TUBAL DYE STUDY Bilateral 2015    Procedure: LAPAROSCOPIC TUBAL DYE STUDY;  Surgeon: Kimberly Saurez MD;  Location:  OR     LAPAROSCOPY DIAGNOSTIC (GYN) N/A 2015    Procedure: LAPAROSCOPY DIAGNOSTIC (GYN);  Surgeon: Kimberly Suarez MD;  Location:  OR      Social History     Tobacco Use     Smoking status: Never Smoker     Smokeless tobacco: Never Used   Substance Use Topics     Alcohol use: No      Problem (# of Occurrences) Relation (Name,Age of Onset)    Family History Negative (2) Mother, Father    Gynecology (2) Maternal Grandmother: endometriosis, hyst done age29's (approx), Maternal Aunt: endometriosis, hyst done age 30's (approx)       Negative family history of: Coronary Artery Disease, Hypertension,  "Diabetes, Cerebrovascular Disease            Current Outpatient Medications   Medication Sig     Multiple Vitamins-Calcium (DAILY VITAMINS FOR WOMEN PO) Take 1 tablet by mouth daily     etonogestrel-ethinyl estradiol (NUVARING) 0.12-0.015 MG/24HR vaginal ring Place one new ring every 3 weeks into vagina. Remove and repeat.     methocarbamol (ROBAXIN) 750 MG tablet Take 1 tablet (750 mg) by mouth 3 times daily as needed for muscle spasms     vitamin D3 (CHOLECALCIFEROL) 09287 units capsule Take 1 capsule (50,000 Units) by mouth every 7 days for 8 doses     No current facility-administered medications for this visit.      No Known Allergies    Past medical, surgical, social and family histories were reviewed and updated in EPIC.    ROS:   12 point review of systems negative other than symptoms noted below.    EXAM:  /72 (BP Location: Right arm, Patient Position: Sitting, Cuff Size: Adult Large)   Pulse 88   Ht 1.67 m (5' 5.75\")   LMP 01/17/2019 (Exact Date)   Breastfeeding? No   BMI 41.96 kg/m     BMI: Body mass index is 41.96 kg/m .    PHYSICAL EXAM:  Constitutional:  Appearance: Well nourished, well developed, alert, in no acute distress  Neck:  Lymph Nodes:  No lymphadenopathy present    Thyroid:  Gland size normal, nontender, no nodules or masses present  on palpation  Chest:  Respiratory Effort:  Breathing unlabored  Cardiovascular:    Heart: Auscultation:  Regular rate, normal rhythm, no murmurs present  Breasts: Inspection of Breasts:  No lymphadenopathy present., Palpation of Breasts and Axillae:  No masses present on palpation, no breast tenderness., Axillary Lymph Nodes:  No lymphadenopathy present. and No nodularity, asymmetry or nipple discharge bilaterally.  Gastrointestinal:   Abdominal Examination:  Abdomen nontender to palpation, tone normal without rigidity or guarding, no masses present, umbilicus without lesions   Liver and Spleen:  No hepatomegaly present, liver nontender to " palpation    Hernias:  No hernias present  Lymphatic: Lymph Nodes:  No other lymphadenopathy present  Skin:  General Inspection:  No rashes present, no lesions present, no areas of  discoloration    Genitalia and Groin:  No rashes present, no lesions present, no areas of  discoloration, no masses present  Neurologic/Psychiatric:    Mental Status:  Oriented X3     Pelvic Exam:  External Genitalia:     +QTIP TESTING 3-6 OCLOCK. SLIGHT INVOLUNTARY TENSING OF VAGINAL MUSCHNormal appearance for age, no discharge present, no tenderness present, no inflammatory lesions present, color normal  Vagina:     Normal vaginal vault without central or paravaginal defects, no discharge present, no inflammatory lesions present, no masses present  Bladder:     Nontender to palpation  Urethra:   Urethral Body:  Urethra palpation normal, urethra structural support normal   Urethral Meatus:  No erythema or lesions present  Cervix:     Appearance healthy, no lesions present, nontender to palpation, no bleeding present  Uterus:     Uterus: firm, normal sized and nontender, anteverted in position.   Adnexa:     No adnexal tenderness present, no adnexal masses present  Perineum:     Perineum within normal limits, no evidence of trauma, no rashes or skin lesions present  Anus:     Anus within normal limits, no hemorrhoids present  Inguinal Lymph Nodes:     No lymphadenopathy present  Pubic Hair:     Normal pubic hair distribution for age  Genitalia and Groin:     No rashes present, no lesions present, no areas of discoloration, no masses present      COUNSELING:   Reviewed preventive health counseling, as reflected in patient instructions       Family planning    BMI: Body mass index is 41.96 kg/m .  Weight management plan: Discussed healthy diet and exercise guidelines    ASSESSMENT:  33 year old female with satisfactory annual exam.    ICD-10-CM    1. Encounter for gynecological examination without abnormal finding Z01.419 Pap imaged thin  layer screen with HPV - recommended age 30 - 65     HPV High Risk Types DNA Cervical     Vitamin D Deficiency     Lipid Profile     Hemoglobin A1c   2. Endometriosis N80.9 US Transvaginal Non OB   3. Pain in joint involving pelvic region and thigh, unspecified laterality M25.559 US Transvaginal Non OB   4. Cervical cancer screening Z12.4    5. Dyspareunia, female N94.10    6. Screening for metabolic disorder Z13.228 Pap imaged thin layer screen with HPV - recommended age 30 - 65     HPV High Risk Types DNA Cervical     Vitamin D Deficiency     Lipid Profile     Hemoglobin A1c   7. Morbid obesity (H) E66.01        PLAN:  -Pap/hpv obtained for cervical cancer screening. Reviewed guidelines-pap q 3yrs until age 30 when co-testing q 5 years.  -Breast self awareness discussed. Age 40 for mammogram.  -Discussed exercise-making plan, strength training. Nutrition encouraged.  -Colonoscopy age 45-50  -Osteoporosis prevention discussed.  -Desires metabolic/vitamin labs  -Endometriosis, pelvic pain.   Discussed natural history of endo, long term affects, management. Will check pelvic US due to current pain. Discussed this will not ID/exclude endo but rather etiologies for pain such as cyst.   +Qtip testing. Discussed dyspareunia, endo and tx with pelvic PT. Pain and physical exam likely multifactorial. Will likely send after US eval.   -Return one year for next annual exam    (15 ADDITIONAL minutes was spent face to face with the patient today discussing her history, diagnosis, and follow-up plan as noted above. Over 50% of the visit was spent in counseling and coordination of care.)        Yoli Christianson Masters, DO

## 2019-01-18 LAB
CHOLEST SERPL-MCNC: 138 MG/DL
DEPRECATED CALCIDIOL+CALCIFEROL SERPL-MC: 17 UG/L (ref 20–75)
HDLC SERPL-MCNC: 51 MG/DL
LDLC SERPL CALC-MCNC: 77 MG/DL
NONHDLC SERPL-MCNC: 87 MG/DL
TRIGL SERPL-MCNC: 49 MG/DL

## 2019-01-18 ASSESSMENT — ANXIETY QUESTIONNAIRES: GAD7 TOTAL SCORE: 0

## 2019-01-21 ENCOUNTER — ANCILLARY PROCEDURE (OUTPATIENT)
Dept: ULTRASOUND IMAGING | Facility: CLINIC | Age: 34
End: 2019-01-21
Payer: COMMERCIAL

## 2019-01-21 ENCOUNTER — OFFICE VISIT (OUTPATIENT)
Dept: OBGYN | Facility: CLINIC | Age: 34
End: 2019-01-21
Payer: COMMERCIAL

## 2019-01-21 VITALS
HEIGHT: 65 IN | HEART RATE: 72 BPM | SYSTOLIC BLOOD PRESSURE: 118 MMHG | WEIGHT: 241 LBS | DIASTOLIC BLOOD PRESSURE: 78 MMHG | BODY MASS INDEX: 40.15 KG/M2

## 2019-01-21 DIAGNOSIS — M25.559 PAIN IN JOINT INVOLVING PELVIC REGION AND THIGH, UNSPECIFIED LATERALITY: ICD-10-CM

## 2019-01-21 DIAGNOSIS — N80.9 ENDOMETRIOSIS: Primary | ICD-10-CM

## 2019-01-21 DIAGNOSIS — E55.9 VITAMIN D DEFICIENCY: ICD-10-CM

## 2019-01-21 DIAGNOSIS — N80.9 ENDOMETRIOSIS: ICD-10-CM

## 2019-01-21 DIAGNOSIS — N94.10 DYSPAREUNIA, FEMALE: ICD-10-CM

## 2019-01-21 LAB
COPATH REPORT: NORMAL
PAP: NORMAL

## 2019-01-21 PROCEDURE — 76830 TRANSVAGINAL US NON-OB: CPT | Performed by: OBSTETRICS & GYNECOLOGY

## 2019-01-21 PROCEDURE — 99214 OFFICE O/P EST MOD 30 MIN: CPT | Performed by: OBSTETRICS & GYNECOLOGY

## 2019-01-21 RX ORDER — ETONOGESTREL AND ETHINYL ESTRADIOL VAGINAL RING .015; .12 MG/D; MG/D
RING VAGINAL
Qty: 3 EACH | Refills: 6 | Status: SHIPPED | OUTPATIENT
Start: 2019-01-21 | End: 2019-02-18

## 2019-01-21 ASSESSMENT — MIFFLIN-ST. JEOR: SCORE: 1789.05

## 2019-01-21 NOTE — PROGRESS NOTES
SUBJECTIVE:                                                   Nhi Alexander is a 33 year old female who presents to clinic today for the following health issue(s): US f/u      HPI:  When was going through her IVF cycles, was getting recurrent UTI like symptoms-but was not UTIs and did not get diagnosed with IC. Wondering if hormonal meds will do this again.   Is hesitant about Lupron tx due to experience during IVF cycles.   Has been thinking about hormonal options. Wants to do Nuvring.     Reviewed labs done at last visit.     Patient's last menstrual period was 2019 (exact date)..   Patient is sexually active, .  Using none for contraception.    reports that  has never smoked. she has never used smokeless tobacco.    STD testing offered?  Declined    Health maintenance updated:  yes    Today's PHQ-2 Score:   PHQ-2 (  [employer]) 3/1/2017   Q1: Little interest or pleasure in doing things 0   Q2: Feeling down, depressed or hopeless 0   PHQ-2 Score 0     Today's PHQ-9 Score:   PHQ-9 SCORE 2019   PHQ-9 Total Score 1     Today's PATRICA-7 Score:   PATRICA-7 SCORE 2019   Total Score 0       Problem list and histories reviewed & adjusted, as indicated.  Additional history: as documented.    Patient Active Problem List   Diagnosis     Infertility, female     Abnormal cervical Papanicolaou smear     Obesity complicating pregnancy, second trimester     Indication for care in labor or delivery     Preeclampsia     S/P  section     Morbid obesity (H)     Past Surgical History:   Procedure Laterality Date      SECTION N/A 2017    Procedure:  SECTION;;  Surgeon: Shirley Alfredo MD;  [facility]     DAVINCI LYSIS OF ADHESIONS N/A 2015    Procedure: DAVINCI LYSIS OF ADHESIONS;  Surgeon: Kimberly Suarez MD;  Location:  OR     DAVINCI PELVIC PROCEDURE N/A 2015    Procedure: DAVINCI XI PELVIC PROCEDURE;  Surgeon: Kimberly Suarez MD;  Location:  OR      "DILATION AND CURETTAGE, OPERATIVE HYSTEROSCOPY WITH MORCELLATOR, COMBINED N/A 11/5/2015    Procedure: COMBINED DILATION AND CURETTAGE, OPERATIVE HYSTEROSCOPY WITH MORCELLATOR;  Surgeon: Kimberly Suarez MD;  Location:  OR     LAPAROSCOPIC TUBAL DYE STUDY Bilateral 11/5/2015    Procedure: LAPAROSCOPIC TUBAL DYE STUDY;  Surgeon: Kimberly Suarez MD;  Location:  OR     LAPAROSCOPY DIAGNOSTIC (GYN) N/A 11/5/2015    Procedure: LAPAROSCOPY DIAGNOSTIC (GYN);  Surgeon: Kimberly Suarez MD;  Location:  OR      Social History     Tobacco Use     Smoking status: Never Smoker     Smokeless tobacco: Never Used   Substance Use Topics     Alcohol use: No      Problem (# of Occurrences) Relation (Name,Age of Onset)    Family History Negative (2) Mother, Father    Gynecology (2) Maternal Grandmother: endometriosis, hyst done age31's (approx), Maternal Aunt: endometriosis, hyst done age 30's (approx)       Negative family history of: Coronary Artery Disease, Hypertension, Diabetes, Cerebrovascular Disease            Current Outpatient Medications   Medication Sig     etonogestrel-ethinyl estradiol (NUVARING) 0.12-0.015 MG/24HR vaginal ring Place one new ring every 3 weeks into vagina. Remove and repeat.     Multiple Vitamins-Calcium (DAILY VITAMINS FOR WOMEN PO) Take 1 tablet by mouth daily     vitamin D3 (CHOLECALCIFEROL) 76412 units capsule Take 1 capsule (50,000 Units) by mouth every 7 days for 8 doses     No current facility-administered medications for this visit.      Allergies   Allergen Reactions     Penicillins        ROS:      OBJECTIVE:     /78   Pulse 72   Ht 1.651 m (5' 5\")   Wt 109.3 kg (241 lb)   LMP 01/17/2019 (Exact Date)   BMI 40.10 kg/m    Body mass index is 40.1 kg/m .    Exam:  Constitutional:  Appearance: Well nourished, well developed alert, in no acute distress  Chest:  Respiratory Effort:  Breathing unlabored  Neurologic/Psychiatric:  Mental Status:  Oriented X3      In-Clinic Test " Results:  Results for orders placed or performed in visit on 01/21/19 (from the past 24 hour(s))   US Transvaginal Non OB    Narrative    Gynecological Ultrasound Report  Pelvic U/S - Transvaginal    [facility]      Referring Provider: Dr. Yoli Perez  Sonographer: Lida James RDMS  Indication: Pelvic pain  LMP (mm/dd/yyyy): 01/18/19    Gynecological Ultrasonography:     Uterus: anteverted  Size: 7.28 x 4.68 x 3.98cm.    Findings: Normal   Endometrium: Thickness total 5.05mm    Findings: Normal  Right Ovary: 2.90 x 1.98 x 2.40cm.    Left Ovary: 2.35 x 1.94 x 2.07cm.   Cul de Sac/Pouch of Pedro: No FF      Impression:  Normal pelvic ultrasound.    Yoli Goncalvess, DO  ___________________________________________________________________________  _______                  ASSESSMENT/PLAN:                                                        ICD-10-CM    1. Endometriosis N80.9 etonogestrel-ethinyl estradiol (NUVARING) 0.12-0.015 MG/24HR vaginal ring   2. Pain in joint involving pelvic region and thigh, unspecified laterality M25.559 [facility]     etonogestrel-ethinyl estradiol (NUVARING) 0.12-0.015 MG/24HR vaginal ring   3. Dyspareunia, female N94.10 [facility]   4. Vitamin D deficiency E55.9 vitamin D3 (CHOLECALCIFEROL) 68504 units capsule     **Vitamin D Deficiency FUTURE 2mo         -Reviewed labs with Nhi.   Vit D deficient. Will start 50,000IU/d for 8 weeks. Then recheck. Contin Vitamin D 1-2,000IU/d thereafter if normal.  -Reviewed pelvic US from today. WNL. Discussed this does not r/o pain caused by endometriosis as we cannot necessarily see this.   Reviewed qtip testing and exam last visit showing diffuse pain of external vagina as well as pelvic sidewalls. Rec pelvis PT. Discussed what this is and how helps. Additionally, tx endomtriosis should help.   Discussed tx options for endometriosis and risks and benefits.  Discussed estrogen/progesterone affects and how helps pelvic pain and endo. Discussed her concerns regarding hormones and ca and informed of VTE risk with estrogen.  Will start nuvaring one ring q 3wk continuously. Discussed BTB. F/U 3mo.   Questions answered.     Yoli Christianson Masters, [facility] [place]

## 2019-01-23 LAB
FINAL DIAGNOSIS: NORMAL
HPV HR 12 DNA CVX QL NAA+PROBE: NEGATIVE
HPV16 DNA SPEC QL NAA+PROBE: NEGATIVE
HPV18 DNA SPEC QL NAA+PROBE: NEGATIVE
SPECIMEN DESCRIPTION: NORMAL
SPECIMEN SOURCE CVX/VAG CYTO: NORMAL

## 2019-01-24 ENCOUNTER — NURSE TRIAGE (OUTPATIENT)
Dept: NURSING | Facility: CLINIC | Age: 34
End: 2019-01-24

## 2019-01-24 NOTE — TELEPHONE ENCOUNTER
Nhi was looking for information regarding a sports medicine appointment. She was referred to them for vaginal pain. She asked to speak to a nurse or someone that could give her some details as she does not know what to expect.  She was put through to FNA. I couldn't answer her question. I was able to give her a recommendation of how to get more information which she agreed to try.    CONCHITA F[facility]

## 2019-01-26 ENCOUNTER — HOSPITAL ENCOUNTER (EMERGENCY)
Facility: CLINIC | Age: 34
Discharge: HOME | End: 2019-01-26
Attending: NURSE PRACTITIONER | Admitting: NURSE PRACTITIONER
Payer: COMMERCIAL

## 2019-01-26 ENCOUNTER — APPOINTMENT (OUTPATIENT)
Dept: GENERAL RADIOLOGY | Facility: CLINIC | Age: 34
End: 2019-01-26
Payer: COMMERCIAL

## 2019-01-26 VITALS
RESPIRATION RATE: 18 BRPM | BODY MASS INDEX: 31.34 KG/M2 | OXYGEN SATURATION: 100 % | TEMPERATURE: 98.7 F | HEART RATE: 90 BPM | HEIGHT: 66 IN | SYSTOLIC BLOOD PRESSURE: 151 MMHG | DIASTOLIC BLOOD PRESSURE: 72 MMHG | WEIGHT: 195 LBS

## 2019-01-26 DIAGNOSIS — R07.89 CHEST WALL PAIN: ICD-10-CM
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
## 2019-01-26 PROCEDURE — 99284 EMERGENCY DEPT VISIT MOD MDM: CPT | Mod: 25

## 2019-01-26 PROCEDURE — 71046 X-RAY EXAM CHEST 2 VIEWS: CPT

## 2019-01-26 PROCEDURE — 93005 ELECTROCARDIOGRAM TRACING: CPT

## 2019-01-26 PROCEDURE — 25000132 ZZH RX MED GY IP 250 OP 250 PS 637: Performed by: NURSE PRACTITIONER

## 2019-01-26 RX ORDER — ACETAMINOPHEN 500 MG
1000 TABLET ORAL ONCE
Status: COMPLETED | OUTPATIENT
Start: 2019-01-26 | End: 2019-01-26

## 2019-01-26 RX ORDER — HYDROCODONE BITARTRATE AND ACETAMINOPHEN 5; 325 MG/1; MG/1
1 TABLET ORAL EVERY 6 HOURS PRN
Qty: 10 TABLET | Refills: 0 | Status: SHIPPED | OUTPATIENT
Start: 2019-01-26 | End: 2019-02-18

## 2019-01-26 RX ORDER — METHOCARBAMOL 750 MG/1
750 TABLET, FILM COATED ORAL 3 TIMES DAILY PRN
Qty: 15 TABLET | Refills: 0 | Status: SHIPPED | OUTPATIENT
Start: 2019-01-26 | End: 2019-02-18

## 2019-01-26 RX ADMIN — ACETAMINOPHEN 1000 MG: 500 TABLET, FILM COATED ORAL at 16:10

## 2019-01-26 ASSESSMENT — ENCOUNTER SYMPTOMS
ABDOMINAL PAIN: 0
BACK PAIN: 1

## 2019-01-26 ASSESSMENT — MIFFLIN-ST. JEOR: SCORE: 1596.26

## 2019-01-26 NOTE — ED AVS SNAPSHOT
Emergency Department  [address] [place] [id number]  Phone:  [phone]  Fax:  [phone]                                    Nhi Alexander   MRN: [id number]    Department:   Emergency Department   Date of Visit:  1/26/2019           After Visit Summary Signature Page    I have received my discharge instructions, and my questions have been answered. I have discussed any challenges I see with this plan with the nurse or doctor.    ..........................................................................................................................................  Patient/Patient Representative Signature      ..........................................................................................................................................  Patient Representative Print Name and Relationship to Patient    ..................................................               ................................................  Date                                   Time    ..........................................................................................................................................  Reviewed by Signature/Title    ...................................................              ..............................................  Date                                               Time          22EPIC Rev 08/18
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
## 2019-01-27 LAB — INTERPRETATION ECG - MUSE: NORMAL

## 2019-02-13 ENCOUNTER — TELEPHONE (OUTPATIENT)
Dept: OBGYN | Facility: CLINIC | Age: 34
End: 2019-02-13

## 2019-02-13 NOTE — TELEPHONE ENCOUNTER
Pt decided to make an appointment to have an IUD placed as opposed to the nuvaring.  Questions regarding IUD vs nuvaring, placement of IUD, BTB, menstraul symptoms with IUD and nuvaring. Questions answered and pt reassured. Pt advised to take 600-800 mg ib before insertion to help with the mild discomfort/cramping she may experience.   Maria E Omer RN on 2/13/2019 at 11:59 AM

## 2019-02-14 NOTE — PROGRESS NOTES
"  IUD Insertion:      Subjective: Nhi Alexander is a 34 year old  presents for IUD and desires Mirena type IUD.    Patient has been given the opportunity to ask questions about all forms of birth control, including all options appropriate for Nhi Alexander. Discussed that no method of birth control, except abstinence is 100% effective against pregnancy or sexually transmitted infection.     Nhi Alexander understands she may have the IUD removed at any time. IUD should be removed by a health care provider.    The entire insertion procedure was reviewed with the patient, including care after plan. Last intercourse over 3wk.  No allergy to betadine or shellfish.     HCG Qual Urine   Date Value Ref Range Status   2015 Negative NEG Final     Is a pregnancy test required: Yes.  Was it positive or negative?  Negative  Was a consent obtained?  Yes      /84   Ht 1.676 m (5' 6\")   Wt 107.5 kg (237 lb)   LMP 2019   BMI 38.25 kg/m      Pelvic Exam:   EG/BUS: normal genital architecture without lesions, erythema or abnormal secretions.   Vagina: moist, pink, rugae with physiologic discharge and secretions  Cervix: parous no lesions and pink, moist, closed, without lesion or CMT  Uterus: retroverted position, mobile, no pain  Adnexa: within normal limits and no masses, nodularity, tenderness    PROCEDURE NOTE: -- IUD Insertion    Reason for Insertion: contraception and Endometriosis    Premedicated with naproxen.  Under sterile technique, cervix was visualized with speculum and prepped with Betadine solution swab x 3. Tenaculum was placed for stability. The uterus was gently straightened and sounded to 8.0 cm. IUD prepared for placement, and IUD inserted according to 's instructions without difficulty or significant resitance, and deployed at the fundus. The strings were visualized and trimmed to 3.0 cm from the external os. Tenaculum was removed and hemostasis noted. Speculum " removed.  Patient tolerated procedure well.    Lot # [id number]   Exp: 06/2021  S/N: [id number]  NDC: 24027-371-33    EBL: minimal    Complications: none    ASSESSMENT:     ICD-10-CM    1. Encounter for insertion of intrauterine contraceptive device Z30.430 levonorgestrel (MIRENA) 20 MCG/24HR IUD 20 mcg     INSERTION INTRAUTERINE DEVICE   2. Pre-procedure lab exam Z01.812 Beta HCG Qual, Urine - [facility] and [place] ([id number])        PLAN:    Given 's handouts, including when to have IUD removed, list of danger s/sx, side effects and follow up recommended. Encouraged condom use for prevention of STD. Back up contraception advised for 7 days if progestin method. Advised to call for any fever, for prolonged or severe pain or bleeding, abnormal vaginal discharge, or unable to palpate strings. She was advised to use pain medications (ibuprofen) as needed for mild to moderate pain. Advised to follow-up in clinic in 4-6 weeks for IUD string check if unable to find strings or as directed by provider. No sex/tampons x 1wk, condoms x 1wk after that.    Yoli Christianson Masters, DO

## 2019-02-18 ENCOUNTER — OFFICE VISIT (OUTPATIENT)
Dept: OBGYN | Facility: CLINIC | Age: 34
End: 2019-02-18
Payer: COMMERCIAL

## 2019-02-18 VITALS
BODY MASS INDEX: 38.09 KG/M2 | HEIGHT: 66 IN | DIASTOLIC BLOOD PRESSURE: 84 MMHG | WEIGHT: 237 LBS | SYSTOLIC BLOOD PRESSURE: 122 MMHG

## 2019-02-18 DIAGNOSIS — Z30.430 ENCOUNTER FOR INSERTION OF INTRAUTERINE CONTRACEPTIVE DEVICE: Primary | ICD-10-CM

## 2019-02-18 DIAGNOSIS — Z01.812 PRE-PROCEDURE LAB EXAM: ICD-10-CM

## 2019-02-18 LAB — BETA HCG QUAL IFA URINE: NEGATIVE

## 2019-02-18 PROCEDURE — 58300 INSERT INTRAUTERINE DEVICE: CPT | Performed by: OBSTETRICS & GYNECOLOGY

## 2019-02-18 PROCEDURE — 84703 CHORIONIC GONADOTROPIN ASSAY: CPT | Performed by: OBSTETRICS & GYNECOLOGY

## 2019-02-18 ASSESSMENT — MIFFLIN-ST. JEOR: SCORE: 1781.77

## 2019-06-04 ENCOUNTER — TELEPHONE (OUTPATIENT)
Dept: OBGYN | Facility: CLINIC | Age: 34
End: 2019-06-04

## 2019-06-04 NOTE — TELEPHONE ENCOUNTER
1st attempt. Called patient regarding overdue lab. Patient stated that she was not aware there was a lab order futured for her. She will call back at her earliest conveince to schedule a lab only appt. Lab extended 3 months.

## 2019-09-16 NOTE — TELEPHONE ENCOUNTER
Consent to communicate PHI. 2nd attempt. Called patient regarding overdue lab. Patient stated that she declines to have lab done. Lab cancelled.

## 2019-10-15 ENCOUNTER — TELEPHONE (OUTPATIENT)
Dept: OBGYN | Facility: CLINIC | Age: 34
End: 2019-10-15

## 2019-10-15 NOTE — TELEPHONE ENCOUNTER
"2/18/19 IUD placed by Dr Perez    Questions on sx's  First couple of months, very consistent light spotting; resolved and now no bleeding or spotting since.   Unsure when her \"cycle\" is or if she is having cycles.  Explained mirena IUD and progesterone only. Yes, still ovulating but thinning lining therefore nothing to shed/bleed. Can still have sx's of ovulation-breast tenderness, low abdominal cramps and/or mood changes.  Pt concerned mood changes are occurring with her cycle but has no idea where she is in her cycle.  Explained that if she is having concerns with mood, recommended to schedule an apt with Chris to discuss.  Pt verbalized understanding, in agreement with plan, and voiced no further questions.  Cindy Garcia RN on 10/15/2019 at 4:03 PM      "

## 2019-11-03 ENCOUNTER — HEALTH MAINTENANCE LETTER (OUTPATIENT)
Age: 34
End: 2019-11-03

## 2020-01-17 ENCOUNTER — OFFICE VISIT (OUTPATIENT)
Dept: UROLOGY | Facility: CLINIC | Age: 35
End: 2020-01-17
Payer: COMMERCIAL

## 2020-01-17 ENCOUNTER — HOSPITAL ENCOUNTER (OUTPATIENT)
Dept: CT IMAGING | Facility: CLINIC | Age: 35
Discharge: HOME | End: 2020-01-17
Attending: PHYSICIAN ASSISTANT | Admitting: PHYSICIAN ASSISTANT
Payer: COMMERCIAL

## 2020-01-17 VITALS — HEIGHT: 66 IN | WEIGHT: 200 LBS | BODY MASS INDEX: 32.14 KG/M2

## 2020-01-17 DIAGNOSIS — R30.0 DYSURIA: Primary | ICD-10-CM

## 2020-01-17 DIAGNOSIS — R10.9 LEFT FLANK PAIN: ICD-10-CM

## 2020-01-17 LAB
ALBUMIN UR-MCNC: NEGATIVE MG/DL
APPEARANCE UR: CLEAR
BILIRUB UR QL STRIP: NEGATIVE
COLOR UR AUTO: YELLOW
GLUCOSE UR STRIP-MCNC: NEGATIVE MG/DL
HGB UR QL STRIP: NEGATIVE
KETONES UR STRIP-MCNC: NEGATIVE MG/DL
LEUKOCYTE ESTERASE UR QL STRIP: NEGATIVE
NITRATE UR QL: POSITIVE
PH UR STRIP: 6 PH (ref 5–7)
RESIDUAL VOLUME (RV) (EXTERNAL): 169
SOURCE: ABNORMAL
SP GR UR STRIP: 1.02 (ref 1–1.03)
UROBILINOGEN UR STRIP-ACNC: 0.2 EU/DL (ref 0.2–1)

## 2020-01-17 PROCEDURE — 74176 CT ABD & PELVIS W/O CONTRAST: CPT

## 2020-01-17 PROCEDURE — 99203 OFFICE O/P NEW LOW 30 MIN: CPT | Mod: 25 | Performed by: PHYSICIAN ASSISTANT

## 2020-01-17 PROCEDURE — 51798 US URINE CAPACITY MEASURE: CPT | Performed by: PHYSICIAN ASSISTANT

## 2020-01-17 PROCEDURE — 81003 URINALYSIS AUTO W/O SCOPE: CPT | Performed by: PHYSICIAN ASSISTANT

## 2020-01-17 PROCEDURE — 87086 URINE CULTURE/COLONY COUNT: CPT | Performed by: PHYSICIAN ASSISTANT

## 2020-01-17 PROCEDURE — 87109 MYCOPLASMA: CPT | Performed by: PHYSICIAN ASSISTANT

## 2020-01-17 RX ORDER — PHENAZOPYRIDINE HYDROCHLORIDE 95 MG/1
190 TABLET ORAL 2 TIMES DAILY
COMMUNITY
End: 2023-03-01

## 2020-01-17 ASSESSMENT — PAIN SCALES - GENERAL: PAINLEVEL: MODERATE PAIN (4)

## 2020-01-17 ASSESSMENT — MIFFLIN-ST. JEOR: SCORE: 1613.94

## 2020-01-17 NOTE — PROGRESS NOTES
2020      CC: Painful urination, left flank pain    HPI:  Nhi Alexander is a 34 year old female who presents for consultation via self referral for evaluation of the above.      No gross hematuria, no abdominal pain, hx of stones. Most urine cultures are negative. Pain can be significant and wrap around to the left flank. Has had a few episodes since last week. Azo is helpful. Normal pelvic US done at  and  was negative.     Hx of endometriosis. Has been referred to pelvic floor PT but has not initiated a consult.       Past Medical History:   Diagnosis Date     Abdominal pain, unspecified abdominal location 2009     Problem list name updated by automated process. Provider to review     CARDIOVASCULAR SCREENING; LDL GOAL LESS THAN 160 10/31/2010     Chest pain 3/11/2015    feels like chest wall pain,       Dysmenorrhea      Hypertension      Infertility, female      Papanicolaou smear of vagina with low grade squamous intraepithelial lesion (LGSIL) 2008       Past Surgical History:   Procedure Laterality Date      SECTION N/A 2017    Procedure:  SECTION;;  Surgeon: Shirley Alfredo MD;  Location: [facility]     DAVINCI LYSIS OF ADHESIONS N/A 2015    Procedure: DAVINCI LYSIS OF ADHESIONS;  Surgeon: Kimberly Suarez MD;  Location: [facility] OR     DAVINCI PELVIC PROCEDURE N/A 2015    Procedure: DAVINCI XI PELVIC PROCEDURE;  Surgeon: Kimberly Suarez MD;  Location:  OR     DILATION AND CURETTAGE, OPERATIVE HYSTEROSCOPY WITH MORCELLATOR, COMBINED N/A 2015    Procedure: COMBINED DILATION AND CURETTAGE, OPERATIVE HYSTEROSCOPY WITH MORCELLATOR;  Surgeon: Kimberly Suarez MD;  Location:  OR     LAPAROSCOPIC TUBAL DYE STUDY Bilateral 2015    Procedure: LAPAROSCOPIC TUBAL DYE STUDY;  Surgeon: Kimberly Suarez MD;  Location:  OR     LAPAROSCOPY DIAGNOSTIC (GYN) N/A 2015    Procedure: LAPAROSCOPY DIAGNOSTIC (GYN);  Surgeon: Kimberly Suarez MD;  Location:  [facility] OR       Social History     Socioeconomic History     Marital status:      Spouse name: Not on file     Number of children: Not on file     Years of education: Not on file     Highest education level: Not on file   Occupational History     Occupation:      Employer: [employer]   Social Needs     Financial resource strain: Not on file     Food insecurity:     Worry: Not on file     Inability: Not on file     Transportation needs:     Medical: Not on file     Non-medical: Not on file   Tobacco Use     Smoking status: Never Smoker     Smokeless tobacco: Never Used   Substance and Sexual Activity     Alcohol use: No     Drug use: No     Sexual activity: Yes     Partners: Male     Birth control/protection: I.U.D.     Comment: Inserted 2/18/19.   Lifestyle     Physical activity:     Days per week: Not on file     Minutes per session: Not on file     Stress: Not on file   Relationships     Social connections:     Talks on phone: Not on file     Gets together: Not on file     Attends Restorationist service: Not on file     Active member of club or organization: Not on file     Attends meetings of clubs or organizations: Not on file     Relationship status: Not on file     Intimate partner violence:     Fear of current or ex partner: Not on file     Emotionally abused: Not on file     Physically abused: Not on file     Forced sexual activity: Not on file   Other Topics Concern     Parent/sibling w/ CABG, MI or angioplasty before 65F 55M? Not Asked   Social History Narrative    Dairy/d 0 servings/d.     Caffeine 1 servings/d    Exercise 4 x week    Sunscreen used - Yes    Seatbelts used - Yes    Working smoke/CO detectors in the home - Yes    Guns stored in the home - No    Self Breast Exams - Yes    Self Testicular Exam - NOT APPLICABLE    Eye Exam up to date - Yes    Dental Exam up to date - Yes    Pap Smear up to date - Yes 7/2009 NIL    Mammogram up to date - NOT APPLICABLE    PSA up to date - NOT  "APPLICABLE    Dexa Scan up to date - NOT APPLICABLE    Flex Sig / Colonoscopy up to date - NOT APPLICABLE    Immunizations up to date - Yes    Abuse: Current or Past(Physical, Sexual or Emotional)- No    Do you feel safe in your environment - Yes    Last Updated: Helen Jay 7/23/2010       Family History   Problem Relation Age of Onset     Gynecology Maternal Grandmother         endometriosis, hyst done age31's (approx)     Gynecology Maternal Aunt         endometriosis, hyst done age 30's (approx)     Family History Negative Mother      Family History Negative Father      Coronary Artery Disease No family hx of      Hypertension No family hx of      Diabetes No family hx of      Cerebrovascular Disease No family hx of        ROS:14 point ROS neg other than the symptoms noted above in the HPI.    No Known Allergies    Current Outpatient Medications   Medication     Multiple Vitamins-Calcium (DAILY VITAMINS FOR WOMEN PO)     No current facility-administered medications for this visit.          PEx:   Ht 1.676 m (5' 6\")   Wt 90.7 kg (200 lb)   BMI 32.28 kg/m      GEN: NAD  EYES: EOMI  MOUTH: MMM  NECK: Supple  RESP: Unlabored breathing  CARDIAC: No LE edema  SKIN: Warm  ABD: soft  NEURO: AAO    Urine: nitrite +  PVR: 169cc (voided in a hurry)    A/P: Nhi Alexander is a 34 year old female with dysuria.  - U/A, UC  - Mycoplasma and Ureaplasma  - Bladder irritant list provided.  - Hydrate  -CT to r/o stone  -Encouraged her to try pelvic floor PT as recommended by her GYN, Dr. Chris Dietz, [facility]  [facility]    30 minutes were spent with the patient today, > 50% in counseling and coordination of care                  "

## 2020-01-17 NOTE — NURSING NOTE
Chief Complaint   Patient presents with     Lower abdominal and lower back pain     Patient isf  following up after [facility] visit two days ago with lower back paib, lower abdominal pain, and possible UTI       Post Void Residual results by bladder scan: 169 mL    Opal Calix, EMT

## 2020-01-17 NOTE — LETTER
2020       RE: hNi Alexander  [address]  [place] [address]     Dear Colleague,    Thank you for referring your patient, Nhi Alexander, to the [facility] [place] [facility]. Please see a copy of my visit note below.    2020      CC: Painful urination, left flank pain    HPI:  Nhi Alexander is a 34 year old female who presents for consultation via self referral for evaluation of the above.      No gross hematuria, no abdominal pain, hx of stones. Most urine cultures are negative. Pain can be significant and wrap around to the left flank. Has had a few episodes since last week. Azo is helpful. Normal pelvic US done at  and  was negative.     Hx of endometriosis. Has been referred to pelvic floor PT but has not initiated a consult.       Past Medical History:   Diagnosis Date     Abdominal pain, unspecified abdominal location 2009     Problem list name updated by automated process. Provider to review     CARDIOVASCULAR SCREENING; LDL GOAL LESS THAN 160 10/31/2010     Chest pain 3/11/2015    feels like chest wall pain,       Dysmenorrhea      Hypertension      Infertility, female      Papanicolaou smear of vagina with low grade squamous intraepithelial lesion (LGSIL) 2008       Past Surgical History:   Procedure Laterality Date      SECTION N/A 2017    Procedure:  SECTION;;  Surgeon: Shirley Alfredo MD;  Location: [facility] L+D     DAVINCI LYSIS OF ADHESIONS N/A 2015    Procedure: DAVINCI LYSIS OF ADHESIONS;  Surgeon: Kimberly Suarez MD;  Location: [facility] OR     DAVINCI PELVIC PROCEDURE N/A 2015    Procedure: DAVINCI XI PELVIC PROCEDURE;  Surgeon: Kimberly Suarez MD;  Location: [facility] OR     DILATION AND CURETTAGE, OPERATIVE HYSTEROSCOPY WITH MORCELLATOR, COMBINED N/A 2015    Procedure: COMBINED DILATION AND CURETTAGE, OPERATIVE HYSTEROSCOPY WITH MORCELLATOR;   Surgeon: Kimberly Suarez MD;  Location:  OR     LAPAROSCOPIC TUBAL DYE STUDY Bilateral 11/5/2015    Procedure: LAPAROSCOPIC TUBAL DYE STUDY;  Surgeon: Kimberly Suarez MD;  Location:  OR     LAPAROSCOPY DIAGNOSTIC (GYN) N/A 11/5/2015    Procedure: LAPAROSCOPY DIAGNOSTIC (GYN);  Surgeon: Kimberly Suarez MD;  Location:  OR       Social History     Socioeconomic History     Marital status:      Spouse name: Not on file     Number of children: Not on file     Years of education: Not on file     Highest education level: Not on file   Occupational History     Occupation:      Employer: [employer]   Social Needs     Financial resource strain: Not on file     Food insecurity:     Worry: Not on file     Inability: Not on file     Transportation needs:     Medical: Not on file     Non-medical: Not on file   Tobacco Use     Smoking status: Never Smoker     Smokeless tobacco: Never Used   Substance and Sexual Activity     Alcohol use: No     Drug use: No     Sexual activity: Yes     Partners: Male     Birth control/protection: I.U.D.     Comment: Inserted 2/18/19.   Lifestyle     Physical activity:     Days per week: Not on file     Minutes per session: Not on file     Stress: Not on file   Relationships     Social connections:     Talks on phone: Not on file     Gets together: Not on file     Attends Adventism service: Not on file     Active member of club or organization: Not on file     Attends meetings of clubs or organizations: Not on file     Relationship status: Not on file     Intimate partner violence:     Fear of current or ex partner: Not on file     Emotionally abused: Not on file     Physically abused: Not on file     Forced sexual activity: Not on file   Other Topics Concern     Parent/sibling w/ CABG, MI or angioplasty before 65F 55M? Not Asked   Social History Narrative    Dairy/d 0 servings/d.     Caffeine 1 servings/d    Exercise 4 x week    Sunscreen used - Yes    Seatbelts used - Yes  "   Working smoke/CO detectors in the home - Yes    Guns stored in the home - No    Self Breast Exams - Yes    Self Testicular Exam - NOT APPLICABLE    Eye Exam up to date - Yes    Dental Exam up to date - Yes    Pap Smear up to date - Yes 7/2009 NIL    Mammogram up to date - NOT APPLICABLE    PSA up to date - NOT APPLICABLE    Dexa Scan up to date - NOT APPLICABLE    Flex Sig / Colonoscopy up to date - NOT APPLICABLE    Immunizations up to date - Yes    Abuse: Current or Past(Physical, Sexual or Emotional)- No    Do you feel safe in your environment - Yes    Last Updated: Helen Jay 7/23/2010       Family History   Problem Relation Age of Onset     Gynecology Maternal Grandmother         endometriosis, hyst done age31's (approx)     Gynecology Maternal Aunt         endometriosis, hyst done age 30's (approx)     Family History Negative Mother      Family History Negative Father      Coronary Artery Disease No family hx of      Hypertension No family hx of      Diabetes No family hx of      Cerebrovascular Disease No family hx of        ROS:14 point ROS neg other than the symptoms noted above in the HPI.    No Known Allergies    Current Outpatient Medications   Medication     Multiple Vitamins-Calcium (DAILY VITAMINS FOR WOMEN PO)     No current facility-administered medications for this visit.          PEx:   Ht 1.676 m (5' 6\")   Wt 90.7 kg (200 lb)   BMI 32.28 kg/m       GEN: NAD  EYES: EOMI  MOUTH: MMM  NECK: Supple  RESP: Unlabored breathing  CARDIAC: No LE edema  SKIN: Warm  ABD: soft  NEURO: AAO    Urine: nitrite +  PVR: 169cc (voided in a hurry)    A/P: Nhi Alexander is a 34 year old female with dysuria.  - U/A, UC  - Mycoplasma and Ureaplasma  - Bladder irritant list provided.  - Hydrate  -CT to r/o stone  -Encouraged her to try pelvic floor PT as recommended by her GYN, Dr. Chris Dietz, [facility]  [facility]    30 minutes were spent with the patient today, > 50% in counseling and " coordination of care                    Again, thank you for allowing me to participate in the care of your patient.      Sincerely,    Christina Dietz PA-C, [facility]

## 2020-01-18 ENCOUNTER — NURSE TRIAGE (OUTPATIENT)
Dept: NURSING | Facility: CLINIC | Age: 35
End: 2020-01-18

## 2020-01-19 LAB
BACTERIA SPEC CULT: NO GROWTH
SPECIMEN SOURCE: NORMAL

## 2020-01-19 NOTE — TELEPHONE ENCOUNTER
Nhi calls and says that she got an e-mail that says that her urine is positive for Nitrates and she wants to know if she has a bladder infection. Pt. Says that she wants to know the results from her CT scan from yesterday, too. RN then checked Epic but did not see any note from the Dr. Provider Status:Open. Pt. Says that she really needs to know the results and if she has a bladder infection. Dr. Loredo- Urology-was paged, to call pt. At: [phone], at: [phone] via page .  Reason for Disposition    [1] Follow-up call to recent contact AND [2] information only call, no triage required    Additional Information    Negative: [1] Caller is not with the adult (patient) AND [2] reporting urgent symptoms    Negative: Lab result questions    Negative: Medication questions    Negative: Caller can't be reached by phone    Negative: Caller has already spoken to PCP or another triager    Negative: RN needs further essential information from caller in order to complete triage    Negative: Requesting regular office appointment    Negative: [1] Caller requesting NON-URGENT health information AND [2] PCP's office is the best resource    Negative: Health Information question, no triage required and triager able to answer question    Negative: General information question, no triage required and triager able to answer question    Negative: Question about upcoming scheduled test, no triage required and triager able to answer question    Negative: [1] Caller is not with the adult (patient) AND [2] probable NON-URGENT symptoms    Protocols used: INFORMATION ONLY [facility]

## 2020-01-20 DIAGNOSIS — A64 STI (SEXUALLY TRANSMITTED INFECTION): Primary | ICD-10-CM

## 2020-01-20 LAB
BACTERIA SPEC CULT: ABNORMAL
SPECIMEN SOURCE: ABNORMAL

## 2020-01-20 RX ORDER — DOXYCYCLINE 100 MG/1
100 TABLET ORAL 2 TIMES DAILY
Qty: 20 TABLET | Refills: 0 | Status: SHIPPED | OUTPATIENT
Start: 2020-01-20 | End: 2020-01-30

## 2020-01-24 LAB
BACTERIA SPEC CULT: NORMAL
SPECIMEN SOURCE: NORMAL

## 2020-01-31 ENCOUNTER — TELEPHONE (OUTPATIENT)
Dept: OBGYN | Facility: CLINIC | Age: 35
End: 2020-01-31

## 2020-01-31 NOTE — TELEPHONE ENCOUNTER
Pt calling to request if Dr Perez will treat her  with abx as suggest by the urolosgist she just saw. SHe is being treated fr ureaplasma. I told the pt I would consult the dr and find out if she will treat or if her  will need to be treated by his PCP.    Discussed w/ Dr Perez    Called the pt back.. Explained that the dr has recommended that  He be seen by his PCP  She verbalized understanding.    Lazara Parikh RN on 1/31/2020 at 2:01 PM    .

## 2020-02-17 DIAGNOSIS — R35.0 URINARY FREQUENCY: ICD-10-CM

## 2020-02-17 DIAGNOSIS — R35.0 URINARY FREQUENCY: Primary | ICD-10-CM

## 2020-02-17 DIAGNOSIS — R39.89 URETHRAL PAIN: ICD-10-CM

## 2020-02-17 PROCEDURE — 87109 MYCOPLASMA: CPT | Performed by: PHYSICIAN ASSISTANT

## 2020-02-25 LAB
BACTERIA SPEC CULT: NORMAL
SPECIMEN SOURCE: NORMAL

## 2020-07-22 ENCOUNTER — HOSPITAL ENCOUNTER (EMERGENCY)
Facility: CLINIC | Age: 35
Discharge: HOME | End: 2020-07-22
Attending: EMERGENCY MEDICINE | Admitting: EMERGENCY MEDICINE
Payer: MEDICAID

## 2020-07-22 VITALS
SYSTOLIC BLOOD PRESSURE: 123 MMHG | TEMPERATURE: 98.5 F | OXYGEN SATURATION: 99 % | WEIGHT: 220 LBS | HEIGHT: 67 IN | DIASTOLIC BLOOD PRESSURE: 89 MMHG | RESPIRATION RATE: 16 BRPM | BODY MASS INDEX: 34.53 KG/M2

## 2020-07-22 DIAGNOSIS — T78.40XA ALLERGIC REACTION, INITIAL ENCOUNTER: ICD-10-CM

## 2020-07-22 PROCEDURE — 99285 EMERGENCY DEPT VISIT HI MDM: CPT | Mod: 25

## 2020-07-22 PROCEDURE — 25000131 ZZH RX MED GY IP 250 OP 636 PS 637: Performed by: EMERGENCY MEDICINE

## 2020-07-22 PROCEDURE — 96372 THER/PROPH/DIAG INJ SC/IM: CPT

## 2020-07-22 PROCEDURE — 25000125 ZZHC RX 250: Performed by: EMERGENCY MEDICINE

## 2020-07-22 PROCEDURE — 25000132 ZZH RX MED GY IP 250 OP 250 PS 637: Performed by: EMERGENCY MEDICINE

## 2020-07-22 RX ORDER — EPINEPHRINE 0.3 MG/.3ML
0.3 INJECTION SUBCUTANEOUS
Qty: 0.6 ML | Refills: 0 | Status: SHIPPED | OUTPATIENT
Start: 2020-07-22

## 2020-07-22 RX ORDER — PREDNISONE 20 MG/1
40 TABLET ORAL ONCE
Status: COMPLETED | OUTPATIENT
Start: 2020-07-22 | End: 2020-07-22

## 2020-07-22 RX ORDER — FAMOTIDINE 20 MG/1
20 TABLET, FILM COATED ORAL ONCE
Status: COMPLETED | OUTPATIENT
Start: 2020-07-22 | End: 2020-07-22

## 2020-07-22 RX ADMIN — FAMOTIDINE 20 MG: 20 TABLET ORAL at 20:16

## 2020-07-22 RX ADMIN — PREDNISONE 40 MG: 20 TABLET ORAL at 20:16

## 2020-07-22 RX ADMIN — EPINEPHRINE 0.5 MG: 1 INJECTION INTRAMUSCULAR; INTRAVENOUS; SUBCUTANEOUS at 20:21

## 2020-07-22 ASSESSMENT — ENCOUNTER SYMPTOMS
FEVER: 0
SHORTNESS OF BREATH: 0
NUMBNESS: 1
ROS SKIN COMMENTS: NO ITCHING
FACIAL SWELLING: 1
SINUS PRESSURE: 0
TROUBLE SWALLOWING: 0
NERVOUS/ANXIOUS: 1

## 2020-07-22 ASSESSMENT — MIFFLIN-ST. JEOR: SCORE: 1715.54

## 2020-07-22 NOTE — ED AVS SNAPSHOT
Emergency Department  [address] [place] [address]  Phone:  [phone]  Fax:  [phone]                                    Nhi Alexander   MRN: [id number]    Department:   Emergency Department   Date of Visit:  7/22/2020           After Visit Summary Signature Page    I have received my discharge instructions, and my questions have been answered. I have discussed any challenges I see with this plan with the nurse or doctor.    ..........................................................................................................................................  Patient/Patient Representative Signature      ..........................................................................................................................................  Patient Representative Print Name and Relationship to Patient    ..................................................               ................................................  Date                                   Time    ..........................................................................................................................................  Reviewed by Signature/Title    ...................................................              ..............................................  Date                                               Time          22EPIC Rev 08/18

## 2020-07-23 NOTE — ED TRIAGE NOTES
"Patient states out for a walk in a wooded area.    Right facial swelling started during her walk & is getting worse.  Having difficulty talking due to it spreading & feeing \"hard\".  "

## 2020-07-23 NOTE — ED PROVIDER NOTES
"History     Chief Complaint:  Facial Swelling and Allergic Reaction       HPI  Nhi Alexander is a 35 year old female who presents for evaluation of allergic reaction and facial swelling.The patient reports that she was going for a walk near the woods when the lower portion of her lip and jaw started to become \"rock solid\" and swollen. Her pink fingers are described to feel numb as well as maybe having anxiety about the event. She states that the event started at 1730. She denies any itching, fevers, recent dental issues, or shortness of breath, or sinus issues.  She states that she does not remember being bitten or stung by anything. She denies any new medications. She also states that she was not manipulating her face or prodding at a pimple etc.    Allergies:  No Known Drug Allergies     Medications:   Medications reviewed. No pertinent medications.     Medical History:   Female infertility   Obesity   Preeclampsia    Surgical History    section   Lysis of adhesions  Pelvic procedure  Dilation and curettage     Family History:   Family history reviewed. No pertinent family history.     Social History:  Patient was not accompanied to the ED.  Smoking Status: Negative   Smokeless Tobacco: Negative   Alcohol Use: Negative   Drug Use: Negative   Primary Physician: No Ref-Primary, Physician     Review of Systems   Constitutional: Negative for fever.   HENT: Positive for facial swelling. Negative for dental problem, sinus pressure and trouble swallowing.    Respiratory: Negative for shortness of breath.    Skin:        No itching   Neurological: Positive for numbness.   Psychiatric/Behavioral: The patient is nervous/anxious.    All other systems reviewed and are negative.      Physical Exam     Patient Vitals for the past 24 hrs:   BP Temp Temp src Heart Rate Resp SpO2 Height Weight   20 123/89 98.5  F (36.9  C) Oral 76 16 99 % 1.702 m (5' 7\") 99.8 kg (220 lb)          Physical " Exam  Constitutional: Heavy set white female sitting no respiratory distress.  HENT:   No swelling of tongue posterior oropharynx or subinguinal tissue. No obvious swelling of lips. Questionable induration of right chin. No redness. No submandibular or adenopathy swelling. Trachea midline nontender.   Eyes: EOM are normal. Pupils are equal, round, and reactive to light.   Neck: Normal range of motion. No JVD present. No cervical adenopathy.  Cardiovascular: Regular rhythm.  Exam reveals no gallop and no friction rub.    No murmur heard.  Pulmonary/Chest: Bilateral breath sounds normal. No wheezes, rhonchi or rales.  Abdominal: Soft. No tenderness. No rebound or guarding.   Musculoskeletal: No edema. No tenderness.   Lymphadenopathy: No lymphadenopathy.   Neurological: Alert and oriented to person, place, and time. Normal strength. Coordination normal.   Skin: Skin is warm and dry. No rash noted. No erythema.      Emergency Department Course     Interventions:   2016 Pepcid 20 mg PO  2016 Deltasone 40 mg PO  2021 Adrenalin 0.5 mg IM     Emergency Department Course:    1958 Nursing notes and vitals reviewed.    2000 I performed an exam of the patient as documented above.     2137 Patient rechecked and updated.      2142 Findings and plan explained to the Patient. Patient discharged home with instructions regarding supportive care, medications, and reasons to return. The importance of close follow-up was reviewed. The patient was prescribed as below.    Impression & Plan     Medical Decision Making:   Nhi Alexander is a 35 year old female who presents after walking in a wooded area and experiencing swelling and discomfort in the right side of her chin. She felt it go up to the right side toward her ear. She did not have any shortness of breath or swelling inside her mouth that she was aware off. She has had no recent dental pian. It was not particularly itchy. She had a spider bite once before which did not have  any itching and caused severe pain and she is worried about that. On exam there is some very questionable swelling and induration of the right chin. There was no redness, rash, or warmth. Her exam and dental exam is unremarkable. There is no submandibular swelling. Given the patient's history we elected a trial of epi, prednisone, and Pepcid. She is driving so she did not receive benadryl. After a few hours she notes to be feeling better. I do not think this represents angioedema or dental infection but may represent any insect bite. Although with atypical symptoms. She will use cold compress. I will give her an Epipen prescription. She should  Benadryl. She should make a follow up with her primary for allergy testing. I have told her that if developers infection it should become red and warm and in which case she will need a follow up and antibiotics.         Diagnosis:     ICD-10-CM    1. Allergic reaction, initial encounter  T78.40XA         Disposition:  Discharged to home.    Discharge Medications:  New Prescriptions    EPINEPHRINE (ANY BX GENERIC EQUIV) 0.3 MG/0.3ML INJECTION 2-PACK    Inject 0.3 mLs (0.3 mg) into the muscle once as needed for anaphylaxis       Scribe Disclosure:  I, Koffi Mcdonnell, am serving as a scribe at 8:00 PM on 7/22/2020 to document services personally performed by Jann Saez MD based on my observations and the provider's statements to me.          Jann Saez MD  07/22/20 0783

## 2020-11-16 ENCOUNTER — HEALTH MAINTENANCE LETTER (OUTPATIENT)
Age: 35
End: 2020-11-16

## 2021-07-14 ENCOUNTER — TELEPHONE (OUTPATIENT)
Dept: OBGYN | Facility: CLINIC | Age: 36
End: 2021-07-14

## 2021-07-14 DIAGNOSIS — N80.9 ENDOMETRIOSIS: Primary | ICD-10-CM

## 2021-07-14 DIAGNOSIS — N94.10 DYSPAREUNIA IN FEMALE: ICD-10-CM

## 2021-07-14 DIAGNOSIS — M25.559 PAIN IN JOINT INVOLVING PELVIC REGION AND THIGH, UNSPECIFIED LATERALITY: ICD-10-CM

## 2021-07-14 NOTE — TELEPHONE ENCOUNTER
Patient was looking for referral for pelvic floor therapy. Never went and is now wanting to go.  Discussed with patient referral we have on file was for DILCIA through [facility] and not through [facility]. Patient is not sure if DILCIA would be covered. Discussed it is best to check with ins first prior to us putting in a new referral since referrals are only good for 1 year.  She will check with insurance and let us now where to send to.  Patient informed we do still like to see patients annually for ov.    Sylvia Patrick RN

## 2021-07-15 NOTE — TELEPHONE ENCOUNTER
Order needed renewal - PT order placed    Pt informed and will call to schedule.  Cindy Garcia RN on 7/15/2021 at 12:01 PM

## 2021-09-18 ENCOUNTER — HEALTH MAINTENANCE LETTER (OUTPATIENT)
Age: 36
End: 2021-09-18

## 2022-01-02 ENCOUNTER — HEALTH MAINTENANCE LETTER (OUTPATIENT)
Age: 37
End: 2022-01-02

## 2022-05-18 ENCOUNTER — APPOINTMENT (OUTPATIENT)
Dept: GENERAL RADIOLOGY | Facility: CLINIC | Age: 37
End: 2022-05-18
Attending: EMERGENCY MEDICINE
Payer: COMMERCIAL

## 2022-05-18 ENCOUNTER — HOSPITAL ENCOUNTER (EMERGENCY)
Facility: CLINIC | Age: 37
Discharge: HOME | End: 2022-05-18
Attending: EMERGENCY MEDICINE | Admitting: EMERGENCY MEDICINE
Payer: COMMERCIAL

## 2022-05-18 VITALS
WEIGHT: 250 LBS | SYSTOLIC BLOOD PRESSURE: 114 MMHG | HEIGHT: 68 IN | DIASTOLIC BLOOD PRESSURE: 57 MMHG | HEART RATE: 99 BPM | BODY MASS INDEX: 37.89 KG/M2 | OXYGEN SATURATION: 97 % | TEMPERATURE: 98.7 F | RESPIRATION RATE: 16 BRPM

## 2022-05-18 DIAGNOSIS — R09.81 SINUS CONGESTION: ICD-10-CM

## 2022-05-18 DIAGNOSIS — R00.0 SINUS TACHYCARDIA: ICD-10-CM

## 2022-05-18 DIAGNOSIS — J18.9 PNEUMONIA OF LEFT LOWER LOBE DUE TO INFECTIOUS ORGANISM: ICD-10-CM

## 2022-05-18 LAB
ALBUMIN SERPL-MCNC: 3.3 G/DL (ref 3.4–5)
ALP SERPL-CCNC: 84 U/L (ref 40–150)
ALT SERPL W P-5'-P-CCNC: 22 U/L (ref 0–50)
ANION GAP SERPL CALCULATED.3IONS-SCNC: 6 MMOL/L (ref 3–14)
AST SERPL W P-5'-P-CCNC: 11 U/L (ref 0–45)
ATRIAL RATE - MUSE: 95 BPM
BASOPHILS # BLD AUTO: 0 10E3/UL (ref 0–0.2)
BASOPHILS NFR BLD AUTO: 0 %
BILIRUB SERPL-MCNC: 0.5 MG/DL (ref 0.2–1.3)
BUN SERPL-MCNC: 9 MG/DL (ref 7–30)
CALCIUM SERPL-MCNC: 8.4 MG/DL (ref 8.5–10.1)
CHLORIDE BLD-SCNC: 106 MMOL/L (ref 94–109)
CO2 SERPL-SCNC: 27 MMOL/L (ref 20–32)
CREAT SERPL-MCNC: 0.77 MG/DL (ref 0.52–1.04)
DIASTOLIC BLOOD PRESSURE - MUSE: NORMAL MMHG
EOSINOPHIL # BLD AUTO: 0.1 10E3/UL (ref 0–0.7)
EOSINOPHIL NFR BLD AUTO: 1 %
ERYTHROCYTE [DISTWIDTH] IN BLOOD BY AUTOMATED COUNT: 12.9 % (ref 10–15)
FLUAV RNA SPEC QL NAA+PROBE: NEGATIVE
FLUBV RNA RESP QL NAA+PROBE: NEGATIVE
GFR SERPL CREATININE-BSD FRML MDRD: >90 ML/MIN/1.73M2
GLUCOSE BLD-MCNC: 102 MG/DL (ref 70–99)
HCT VFR BLD AUTO: 41.1 % (ref 35–47)
HGB BLD-MCNC: 13.7 G/DL (ref 11.7–15.7)
IMM GRANULOCYTES # BLD: 0.1 10E3/UL
IMM GRANULOCYTES NFR BLD: 0 %
INTERPRETATION ECG - MUSE: NORMAL
LIPASE SERPL-CCNC: 106 U/L (ref 73–393)
LYMPHOCYTES # BLD AUTO: 1.2 10E3/UL (ref 0.8–5.3)
LYMPHOCYTES NFR BLD AUTO: 8 %
MCH RBC QN AUTO: 29.8 PG (ref 26.5–33)
MCHC RBC AUTO-ENTMCNC: 33.3 G/DL (ref 31.5–36.5)
MCV RBC AUTO: 90 FL (ref 78–100)
MONOCYTES # BLD AUTO: 1 10E3/UL (ref 0–1.3)
MONOCYTES NFR BLD AUTO: 7 %
MONOCYTES NFR BLD AUTO: NEGATIVE %
NEUTROPHILS # BLD AUTO: 12 10E3/UL (ref 1.6–8.3)
NEUTROPHILS NFR BLD AUTO: 84 %
NRBC # BLD AUTO: 0 10E3/UL
NRBC BLD AUTO-RTO: 0 /100
P AXIS - MUSE: 47 DEGREES
PLATELET # BLD AUTO: 277 10E3/UL (ref 150–450)
POTASSIUM BLD-SCNC: 4 MMOL/L (ref 3.4–5.3)
PR INTERVAL - MUSE: 158 MS
PROT SERPL-MCNC: 7.3 G/DL (ref 6.8–8.8)
QRS DURATION - MUSE: 78 MS
QT - MUSE: 344 MS
QTC - MUSE: 432 MS
R AXIS - MUSE: 70 DEGREES
RBC # BLD AUTO: 4.59 10E6/UL (ref 3.8–5.2)
RSV RNA SPEC NAA+PROBE: NEGATIVE
SARS-COV-2 RNA RESP QL NAA+PROBE: NEGATIVE
SODIUM SERPL-SCNC: 139 MMOL/L (ref 133–144)
SYSTOLIC BLOOD PRESSURE - MUSE: NORMAL MMHG
T AXIS - MUSE: 52 DEGREES
VENTRICULAR RATE- MUSE: 95 BPM
WBC # BLD AUTO: 14.3 10E3/UL (ref 4–11)

## 2022-05-18 PROCEDURE — 258N000003 HC RX IP 258 OP 636: Performed by: EMERGENCY MEDICINE

## 2022-05-18 PROCEDURE — 80053 COMPREHEN METABOLIC PANEL: CPT | Performed by: EMERGENCY MEDICINE

## 2022-05-18 PROCEDURE — 87637 SARSCOV2&INF A&B&RSV AMP PRB: CPT | Performed by: EMERGENCY MEDICINE

## 2022-05-18 PROCEDURE — 83690 ASSAY OF LIPASE: CPT | Performed by: EMERGENCY MEDICINE

## 2022-05-18 PROCEDURE — C9803 HOPD COVID-19 SPEC COLLECT: HCPCS

## 2022-05-18 PROCEDURE — 96361 HYDRATE IV INFUSION ADD-ON: CPT

## 2022-05-18 PROCEDURE — 250N000011 HC RX IP 250 OP 636: Performed by: EMERGENCY MEDICINE

## 2022-05-18 PROCEDURE — 86308 HETEROPHILE ANTIBODY SCREEN: CPT | Performed by: EMERGENCY MEDICINE

## 2022-05-18 PROCEDURE — 85025 COMPLETE CBC W/AUTO DIFF WBC: CPT | Performed by: EMERGENCY MEDICINE

## 2022-05-18 PROCEDURE — 36415 COLL VENOUS BLD VENIPUNCTURE: CPT | Performed by: EMERGENCY MEDICINE

## 2022-05-18 PROCEDURE — 71045 X-RAY EXAM CHEST 1 VIEW: CPT

## 2022-05-18 PROCEDURE — 96374 THER/PROPH/DIAG INJ IV PUSH: CPT

## 2022-05-18 PROCEDURE — 250N000013 HC RX MED GY IP 250 OP 250 PS 637: Performed by: EMERGENCY MEDICINE

## 2022-05-18 PROCEDURE — 93005 ELECTROCARDIOGRAM TRACING: CPT

## 2022-05-18 PROCEDURE — 96375 TX/PRO/DX INJ NEW DRUG ADDON: CPT

## 2022-05-18 PROCEDURE — 99284 EMERGENCY DEPT VISIT MOD MDM: CPT | Mod: CS,25

## 2022-05-18 RX ORDER — ONDANSETRON 2 MG/ML
4 INJECTION INTRAMUSCULAR; INTRAVENOUS ONCE
Status: COMPLETED | OUTPATIENT
Start: 2022-05-18 | End: 2022-05-18

## 2022-05-18 RX ORDER — AZITHROMYCIN 250 MG/1
TABLET, FILM COATED ORAL
Qty: 6 TABLET | Refills: 0 | Status: SHIPPED | OUTPATIENT
Start: 2022-05-18 | End: 2023-02-24

## 2022-05-18 RX ORDER — KETOROLAC TROMETHAMINE 15 MG/ML
15 INJECTION, SOLUTION INTRAMUSCULAR; INTRAVENOUS ONCE
Status: COMPLETED | OUTPATIENT
Start: 2022-05-18 | End: 2022-05-18

## 2022-05-18 RX ORDER — ACETAMINOPHEN 500 MG
1000 TABLET ORAL ONCE
Status: COMPLETED | OUTPATIENT
Start: 2022-05-18 | End: 2022-05-18

## 2022-05-18 RX ADMIN — KETOROLAC TROMETHAMINE 15 MG: 15 INJECTION, SOLUTION INTRAMUSCULAR; INTRAVENOUS at 10:30

## 2022-05-18 RX ADMIN — ACETAMINOPHEN 1000 MG: 500 TABLET, FILM COATED ORAL at 10:30

## 2022-05-18 RX ADMIN — ONDANSETRON 4 MG: 2 INJECTION INTRAMUSCULAR; INTRAVENOUS at 10:28

## 2022-05-18 RX ADMIN — SODIUM CHLORIDE 1000 ML: 9 INJECTION, SOLUTION INTRAVENOUS at 10:28

## 2022-05-18 ASSESSMENT — ENCOUNTER SYMPTOMS
DYSURIA: 0
SHORTNESS OF BREATH: 1
WHEEZING: 1
DIAPHORESIS: 1
FEVER: 0
HEMATURIA: 0
COUGH: 1
CHILLS: 1
VOMITING: 1
APPETITE CHANGE: 1
DIARRHEA: 0
MYALGIAS: 1
FREQUENCY: 0
SORE THROAT: 1

## 2022-05-18 NOTE — DISCHARGE INSTRUCTIONS
Prescription strength dosing instructions:  - 600mg ibuprofen (Advil, Motrin) every 6 hours as needed for fever/pain/inflammation.  Naprosyn (Naproxen, Aleve) works similarly and can be used instead, if preferred.  - 650mg acetaminophen (tylenol) every 4-6 hours or 1000mg every 6-8 hours (maximum of 3000mg in 24 hours).  Acetaminophen is often in combination over the counter and prescription pain medications.  Be sure to include this in daily total.    These medications work differently and can be used in combination.      For Nasal Congestion:  - Sudafed (often behind the counter, available without prescription)  - Mucinex  - Afrin nasal spray (maximum use 3 days)  - Nasal steroids s.a. Flonase, Rhinocort, or generic

## 2022-05-18 NOTE — ED TRIAGE NOTES
Having cold S/S for 8 days now she has taken a in home covid test but it came back negative.  She is having terrible congestion, with uncontrollable chills with no fever.  She is achy and taking Advil but its not helping much, she is on all sort of cold medications.  She feels like its turing into something worse.     Triage Assessment     Row Name 05/18/22 0906       Triage Assessment (Adult)    Airway WDL WDL       Respiratory WDL    Respiratory WDL X  cough with SOB       Cardiac WDL    Cardiac WDL WDL       Cognitive/Neuro/Behavioral WDL    Cognitive/Neuro/Behavioral WDL WDL

## 2022-05-18 NOTE — ED PROVIDER NOTES
History   Chief Complaint:  Flu Symptoms       The history is provided by the patient.      Nhi Alexander is a 37 year old female with history of hypertension who presents with flu symptoms. The patient reports chills, congestion, and cold symptoms for about 8 days. She states she can feel her heart racing. The patient also endorses cough, diaphoresis, shaking, body aches, ear pain, and sore throat. The patient reports her symptoms worsened in the past 2 days and she also was vomiting yesterday. The patient states she has some shortness of breath. She reports mild wheezing and chest pain when laying down. She notes she has been eating and drinking less. The patient self measured her O2 SATs to be 97 at home. The patient reports taking Mucinex along with 2-3 tablets of Advil about every 4 hours. She notes some relief of symptoms when taking these regularly. She notes the last time she took medications was around 0600 this morning. The patient reports no concern for pregnancy and reports she has an IUD in place. The patient denies experiencing any fever, diarrhea, dysuria, hematuria, increased urine frequency, leg swelling, or calf pain.     Review of Systems   Constitutional: Positive for appetite change, chills and diaphoresis. Negative for fever.   HENT: Positive for congestion, ear pain and sore throat.    Respiratory: Positive for cough, shortness of breath and wheezing.    Cardiovascular: Positive for chest pain. Negative for leg swelling.   Gastrointestinal: Positive for vomiting. Negative for diarrhea.   Genitourinary: Negative for dysuria, frequency and hematuria.   Musculoskeletal: Positive for myalgias.   All other systems reviewed and are negative.    Allergies:  Penicillins     Medications:  EPINEPHrine (ANY BX GENERIC EQUIV) 0.3 MG/0.3ML injection 2-pack  phenazopyridine (AZO URINARY PAIN RELIEF) 95 MG tablet  NITROFURANTOIN ORAL    levonorgestrel (MIRENA) 20 MCG/24HR IUD    levalbuterol (XOPENEX  "HFA) 45 mcg/actuation inhaler   sertraline (ZOLOFT) 25 MG tablet    LORazepam (ATIVAN) 0.5 MG tablet      Past Medical History:      Dysmenorrhea   Hypertension   Infertility, female   Papanicolaou smear of vagina with low grade squamous intraepithelial lesion (LGSIL)   Generalized anxiety disorder   Moderate episode of recurrent major depressive disorder     Past Surgical History:    C section   Davinci lysis of adhesions   Davinci pelvic procedure   Dilation and curettage, operative hysteroscopy with morcellator  Laparoscopic tubal dye study   Laparoscopy diagnostic GYN      Social History:  The patient presents to the emergency department alone.   The patient arrived to the emergency department via car.     Physical Exam     Patient Vitals for the past 24 hrs:   BP Temp Temp src Pulse Resp SpO2 Height Weight   05/18/22 1155 114/57 98.7  F (37.1  C) Oral 99 -- 97 % -- --   05/18/22 1130 -- -- -- -- -- 96 % -- --   05/18/22 1100 -- -- -- 82 -- 96 % -- --   05/18/22 1050 -- -- -- 92 -- 98 % -- --   05/18/22 1045 -- -- -- -- -- 96 % -- --   05/18/22 1035 130/88 -- -- 90 -- 96 % -- --   05/18/22 1030 130/88 -- -- -- -- -- -- --   05/18/22 0859 (!) 147/76 99.3  F (37.4  C) Oral (!) 125 16 95 % 1.737 m (5' 8.4\") 113.4 kg (250 lb)       Physical Exam  Eyes:               Sclera white; Pupils are equal and round  ENT:                External ears and nares normal, oropharynx w/o swelling or exudate  LN:                   Tender bilateral cervical adenopathy  CV:                  Rate as above with regular rhythm   Resp:               Breath sounds clear and equal bilaterally                          Non-labored, no retractions or accessory muscle use  GI:                   Abdomen is soft, non-tender, non-distended                          No rebound tenderness or peritoneal features  MS:                  Moves all extremities, no BLE edema  Skin:                Warm and dry  Neuro:             Speech is normal and " fluent. No apparent deficit.    Emergency Department Course   ECG  ECG taken at 1029, ECG read at 1033  Normal sinus rhythm    No significant change, as compared to prior, dated 1/26/19.  Rate 95 bpm. CA interval 158 ms. QRS duration 78 ms. QT/QTc 344/432 ms. P-R-T axes 47 70 52.    Imaging:  XR Chest Port 1 View   Final Result   IMPRESSION: AP view of the chest was obtained. Cardiomediastinal   silhouette is within normal limits. Patchy pulmonary opacity projects   over the lateral aspect of the left lower lobe, indeterminate, could   be infectious. No significant pleural effusion or pneumothorax.      MITCHELL ZALDIVAR MD            SYSTEM ID:  [id number]        The above imaging workup was performed.   Report per radiology    Laboratory:  Labs Ordered and Resulted from Time of ED Arrival to Time of ED Departure   COMPREHENSIVE METABOLIC PANEL - Abnormal       Result Value    Sodium 139      Potassium 4.0      Chloride 106      Carbon Dioxide (CO2) 27      Anion Gap 6      Urea Nitrogen 9      Creatinine 0.77      Calcium 8.4 (*)     Glucose 102 (*)     Alkaline Phosphatase 84      AST 11      ALT 22      Protein Total 7.3      Albumin 3.3 (*)     Bilirubin Total 0.5      GFR Estimate >90     CBC WITH PLATELETS AND DIFFERENTIAL - Abnormal    WBC Count 14.3 (*)     RBC Count 4.59      Hemoglobin 13.7      Hematocrit 41.1      MCV 90      MCH 29.8      MCHC 33.3      RDW 12.9      Platelet Count 277      % Neutrophils 84      % Lymphocytes 8      % Monocytes 7      % Eosinophils 1      % Basophils 0      % Immature Granulocytes 0      NRBCs per 100 WBC 0      Absolute Neutrophils 12.0 (*)     Absolute Lymphocytes 1.2      Absolute Monocytes 1.0      Absolute Eosinophils 0.1      Absolute Basophils 0.0      Absolute Immature Granulocytes 0.1      Absolute NRBCs 0.0     INFLUENZA A/B & SARS-COV2 PCR MULTIPLEX - Normal    Influenza A PCR Negative      Influenza B PCR Negative      RSV PCR Negative      SARS CoV2 PCR  Negative     LIPASE - Normal    Lipase 106     MONONUCLEOSIS SCREEN - Normal    Mononucleosis Screen Negative          Emergency Department Course:             Reviewed:  I reviewed nursing notes, vitals, past medical history and Care Everywhere    Assessments:  0953 I obtained history and examined the patient as noted above.   1202 I rechecked the patient and explained findings.     Interventions:  1028: ondansetron (ZOFRAN) injection 4 mg, IV   1028: 0.9% sodium chloride BOLUS, 1000 mL, IV   1030: acetaminophen (TYLENOL) tablet 1,000 mg, PO   1030: ketorolac (TORADOL) injection 15 mg, IV     Disposition:  The patient was discharged to home.     Impression & Plan     Medical Decision Making: Influenza and COVID are on the differential as well as pneumonia and sinusitis.  She was noted to be tachycardic on triage vitals and EKG demonstrates a sinus rhythm.  She received IV fluids and heart rate normalized.  I would not suspect a pulmonary embolism to cause a cough or chills.  She has no other risk factors for DVT or PE.  This was discussed with her and will not be pursued further.  Influenza, COVID, mononucleosis this test all came back negative.  Blood work was notable for an elevated white blood cell count.  Chest x-ray is suggestive of a left lower lobe pneumonia.  She will be treated for this with azithromycin.  This should also cover any sinus bacteria.  Dosing of over-the-counter ibuprofen and Tylenol was provided.  A list of over-the-counter decongestants was provided as well.      Diagnosis:    ICD-10-CM    1. Pneumonia of left lower lobe due to infectious organism  J18.9    2. Sinus congestion  R09.81    3. Sinus tachycardia  R00.0        Discharge Medications:  New Prescriptions    AZITHROMYCIN (ZITHROMAX) 250 MG TABLET    500mg (2 tabs) on Day 1 then 250mg (1 tab) on Days 2-5       Scribe Disclosure:  Lori CELIS, am serving as a scribe at 9:51 AM on 5/18/2022 to document services personally  performed by Adry Hayes, based on my observations and the provider's statements to me.          Adry Hayes MD  05/18/22 [id number]

## 2022-05-18 NOTE — Clinical Note
Nhi Alexander was seen and treated in our emergency department on 5/18/2022.  She may return to work on 05/21/2022.  Not clear to return to work today.  Needs improving symptoms which is expected in the next 2-3 days.     If you have any questions or concerns, please don't hesitate to call.      Adry Hayes MD

## 2022-05-18 NOTE — ED NOTES
Ambulated to the bathroom. Tolerated well. Reports feeling slightly better - less nauseous and headache improved.

## 2022-07-25 NOTE — LETTER
Date:January 20, 2020      Patient was self referred, no letter generated. Do not send.        [facility] Information       I have reviewed and agree to the contents of the note written by the occupational therapy assistant.     Adriel Garcia No

## 2022-07-26 ENCOUNTER — APPOINTMENT (OUTPATIENT)
Dept: GENERAL RADIOLOGY | Facility: CLINIC | Age: 37
End: 2022-07-26
Attending: EMERGENCY MEDICINE
Payer: COMMERCIAL

## 2022-07-26 ENCOUNTER — APPOINTMENT (OUTPATIENT)
Dept: CT IMAGING | Facility: CLINIC | Age: 37
End: 2022-07-26
Attending: PHYSICIAN ASSISTANT
Payer: COMMERCIAL

## 2022-07-26 ENCOUNTER — HOSPITAL ENCOUNTER (EMERGENCY)
Facility: CLINIC | Age: 37
Discharge: HOME | End: 2022-07-26
Attending: EMERGENCY MEDICINE | Admitting: EMERGENCY MEDICINE
Payer: COMMERCIAL

## 2022-07-26 VITALS
WEIGHT: 260 LBS | HEIGHT: 66 IN | HEART RATE: 64 BPM | SYSTOLIC BLOOD PRESSURE: 118 MMHG | DIASTOLIC BLOOD PRESSURE: 88 MMHG | OXYGEN SATURATION: 100 % | RESPIRATION RATE: 16 BRPM | TEMPERATURE: 97.7 F | BODY MASS INDEX: 41.78 KG/M2

## 2022-07-26 DIAGNOSIS — H92.03 OTALGIA, BILATERAL: ICD-10-CM

## 2022-07-26 DIAGNOSIS — M54.9 BACK PAIN, UNSPECIFIED BACK LOCATION, UNSPECIFIED BACK PAIN LATERALITY, UNSPECIFIED CHRONICITY: ICD-10-CM

## 2022-07-26 DIAGNOSIS — U07.1 INFECTION DUE TO 2019 NOVEL CORONAVIRUS: ICD-10-CM

## 2022-07-26 LAB
ANION GAP SERPL CALCULATED.3IONS-SCNC: 4 MMOL/L (ref 3–14)
ATRIAL RATE - MUSE: 70 BPM
BASOPHILS # BLD AUTO: 0 10E3/UL (ref 0–0.2)
BASOPHILS NFR BLD AUTO: 0 %
BUN SERPL-MCNC: 9 MG/DL (ref 7–30)
CALCIUM SERPL-MCNC: 9.3 MG/DL (ref 8.5–10.1)
CHLORIDE BLD-SCNC: 103 MMOL/L (ref 94–109)
CO2 SERPL-SCNC: 28 MMOL/L (ref 20–32)
CREAT SERPL-MCNC: 0.76 MG/DL (ref 0.52–1.04)
D DIMER PPP FEU-MCNC: 0.58 UG/ML FEU (ref 0–0.5)
DIASTOLIC BLOOD PRESSURE - MUSE: NORMAL MMHG
EOSINOPHIL # BLD AUTO: 0.2 10E3/UL (ref 0–0.7)
EOSINOPHIL NFR BLD AUTO: 3 %
ERYTHROCYTE [DISTWIDTH] IN BLOOD BY AUTOMATED COUNT: 13.2 % (ref 10–15)
GFR SERPL CREATININE-BSD FRML MDRD: >90 ML/MIN/1.73M2
GLUCOSE BLD-MCNC: 92 MG/DL (ref 70–99)
HCG SERPL QL: NEGATIVE
HCT VFR BLD AUTO: 46.8 % (ref 35–47)
HGB BLD-MCNC: 15.4 G/DL (ref 11.7–15.7)
IMM GRANULOCYTES # BLD: 0 10E3/UL
IMM GRANULOCYTES NFR BLD: 0 %
INTERPRETATION ECG - MUSE: NORMAL
LYMPHOCYTES # BLD AUTO: 1.7 10E3/UL (ref 0.8–5.3)
LYMPHOCYTES NFR BLD AUTO: 28 %
MCH RBC QN AUTO: 29.5 PG (ref 26.5–33)
MCHC RBC AUTO-ENTMCNC: 32.9 G/DL (ref 31.5–36.5)
MCV RBC AUTO: 90 FL (ref 78–100)
MONOCYTES # BLD AUTO: 0.6 10E3/UL (ref 0–1.3)
MONOCYTES NFR BLD AUTO: 9 %
NEUTROPHILS # BLD AUTO: 3.5 10E3/UL (ref 1.6–8.3)
NEUTROPHILS NFR BLD AUTO: 60 %
NRBC # BLD AUTO: 0 10E3/UL
NRBC BLD AUTO-RTO: 0 /100
P AXIS - MUSE: 41 DEGREES
PLATELET # BLD AUTO: 268 10E3/UL (ref 150–450)
POTASSIUM BLD-SCNC: 4 MMOL/L (ref 3.4–5.3)
PR INTERVAL - MUSE: 150 MS
QRS DURATION - MUSE: 74 MS
QT - MUSE: 386 MS
QTC - MUSE: 416 MS
R AXIS - MUSE: 56 DEGREES
RBC # BLD AUTO: 5.22 10E6/UL (ref 3.8–5.2)
SODIUM SERPL-SCNC: 135 MMOL/L (ref 133–144)
SYSTOLIC BLOOD PRESSURE - MUSE: NORMAL MMHG
T AXIS - MUSE: 49 DEGREES
TROPONIN I SERPL HS-MCNC: <3 NG/L
VENTRICULAR RATE- MUSE: 70 BPM
WBC # BLD AUTO: 6 10E3/UL (ref 4–11)

## 2022-07-26 PROCEDURE — 93005 ELECTROCARDIOGRAM TRACING: CPT

## 2022-07-26 PROCEDURE — 85025 COMPLETE CBC W/AUTO DIFF WBC: CPT | Performed by: PHYSICIAN ASSISTANT

## 2022-07-26 PROCEDURE — 85379 FIBRIN DEGRADATION QUANT: CPT | Performed by: PHYSICIAN ASSISTANT

## 2022-07-26 PROCEDURE — 99285 EMERGENCY DEPT VISIT HI MDM: CPT | Mod: 25

## 2022-07-26 PROCEDURE — 250N000009 HC RX 250: Performed by: PHYSICIAN ASSISTANT

## 2022-07-26 PROCEDURE — 96360 HYDRATION IV INFUSION INIT: CPT | Mod: 59

## 2022-07-26 PROCEDURE — 84484 ASSAY OF TROPONIN QUANT: CPT | Performed by: PHYSICIAN ASSISTANT

## 2022-07-26 PROCEDURE — 71046 X-RAY EXAM CHEST 2 VIEWS: CPT

## 2022-07-26 PROCEDURE — 84703 CHORIONIC GONADOTROPIN ASSAY: CPT | Performed by: PHYSICIAN ASSISTANT

## 2022-07-26 PROCEDURE — 36415 COLL VENOUS BLD VENIPUNCTURE: CPT | Performed by: PHYSICIAN ASSISTANT

## 2022-07-26 PROCEDURE — 250N000011 HC RX IP 250 OP 636: Performed by: PHYSICIAN ASSISTANT

## 2022-07-26 PROCEDURE — 71275 CT ANGIOGRAPHY CHEST: CPT

## 2022-07-26 PROCEDURE — 80048 BASIC METABOLIC PNL TOTAL CA: CPT | Performed by: PHYSICIAN ASSISTANT

## 2022-07-26 PROCEDURE — 96361 HYDRATE IV INFUSION ADD-ON: CPT

## 2022-07-26 RX ORDER — IOPAMIDOL 755 MG/ML
80 INJECTION, SOLUTION INTRAVASCULAR ONCE
Status: COMPLETED | OUTPATIENT
Start: 2022-07-26 | End: 2022-07-26

## 2022-07-26 RX ORDER — CYCLOBENZAPRINE HCL 10 MG
10 TABLET ORAL 3 TIMES DAILY PRN
Qty: 20 TABLET | Refills: 0 | Status: SHIPPED | OUTPATIENT
Start: 2022-07-26 | End: 2022-08-01

## 2022-07-26 RX ADMIN — IOPAMIDOL 80 ML: 755 INJECTION, SOLUTION INTRAVENOUS at 16:38

## 2022-07-26 RX ADMIN — SODIUM CHLORIDE 100 ML: 900 INJECTION INTRAVENOUS at 16:38

## 2022-07-26 ASSESSMENT — ENCOUNTER SYMPTOMS
COUGH: 1
CHILLS: 1
DIARRHEA: 0
SHORTNESS OF BREATH: 0
BACK PAIN: 1
SORE THROAT: 0
VOMITING: 0

## 2022-07-26 NOTE — DISCHARGE INSTRUCTIONS
Discharge Instructions  COVID-19    COVID-19 is the disease caused by a new coronavirus. The virus spreads from person-to-person primarily by droplets when an infected person coughs or sneezes and the droplets are then breathed in by another person.    Symptoms of COVID-19  Many people have no symptoms or mild symptoms.  Symptoms usually appear within a few days, but up to 14-days, after contact with a person with COVID-19.    A mild COVID-19 illness is like a cold and can have fever, cough, sneezing, sore throat, tiredness, headache, and muscle pain.    A moderate COVID-19 illness might include shortness of breath or pneumonia on a chest x-ray.    A severe COVID-19 illness causes significant breathing problems such as low oxygen levels or more serious pneumonia.  Some patients experience loss of taste or smell which is somewhat unique to COVID-19.      Isolation and Quarantine  Testing is recommended for any person with symptoms that could be COVID-19 and often for those exposed to COVID-19. The best way to stop the spread of the virus is to avoid contact with others.    A close contact exposure is being within 6 feet of someone with COVID for 15 minutes.    Isolation refers to sick people staying away from people who are not sick.    A person in quarantine is limiting activity because they were exposed and are waiting to see if they might become sick.    If you test positive for COVID and have no symptoms, you should stay home (isolation) for 5 full days after the day of the test. You should then wear a mask when around others for another 5 days.    If you test positive for COVID and have mild symptoms, you should stay home (isolation) for at least 5 days after your symptoms began. You can return to normal activities at that time, wearing a mask when around others, for another 5 days as long as your symptoms are improving/resolving and you have been without a fever for 24 hours (without using fever-reducing  medicine).    If you test positive for COVID and have more than mild symptoms, you should stay home (isolation) for at least 10 days after your symptoms began. You can return to normal activities at that time as long as your symptoms are improving and you have been without a fever for 24 hours (without using fever-reducing medicine).  For example, if you have a fever and cough for 6 days, you need to stay home 4 more days with no fever for a total of 10 days. Or, if you have a fever and cough for 10 days, you need to stay home one more day with no fever for a total of 11 days.    If you were exposed to COVID and are not vaccinated (or it has been more than six months from your Pfizer or Moderna vaccine or two months from J&J vaccine), you should stay home (quarantine) for 5 days and then wear a mask around others for 5 additional days. A COVID test at day 5 is recommended.    If you were exposed to COVID and are vaccinated (had a booster, had two shots of Pfizer or Moderna vaccine in the last five months, or had J&J vaccine within two months), you do not need to quarantine but should wear a mask around others for 10 days and get a COVID test on day 5.    If you have symptoms but a negative test, you should stay at home until you have mild/improving symptoms and are without fever for 24 hours, using the same judgment you would for when it is safe to return to work/school from strep throat, influenza, or the common cold. If you worsen, you should consider being re-evaluated.    If you are being tested for COVID because of symptoms and your test is pending, you should stay home until you know your test result.  More details on isolation and quarantine can be found on this website from the CDC:  https://www.cdc.gov/coronavirus/2019-ncov/your-health/quarantine-isolation.html    If I have COVID, how should I protect myself and others?    Do not go to work or school. Have a friend or relative do your shopping. Do not use  public transportation (bus, train) or ridesharing (Lyft, Uber).    Separate yourself from other people in your home. As much as possible, you should stay in one room and away from other people in your home. Also, use a separate bathroom, if possible. Avoid handling pets or other animals while sick.     Wear a facemask if you need to be around other people and cover your mouth and nose with a tissue when you cough or sneeze.     Avoid sharing personal household items. You should not share dishes, drinking glasses, forks/knives/spoons, towels, or bedding with other people in your home. After using these items, they should be washed with soap and water. Clean parts of your home that are touched often (doorknobs, faucets, countertops, etc.) daily.     Wash your hands often with soap and water for at least 20 seconds or use an alcohol-based hand  containing at least 60% alcohol.     Avoid touching your face.    Treat your symptoms. You can take Acetaminophen (Tylenol) to treat body aches and fever as needed for comfort. Ibuprofen (Advil or Motrin) can be used as well if you still have symptoms after taking Tylenol. Drink fluids. Rest.    Watch for worsening symptoms such as shortness of breath/difficulty breathing or very severe weakness.    Employers/workplaces are being asked by the Centers for Disease Control (CDC) to not request notes/documentation for you to return to work or prove that you were ill. You may choose to show your employer this paperwork. Also, repeat testing should not be required to return to work.    Exercise/Sports in rare cases, COVID could affect your heart in a way that makes exercise or participation in sports dangerous.  If you have a mild COVID illness (fever, cough, sore throat, and similar symptoms but no difficulty breathing or abnormalities of the lung): After your COVID symptoms have resolved, wait 14-days before returning to activity.  If you have more than a mild illness  (meaning that you have problems with your breathing or lungs) or if you participate in competitive or strenuous activity or have a history of heart disease: Please see your primary doctor/provider prior to return to activity/competition.    COVID treatments such as antiviral and antibody medications are available. They are recommended for those patients who have a risk for developing more severe COVID illness. Importantly, the treatments must be started early in the illness (within 5-7 days, depending on which treatment). These treatments may have been considered today during your visit. If you have other questions, contact your primary doctor/clinic.     You can learn more about COVID treatments from the [facility]:  https://www.health.[facility]./diseases/coronavirus/meds.html    Return to the Emergency Department if:    If you are developing worsening breathing, shortness of breath, or feel worse you should seek medical attention.  If you are uncertain, contact your health care provider/clinic. If you need emergency medical attention, call 911 and tell them you have been ill.  Discharge Instructions  Back Pain  You were seen today for back pain. Back pain can have many causes, but most will get better without surgery or other specific treatment. Sometimes there is a herniated ( slipped ) disc. We do not usually do MRI scans to look for these right away, since most herniated discs will get better on their own with time.  Today, we did not find any evidence that your back pain was caused by a serious condition. However, sometimes symptoms develop over time and cannot be found during an emergency visit, so it is very important that you follow up with your primary provider.  Generally, every Emergency Department visit should have a follow-up clinic visit with either a primary or a specialty clinic/provider. Please follow-up as instructed by your emergency provider today.    Return to the Emergency  Department if:  You develop a fever with your back pain.   You have weakness or change in sensation in one or both legs.  You lose control of your bowels or bladder, or cannot empty your bladder (cannot pee).  Your pain gets much worse.     Follow-up with your provider:  Unless your pain has completely gone away, please make an appointment with your provider within one week. Most of the routine care for back pain is available in a clinic and not the Emergency Department. You may need further management of your back pain, such as more pain medication, imaging such as an X-ray or MRI, or physical therapy.    What can I do to help myself?  Remain Active -- People are often afraid that they will hurt their back further or delay recovery by remaining active, but this is one of the best things you can do for your back. In fact, staying in bed for a long time to rest is not recommended. Studies have shown that people with low back pain recover faster when they remain active. Movement helps to bring blood flow to the muscles and relieve muscle spasms as well as preventing loss of muscle strength.  Heat -- Using a heating pad can help with low back pain during the first few weeks. Do not sleep with a heating pad, as you can be burned.   Pain medications - You may take a pain medication such as Tylenol  (acetaminophen), Advil , Motrin  (ibuprofen) or Aleve  (naproxen).  If you were given a prescription for medicine here today, be sure to read all of the information (including the package insert) that comes with your prescription.  This will include important information about the medicine, its side effects, and any warnings that you need to know about.  The pharmacist who fills the prescription can provide more information and answer questions you may have about the medicine.  If you have questions or concerns that the pharmacist cannot address, please call or return to the Emergency Department.   Remember that you can always  come back to the Emergency Department if you are not able to see your regular provider in the amount of time listed above, if you get any new symptoms, or if there is anything that worries you.

## 2022-07-26 NOTE — ED NOTES
Rapid Assessment Note    History:   Nhi Alexander is a 37 year old female who presents with   covid concern and back pain. The patient reports that she had covid concern on thursday and she tested positive for covid on saturday. She reports waking up with upper R backpain today. She denies having trouble with breathing, no CP, no pleuritic pain.      Exam:   General:  Alert, interactive  Cardiovascular:  Well perfused  Lungs:  No respiratory distress, no accessory muscle use  Neuro:  Moving all 4 extremities  Skin:  Warm, dry  Psych:  Normal affect      Plan of Care:   I evaluated the patient and developed an initial plan of care. I discussed this plan and explained that I, or one of my partners, would be returning to complete the evaluation.     I, Yari Patel MD, am serving as a scribe to document services personally performed by Yari Patel* , based on my observations and the provider's statements to me.    07/26/2022  [employer]    Portions of this medical record were completed by a scribe. UPON MY REVIEW AND AUTHENTICATION BY ELECTRONIC SIGNATURE, this confirms (a) I performed the applicable clinical services, and (b) the record is accurate.        Yari Patel MD  07/26/22 [id number]

## 2022-07-26 NOTE — ED PROVIDER NOTES
"  History   Chief Complaint:  Covid Concern       The history is provided by the patient.      Nhi Alexander is a 37 year old female who presents with back pain and covid symptoms. The patient reports that 5 days ago she developed Covid symptoms and tested positive for Covid. She reports she also had pneumonia a few month ago.  The patient says that she developed an upper right back pain this morning and describes the pain as dull and constant. She endorses body aches, chills, cough, and congestion. The patient denies sore throat, chest pain, shortness of breath, nausea, vomiting and diarrhea.      Review of Systems   Constitutional: Positive for chills.   HENT: Positive for congestion. Negative for sore throat.    Respiratory: Positive for cough. Negative for shortness of breath.    Gastrointestinal: Negative for diarrhea and vomiting.   Musculoskeletal: Positive for back pain (upper right).   All other systems reviewed and are negative.    Allergies:  No Known Allergies    Medications:  Zithromax     Past Medical History:     Infertility  Morbid obesity  Preeclampsia     Past Surgical History:      DaVinci lysis of adhesions  DaVinci pelvic procedure  Dilation and curettage  Laparoscopic tubal dye study    Family History:    No family history on file    Social History:  Patient unaccompanied    Physical Exam     Patient Vitals for the past 24 hrs:   BP Temp Temp src Pulse Resp SpO2 Height Weight   22 1645 118/88 -- -- 64 16 100 % -- --   22 1330 (!) 160/93 97.7  F (36.5  C) Oral 77 16 98 % 1.676 m (5' 6\") 117.9 kg (260 lb)       Physical Exam  General: Well appearing, pleasant female, resting on exam bed  HEENT: No evidence of trauma.  Conjunctive are clear. Neck range of motion intact.  Nose and throat clear.  Left TM is mildly erythematous.  Right is normal.  Respiratory: Good effort  Cardiovascular: Good distal perfusion  Gastrointestinal: Nondistended  Musculoskeletal: Atraumatic.  No " thoracic tenderness.  Skin: Exposed skin clear.  Neurologic: Alert.  Psych:  Patient is cooperative, with normal affect.    Emergency Department Course   ECG  ECG results from 07/26/22   EKG 12-lead, tracing only     Value    Systolic Blood Pressure     Diastolic Blood Pressure     Ventricular Rate 70    Atrial Rate 70    FL Interval 150    QRS Duration 74        QTc 416    P Axis 41    R AXIS 56    T Axis 49    Interpretation ECG      Sinus rhythm  Normal ECG  When compared with ECG of 18-MAY-2022 10:29,  No significant change was found  Confirmed by GENERATED REPORT, COMPUTER ([id number]),  Damaris Donohue ([id number]) on 7/26/2022 2:02:31 PM         Imaging:  CT Chest Pulmonary Embolism w Contrast   Final Result   IMPRESSION:   1.  No evidence of pulmonary embolus or other acute abnormality in the chest.      XR Chest 2 Views   Final Result   IMPRESSION: No acute cardiopulmonary disease.      LAKEISHA YARBROUGH MD            SYSTEM ID:  [id number]        Report per radiology    Laboratory:  Labs Ordered and Resulted from Time of ED Arrival to Time of ED Departure   D DIMER QUANTITATIVE - Abnormal       Result Value    D-Dimer Quantitative 0.58 (*)    CBC WITH PLATELETS AND DIFFERENTIAL - Abnormal    WBC Count 6.0      RBC Count 5.22 (*)     Hemoglobin 15.4      Hematocrit 46.8      MCV 90      MCH 29.5      MCHC 32.9      RDW 13.2      Platelet Count 268      % Neutrophils 60      % Lymphocytes 28      % Monocytes 9      % Eosinophils 3      % Basophils 0      % Immature Granulocytes 0      NRBCs per 100 WBC 0      Absolute Neutrophils 3.5      Absolute Lymphocytes 1.7      Absolute Monocytes 0.6      Absolute Eosinophils 0.2      Absolute Basophils 0.0      Absolute Immature Granulocytes 0.0      Absolute NRBCs 0.0     BASIC METABOLIC PANEL - Normal    Sodium 135      Potassium 4.0      Chloride 103      Carbon Dioxide (CO2) 28      Anion Gap 4      Urea Nitrogen 9      Creatinine 0.76      Calcium 9.3      Glucose  92      GFR Estimate >90     TROPONIN I - Normal    Troponin I High Sensitivity <3     HCG QUALITATIVE PREGNANCY - Normal    hCG Serum Qualitative Negative          Emergency Department Course:     Reviewed:  I reviewed nursing notes, vitals, past medical history and Care Everywhere    Assessments:  1523 I obtained history and examined the patient as noted above.   1356 I rechecked the patient and explained findings.   1710 I rechecked the patient and explained findings.    Disposition:  The patient was discharged to home.     Impression & Plan     Medical Decision Making:  Nhi Alexander is a 37 year old female presents emergency room today for evaluation of improving URI symptoms in addition to right-sided thoracic back pain that started last evening in the setting of COVID-19.  See HPI.  Her vitals are unremarkable.  She has a reassuring exam and is in no acute distress.  Her EKG is unremarkable.  CBC, BMP, troponin, unremarkable.  Her dimer was elevated so she went for a PE run that reveals no acute abnormalities.  We will manage her conservatively with ibuprofen, Tylenol, Flexeril.  She is to return with new or worsening symptoms.  She also noted that her ears felt full and on exam she had a little erythema of her left TM.  We will provide a wait-and-see antibiotic should she be suffering from an early ear infection also.  She is comfortable with plan and has no further questions.    Diagnosis:    ICD-10-CM    1. Infection due to 2019 novel coronavirus  U07.1    2. Back pain, unspecified back location, unspecified back pain laterality, unspecified chronicity  M54.9    3. Otalgia, bilateral  H92.03        Discharge Medications:  Discharge Medication List as of 7/26/2022  5:18 PM      START taking these medications    Details   amoxicillin-clavulanate (AUGMENTIN) 875-125 MG tablet Take 1 tablet by mouth 2 times daily for 7 days, Disp-14 tablet, R-0, E-Prescribe      cyclobenzaprine (FLEXERIL) 10 MG tablet Take  1 tablet (10 mg) by mouth 3 times daily as needed for muscle spasms, Disp-20 tablet, R-0, E-Prescribe             Scribe Disclosure:  I, Pamela Cooper, am serving as a scribe at 3:17 PM on 7/26/2022 to document services personally performed by Kwame Galeas PA-C based on my observations and the provider's statements to me.          Kwame Galeas PA-C  07/26/22 [id number]

## 2022-11-19 ENCOUNTER — HEALTH MAINTENANCE LETTER (OUTPATIENT)
Age: 37
End: 2022-11-19

## 2022-11-20 ENCOUNTER — APPOINTMENT (OUTPATIENT)
Dept: GENERAL RADIOLOGY | Facility: CLINIC | Age: 37
End: 2022-11-20
Attending: EMERGENCY MEDICINE
Payer: COMMERCIAL

## 2022-11-20 ENCOUNTER — HOSPITAL ENCOUNTER (EMERGENCY)
Facility: CLINIC | Age: 37
Discharge: HOME | End: 2022-11-21
Attending: EMERGENCY MEDICINE | Admitting: EMERGENCY MEDICINE
Payer: COMMERCIAL

## 2022-11-20 DIAGNOSIS — J10.1 INFLUENZA A: ICD-10-CM

## 2022-11-20 DIAGNOSIS — J21.0 RSV BRONCHIOLITIS: ICD-10-CM

## 2022-11-20 LAB
FLUAV RNA SPEC QL NAA+PROBE: POSITIVE
FLUBV RNA RESP QL NAA+PROBE: NEGATIVE
RSV RNA SPEC NAA+PROBE: POSITIVE
SARS-COV-2 RNA RESP QL NAA+PROBE: NEGATIVE

## 2022-11-20 PROCEDURE — 87637 SARSCOV2&INF A&B&RSV AMP PRB: CPT | Performed by: EMERGENCY MEDICINE

## 2022-11-20 PROCEDURE — 94640 AIRWAY INHALATION TREATMENT: CPT

## 2022-11-20 PROCEDURE — 250N000012 HC RX MED GY IP 250 OP 636 PS 637: Performed by: EMERGENCY MEDICINE

## 2022-11-20 PROCEDURE — 250N000009 HC RX 250: Performed by: EMERGENCY MEDICINE

## 2022-11-20 PROCEDURE — 99285 EMERGENCY DEPT VISIT HI MDM: CPT | Mod: CS,25

## 2022-11-20 PROCEDURE — 71046 X-RAY EXAM CHEST 2 VIEWS: CPT

## 2022-11-20 PROCEDURE — C9803 HOPD COVID-19 SPEC COLLECT: HCPCS

## 2022-11-20 RX ORDER — ALBUTEROL SULFATE 90 UG/1
6 AEROSOL, METERED RESPIRATORY (INHALATION) ONCE
Status: COMPLETED | OUTPATIENT
Start: 2022-11-20 | End: 2022-11-21

## 2022-11-20 RX ORDER — PREDNISONE 20 MG/1
TABLET ORAL
Qty: 10 TABLET | Refills: 0 | Status: SHIPPED | OUTPATIENT
Start: 2022-11-20 | End: 2023-03-01

## 2022-11-20 RX ORDER — ALBUTEROL SULFATE 0.83 MG/ML
2.5 SOLUTION RESPIRATORY (INHALATION)
Status: DISCONTINUED | OUTPATIENT
Start: 2022-11-20 | End: 2022-11-21 | Stop reason: HOSPADM

## 2022-11-20 RX ORDER — ALBUTEROL SULFATE 0.83 MG/ML
2.5 SOLUTION RESPIRATORY (INHALATION) EVERY 4 HOURS PRN
Qty: 90 ML | Refills: 0 | Status: SHIPPED | OUTPATIENT
Start: 2022-11-20

## 2022-11-20 RX ORDER — PREDNISONE 20 MG/1
40 TABLET ORAL ONCE
Status: COMPLETED | OUTPATIENT
Start: 2022-11-20 | End: 2022-11-20

## 2022-11-20 RX ADMIN — ALBUTEROL SULFATE 2.5 MG: 2.5 SOLUTION RESPIRATORY (INHALATION) at 22:05

## 2022-11-20 RX ADMIN — ALBUTEROL SULFATE 2.5 MG: 2.5 SOLUTION RESPIRATORY (INHALATION) at 23:34

## 2022-11-20 RX ADMIN — PREDNISONE 40 MG: 20 TABLET ORAL at 23:33

## 2022-11-20 ASSESSMENT — ACTIVITIES OF DAILY LIVING (ADL): ADLS_ACUITY_SCORE: 35

## 2022-11-21 VITALS
OXYGEN SATURATION: 96 % | DIASTOLIC BLOOD PRESSURE: 77 MMHG | RESPIRATION RATE: 20 BRPM | HEIGHT: 67 IN | WEIGHT: 250 LBS | HEART RATE: 73 BPM | SYSTOLIC BLOOD PRESSURE: 126 MMHG | BODY MASS INDEX: 39.24 KG/M2 | TEMPERATURE: 97.8 F

## 2022-11-21 PROCEDURE — 250N000013 HC RX MED GY IP 250 OP 250 PS 637: Performed by: EMERGENCY MEDICINE

## 2022-11-21 RX ADMIN — ALBUTEROL SULFATE 6 PUFF: 90 AEROSOL, METERED RESPIRATORY (INHALATION) at 00:00

## 2022-11-21 NOTE — DISCHARGE INSTRUCTIONS
Please return to the ED if you have worsening cough, fevers >101, chest pain, shortness of breath, intractable vomiting, or other acute changes.  Please follow up with your PCP in the next 2-3 days.      Use tylenol and ibuprofen for pain.  Drink plenty of fluids and rest.      We have prescribed a short burst of steroids.  This should help with some of the inflammation as well.  You may use albuterol every 4-6 hours as needed for shortness of breath, cough while awake.  It may be beneficial to do this regularly for the next 2 to 3 days and then wean as feeling better.    You may use nebulizer or inhaler (2-6 puffs) every 4-6 hours    Discharge Instructions  Influenza    You were diagnosed today with influenza or influenza like illness.  Influenza is a respiratory (breathing) illness caused by influenza A or B viruses.  Influenza causes five primary symptoms: fever, headache, muscle aches/fatigue/malaise, sore throat and cough.  These symptoms start one to four days after you have been around a person with this illness. Influenza is spread through sneezing and coughing and can live on surfaces for several days.  It is usually contagious for 5 days but in some cases up to 10 days and often affects several family members. If you have a family member who is less than 2 years old, older than 65 years old, pregnant or has a serious medical condition, they should be seen right away by a provider to decide if they should take preventative medications. Although influenza will make you feel very ill, most patients don t require any specific treatment. An antiviral medication might be prescribed for certain groups of patients (older patients, younger patients, and those with certain chronic medical problems).    Generally, every Emergency Department visit should have a follow-up clinic visit with either a primary or a specialty clinic/provider. Please follow-up as instructed by your emergency provider today.    Return to the  Emergency Department if:  You have trouble breathing.  You develop pain in your chest.  You have signs of being dehydrated, such as dizziness or unable to urinate (pee) at least three times daily.  You are confused or severely weak.  You cannot stop vomiting (throwing up) or you cannot drink enough fluids.    In children, you should seek help if the child has any of the above or if child:  Has blue or purplish skin color.  Is so irritable that he or she does not want to be held.  Does not have tears when crying (in infants) or does not urinate at least three times daily.  Does not wake up easily.    What can I do to help myself?  Rest.  Fluids -- Drink hydrating solutions such as Gatorade  or Pedialyte  as often as you can. If you are drinking enough, you should pass urine at least every eight hours.  Tylenol  (acetaminophen) and Advil  (ibuprofen) can relieve fever, headache, and muscle aches. Do not give aspirin to children under 18 years old.   Antiviral treatment -- Antiviral medicines do not make the flu symptoms go away immediately.  They have only been shown to make the symptoms go away 12 to 24 hours sooner than they would without treatment.     Antibiotics -- Antibiotics are NOT useful for treating viral illnesses such as influenza. Antibiotics should only be used if there is a bacterial complication of the flu such as bacterial pneumonia, ear infection, or sinusitis.  Because you were diagnosed with a flu like illness you are very contagious.  This means you cannot work, attend school or  for at least 24 hours or until you no longer have a fever.  If you were given a prescription for medicine here today, be sure to read all of the information (including the package insert) that comes with your prescription.  This will include important information about the medicine, its side effects, and any warnings that you need to know about.  The pharmacist who fills the prescription can provide more  information and answer questions you may have about the medicine.  If you have questions or concerns that the pharmacist cannot address, please call or return to the Emergency Department.   Remember that you can always come back to the Emergency Department if you are not able to see your regular provider in the amount of time listed above, if you get any new symptoms, or if there is anything that worries you.

## 2022-11-21 NOTE — ED PROVIDER NOTES
"  History   Chief Complaint:  Shortness of Breath (Daughter has RSV)       The history is provided by the patient.      Nhi Alexander is a 37 year old female who presents with shortness of breath. The patient reports that 5 days ago, she developed cough and trouble breathing. She mentions her daughter having RSV for the past week. The patient says that she is unable to sleep because she cannot breathe while laying down. She endorses shortness of breath, wheezing/tightness, vomiting and sinus congestion. The patient denies diarrhea, chest pain, and fever. Patient mentions having pneumonia a few months ago.     Review of Systems   All other systems reviewed and are negative.    Allergies:  Penicillins    Medications:  Zithromax  Nitrofurantoin  Zoloft  Flexeril  Mirena  Aldactone  Ativan    Past Medical History:     Obesity  Preeclampsia  Anxiety  Depression    Past Surgical History:      DaVinci lysis of adhesions  DaVinci pelvic procedure  Dilation and curettage  Laparoscopic tubal dye    Social History:  Patient unaccompanied.  PCP: No Ref-Primary, Physician     Physical Exam     Patient Vitals for the past 24 hrs:   BP Temp Temp src Pulse Resp SpO2 Height Weight   22 2230 (!) 142/99 -- -- 79 -- 97 % -- --   22 2200 139/84 -- -- 75 22 96 % -- --   22 1915 (!) 156/79 98.4  F (36.9  C) Temporal 91 20 97 % 1.702 m (5' 7\") 113.4 kg (250 lb)       Physical Exam  General: Resting on the bed.  Head: No obvious trauma to head.  Ears, Nose, Throat:  External ears normal.  Nose normal.  No pharyngeal erythema, swelling or exudate.  Midline uvula.  Clear TMs.  Eyes:  Conjunctivae clear.  Pupils are equal, round, and reactive.   Neck: Normal range of motion.  Neck supple. No notable rigidity.  CV: Regular rate and rhythm.  No murmurs.      Respiratory: Effort normal and breath sounds with mild diffuse wheezing noted.  No focal crackles.  Gastrointestinal: Soft.  No distension. There is no " tenderness.  There is no rigidity, no rebound and no guarding.   Musculoskeletal: Non tender non edematous calves  Neuro: Alert. Moving all extremities appropriately.  Normal speech.    Skin: Skin is warm and dry.  No rash noted.       Emergency Department Course         Imaging:  XR Chest 2 Views   Final Result   IMPRESSION: There is some subtle bibasilar and left perihilar infiltrates seen which may represent findings of peribronchial inflammation. No consolidative infiltrates identified. The chest is otherwise normal. The infiltrates are new since 2022.        Report per radiology    Laboratory:  Labs Ordered and Resulted from Time of ED Arrival to Time of ED Departure   INFLUENZA A/B & SARS-COV2 PCR MULTIPLEX - Abnormal       Result Value    Influenza A PCR Positive (*)     Influenza B PCR Negative      RSV PCR Positive (*)     SARS CoV2 PCR Negative          Procedures      Emergency Department Course:     Reviewed:  I reviewed nursing notes, vitals, past medical history and Care Everywhere    Assessments:  214 I obtained history and examined the patient as noted above.   2310 I rechecked the patient and explained findings.   2345 I rechecked the patient and explained results      Interventions:  Medications   albuterol (PROVENTIL) neb solution 2.5 mg (2.5 mg Nebulization Given 225)   albuterol (PROVENTIL) neb solution 2.5 mg (2.5 mg Nebulization Given 22 2334)   albuterol (PROVENTIL HFA/VENTOLIN HFA) inhaler (has no administration in time range)   predniSONE (DELTASONE) tablet 40 mg (40 mg Oral Given 22 2333)       Disposition:  The patient was discharged to home.     Impression & Plan     Medical Decision Makin-year-old female otherwise healthy presents with cough, congestion.  Vital signs are reassuring.  Broad differentials pursued include not limited to pneumonia, pneumothorax, effusion, PE, ACS, URI, influenza, COVID-19, RSV, dehydration, sepsis, etc.  Vital signs are  stable.  No evidence of sepsis.  Afebrile.  Considered PE but no hypoxia, tachycardia or indication of PE.  No chest pain to indicate ACS or arrhythmia.  Symptoms seem much more consistent with a viral URI.  Influenza and RSV swab are both positive.  COVID is negative.  Patient does have some mild wheezing suspect bronchospasm in the setting of URI.  Patient has been sick for 5 days therefore is outside the window for Tamiflu.  Chest x-ray is obtained given the ongoing shortness of breath.  Chest x-ray shows peribronchial inflammation but no evidence of consolidative pneumonia.  Clinically given that she has influenza and RSV I think this is most likely related to a viral illness as opposed to bacterial illness.  At this point I do not see clear indication for antibiotics.  Recommend close follow-up with PCP in 2 to 3 days.  we did try albuterol nebulizer to help with symptoms.  After first nebulizer there is additional wheezing therefore offered a second nebulizer as well as some prednisone.  Patient also be given inhaler to use at home versus nebulizer as needed.  No hypoxia, no retractions, no increased work of breathing.  No indication for hospitalization.  Patient eating and drinking well.  No signs of dehydration.  Recommend supportive care, humidifier, as needed albuterol.  Close follow-up with PCP.  Return precautions were given and patient was discharged home.    Diagnosis:    ICD-10-CM    1. RSV bronchiolitis  J21.0       2. Influenza A  J10.1           Discharge Medications:  New Prescriptions    ALBUTEROL (PROVENTIL) (2.5 MG/3ML) 0.083% NEB SOLUTION    Take 1 vial (2.5 mg) by nebulization every 4 hours as needed for shortness of breath / dyspnea or wheezing    PREDNISONE (DELTASONE) 20 MG TABLET    Take two tablets (= 40mg) each day for 5 (five) days       Scribe Disclosure:  Pamela CELIS, am serving as a scribe at 9:33 PM on 11/20/2022 to document services personally performed by Dina Lomeli  MD ELIUD based on my observations and the provider's statements to me.            Dina Lomeli MD  11/20/22 [id number]

## 2023-01-20 ENCOUNTER — TELEPHONE (OUTPATIENT)
Dept: OBGYN | Facility: CLINIC | Age: 38
End: 2023-01-20
Payer: COMMERCIAL

## 2023-01-20 NOTE — TELEPHONE ENCOUNTER
Patient asked to speak with a nurse regarding her IUD and when she should come in to replace it? Please call back.

## 2023-01-20 NOTE — TELEPHONE ENCOUNTER
Returned patient call. Reviewed new efficacy on MIRENA IUD.  Reviewed recommended follow-up with PAP, good through 2024.  Patient would like to schedule annual  Rouitng back to scheduling   Pt verbalized understanding, in agreement with plan, and voiced no further questions.  Jcarlos Childress RN on 1/20/2023 at 12:19 PM

## 2023-02-24 ENCOUNTER — OFFICE VISIT (OUTPATIENT)
Dept: OBGYN | Facility: CLINIC | Age: 38
End: 2023-02-24
Payer: COMMERCIAL

## 2023-02-24 VITALS — DIASTOLIC BLOOD PRESSURE: 78 MMHG | SYSTOLIC BLOOD PRESSURE: 130 MMHG

## 2023-02-24 DIAGNOSIS — L81.6 SKIN HYPOPIGMENTATION: ICD-10-CM

## 2023-02-24 DIAGNOSIS — R30.9 PAINFUL URINATION: Primary | ICD-10-CM

## 2023-02-24 LAB
ALBUMIN UR-MCNC: NEGATIVE MG/DL
APPEARANCE UR: CLEAR
BACTERIA #/AREA URNS HPF: ABNORMAL /HPF
BILIRUB UR QL STRIP: NEGATIVE
COLOR UR AUTO: YELLOW
GLUCOSE UR STRIP-MCNC: NEGATIVE MG/DL
HGB UR QL STRIP: ABNORMAL
KETONES UR STRIP-MCNC: NEGATIVE MG/DL
LEUKOCYTE ESTERASE UR QL STRIP: ABNORMAL
NITRATE UR QL: NEGATIVE
NUGENT SCORE: 4
PH UR STRIP: 7 [PH] (ref 5–7)
RBC #/AREA URNS AUTO: ABNORMAL /HPF
SP GR UR STRIP: 1.02 (ref 1–1.03)
SQUAMOUS #/AREA URNS AUTO: ABNORMAL /LPF
UROBILINOGEN UR STRIP-ACNC: 0.2 E.U./DL
WBC #/AREA URNS AUTO: ABNORMAL /HPF
WHITE BLOOD CELLS: ABNORMAL

## 2023-02-24 PROCEDURE — 87205 SMEAR GRAM STAIN: CPT | Performed by: NURSE PRACTITIONER

## 2023-02-24 PROCEDURE — 87102 FUNGUS ISOLATION CULTURE: CPT | Performed by: NURSE PRACTITIONER

## 2023-02-24 PROCEDURE — 87086 URINE CULTURE/COLONY COUNT: CPT | Performed by: NURSE PRACTITIONER

## 2023-02-24 PROCEDURE — 81001 URINALYSIS AUTO W/SCOPE: CPT | Performed by: NURSE PRACTITIONER

## 2023-02-24 PROCEDURE — 87109 MYCOPLASMA: CPT | Performed by: NURSE PRACTITIONER

## 2023-02-24 PROCEDURE — 99203 OFFICE O/P NEW LOW 30 MIN: CPT | Performed by: NURSE PRACTITIONER

## 2023-02-24 RX ORDER — CLOBETASOL PROPIONATE 0.5 MG/G
OINTMENT TOPICAL 2 TIMES DAILY
Qty: 60 G | Refills: 1 | Status: SHIPPED | OUTPATIENT
Start: 2023-02-24

## 2023-02-24 NOTE — PROGRESS NOTES
SUBJECTIVE:                                                   Nhi Alexander is a 38 year old female who presents to clinic today for the following health issue(s):  Patient presents with:  Vaginal Problem:  Noticed for 2 weeks Itching and pain in vaginal are        HPI:  Patient here today with concerns of external vulvar itching that has progressively gotten worse over the last 2 weeks.  She also has noticed some urethral discomfort and has been positive and treated for Ureaplasma about 10 years ago.    She is not currently sexually active and is using IUD.  She is amenorrheic.  She denies any pelvic pain or cramping.  She has no fever or chills.    She has a new diagnosis of eczema that is popping up on multiple places on her body.    No LMP recorded. (Menstrual status: IUD)..     Patient is not sexually active, .  Using IUD for contraception.    reports that she has never smoked. She has never used smokeless tobacco.    STD testing offered?  Declined    Health maintenance updated:  yes    Today's PHQ-2 Score:   PHQ-2 (  [employer]) 2020   Q1: Little interest or pleasure in doing things 0   Q2: Feeling down, depressed or hopeless 0   PHQ-2 Score 0   PHQ-2 Total Score (12-17 Years)- Positive if 3 or more points; Administer PHQ-A if positive 0     Today's PHQ-9 Score:   PHQ-9 SCORE 2019   PHQ-9 Total Score 1     Today's PATRICA-7 Score:   PATRICA-7 SCORE 2019   Total Score 0       Problem list and histories reviewed & adjusted, as indicated.  Additional history: as documented.    Patient Active Problem List   Diagnosis     Infertility, female     Obesity complicating pregnancy, second trimester     Indication for care in labor or delivery     Preeclampsia     S/P  section     Morbid obesity (H)     Past Surgical History:   Procedure Laterality Date      SECTION N/A 2017    Procedure:  SECTION;;  Surgeon: Shirley Alfredo MD;  Location: [facility]  ADHESIONS N/A 11/5/2015    Procedure: DAVINCI LYSIS OF ADHESIONS;  Surgeon: Kimberly Suarez MD;  Location: [facility] OR     DAVINCI PELVIC PROCEDURE N/A 11/5/2015    Procedure: DAVINCI XI PELVIC PROCEDURE;  Surgeon: Kimberly Suarez MD;  Location: [facility] OR     DILATION AND CURETTAGE, OPERATIVE HYSTEROSCOPY WITH MORCELLATOR, COMBINED N/A 11/5/2015    Procedure: COMBINED DILATION AND CURETTAGE, OPERATIVE HYSTEROSCOPY WITH MORCELLATOR;  Surgeon: Kimberly Suarez MD;  Location: [facility] OR     LAPAROSCOPIC TUBAL DYE STUDY Bilateral 11/5/2015    Procedure: LAPAROSCOPIC TUBAL DYE STUDY;  Surgeon: Kimberly Suarez MD;  Location:  OR     LAPAROSCOPY DIAGNOSTIC (GYN) N/A 11/5/2015    Procedure: LAPAROSCOPY DIAGNOSTIC (GYN);  Surgeon: Kimberly Suarez MD;  Location: [facility] OR      Social History     Tobacco Use     Smoking status: Never     Smokeless tobacco: Never   Substance Use Topics     Alcohol use: No      Problem (# of Occurrences) Relation (Name,Age of Onset)    Gynecology (2) Maternal Grandmother: endometriosis, hyst done age29's (approx), Maternal Aunt: endometriosis, hyst done age 30's (approx)    Family History Negative (2) Mother, Father       Negative family history of: Coronary Artery Disease, Hypertension, Diabetes, Cerebrovascular Disease            Current Outpatient Medications   Medication Sig     albuterol (PROVENTIL) (2.5 MG/3ML) 0.083% neb solution Take 1 vial (2.5 mg) by nebulization every 4 hours as needed for shortness of breath / dyspnea or wheezing     clobetasol (TEMOVATE) 0.05 % external ointment Apply topically 2 times daily For 2 weeks on 2 weeks off and repeat. External use only to vulva     levonorgestrel (MIRENA) 20 MCG/DAY IUD 1 each by Intrauterine route once     Multiple Vitamins-Calcium (DAILY VITAMINS FOR WOMEN PO) Take 1 tablet by mouth daily     EPINEPHrine (ANY BX GENERIC EQUIV) 0.3 MG/0.3ML injection 2-pack Inject 0.3 mLs (0.3 mg) into the muscle once as needed for anaphylaxis (Patient not taking:  Reported on 2/24/2023)     phenazopyridine (AZO URINARY PAIN RELIEF) 95 MG tablet Take 190 mg by mouth 2 times daily (Patient not taking: Reported on 2/24/2023)     predniSONE (DELTASONE) 20 MG tablet Take two tablets (= 40mg) each day for 5 (five) days (Patient not taking: Reported on 2/24/2023)     No current facility-administered medications for this visit.     No Known Allergies    ROS:  12 point review of systems negative other than symptoms noted below or in the HPI.  POSITIVE for:, dysuria, vaginal itching or burning      OBJECTIVE:     /78   There is no height or weight on file to calculate BMI.    Exam:  Constitutional:  Appearance: Well nourished, well developed alert, in no acute distress  Psychiatric:  Mentation appears normal and affect normal/bright.  Pelvic Exam:  External Genitalia:    Small amount of thin white discharge at the introitus  Vagina:     Normal vaginal vault without central or paravaginal defects, thick yellow discharge present, no inflammatory lesions present, no masses present  Bladder:     Nontender to palpation  Urethra:   Urethral Body:  Urethra palpation normal, urethra structural support normal   Urethral Meatus:  No erythema or lesions present-small amount of thin white discharge  Cervix:     Appearance healthy, no lesions present, nontender to palpation, no bleeding present.  IUD strings visualized  Uterus:     Uterus: firm, normal sized and nontender, anteverted in position.   Adnexa:     No adnexal tenderness present, no adnexal masses present  Perineum:     Hypopigmentation of the perineum bilaterally.  There is about 1/2 inch border on the both sides.  She has a small 4 mm linear skin tear on the inner right labia minora.  Anus:     Anus within normal limits, no hemorrhoids present  Inguinal Lymph Nodes:     No lymphadenopathy present  Pubic Hair:     Normal pubic hair distribution for age  Genitalia and Groin:     No rashes present, no lesions present, no areas of  discoloration, no masses present       In-Clinic Test Results:  Results for orders placed or performed in visit on 02/24/23 (from the past 24 hour(s))   UA Macro with Reflex to Micro and Culture - lab collect    Specimen: Urine, Midstream   Result Value Ref Range    Color Urine Yellow Colorless, Straw, Light Yellow, Yellow    Appearance Urine Clear Clear    Glucose Urine Negative Negative mg/dL    Bilirubin Urine Negative Negative    Ketones Urine Negative Negative mg/dL    Specific Gravity Urine 1.020 1.003 - 1.035    Blood Urine Trace (A) Negative    pH Urine 7.0 5.0 - 7.0    Protein Albumin Urine Negative Negative mg/dL    Urobilinogen Urine 0.2 0.2, 1.0 E.U./dL    Nitrite Urine Negative Negative    Leukocyte Esterase Urine Trace (A) Negative   Bacterial Vaginosis Smear    Specimen: Vagina; Swab    Narrative    The following orders were created for panel order Bacterial Vaginosis Smear.  Procedure                               Abnormality         Status                     ---------                               -----------         ------                     Bacterial Vaginosis Stain[[id number]]                                                     Please view results for these tests on the individual orders.       ASSESSMENT/PLAN:                                                        ICD-10-CM    1. Painful urination  R30.9 UA Macro with Reflex to Micro and Culture - lab collect     UA Macro with Reflex to Micro and Culture - lab collect     Urine Microscopic     Bacterial Vaginosis Smear     Yeast culture     Ureaplasma and Mycoplasma culture     Urine Culture Aerobic Bacterial - lab collect      2. Skin hypopigmentation  L81.9 clobetasol (TEMOVATE) 0.05 % external ointment          There are no Patient Instructions on file for this visit.    38-year-old female with probable lichen sclerosis of the vulva.  We will treat her with clobetasol.  Methods risk benefits were discussed.  We also encouraged her to sit in  a bathtub and soak.  She will use this on a 2-week on 2-week off basis.  Ureaplasma culture was obtained and sent.  We had a long discussion about being colonized with this bacteria as it is a normal bacteria in the tract.  Vaginal cultures will be sent.  Urine culture will be sent    EWA Galeano CNP [facility] FOR [facility]

## 2023-02-25 LAB — BACTERIA UR CULT: NO GROWTH

## 2023-02-27 ENCOUNTER — MYC MEDICAL ADVICE (OUTPATIENT)
Dept: OBGYN | Facility: CLINIC | Age: 38
End: 2023-02-27
Payer: COMMERCIAL

## 2023-02-27 DIAGNOSIS — N76.0 BACTERIAL VAGINOSIS: Primary | ICD-10-CM

## 2023-02-27 DIAGNOSIS — B96.89 BACTERIAL VAGINOSIS: Primary | ICD-10-CM

## 2023-02-27 RX ORDER — METRONIDAZOLE 500 MG/1
500 TABLET ORAL 2 TIMES DAILY
Qty: 14 TABLET | Refills: 0 | Status: SHIPPED | OUTPATIENT
Start: 2023-02-27

## 2023-02-27 NOTE — TELEPHONE ENCOUNTER
Bacterial Vaginosis Stain  Order: [id number] - Part of Panel Order [id number]   Status: Final result      Visible to patient: Yes (seen)      Dx: Painful urination     Specimen Information: Vagina; Swab    0 Result Notes          Component Ref Range & Units 3 d ago    Betito score 1 , 2 , 3 , 0  4 Abnormal     Comment: Intermediate; mixed morphotypes consistent with transition from normal vaginal brandon        Per Keturah S:Patient is positive for BV and should be treated with metrogel or oral flagyl.  I would use the oral, so she can continue to use the clobetasol ointment externally.  I will put in a prescription.  CONCHITA WallaceC

## 2023-02-28 LAB — BACTERIA SPEC CULT: NO GROWTH

## 2023-03-01 NOTE — TELEPHONE ENCOUNTER
2/27/23 Metronidazole Rx sent by Keturah Olivas NP on 2/27  Pt started tx as Rx'd on Monday.    Pt just rec'd result message from  that results negative and now is very confused.    Explaineded pt the situation - some providers tx slightly differently and the reason 2 answers were rec'd. Keturah answered on Monday when Cindy out of the office. Cindy answering today when returned to office unaware Keturah answered pt w tx plan. We will see what Cindy Grider NP says in the morning when returns to office. Pt agreeable to plan.    How should pt proceed -stop tx or continue? Why such a discrepancy?    Cindy Garcia, RN on 3/1/2023 at 4:06 PM

## 2023-03-03 LAB — BACTERIA SPEC CULT: NORMAL

## 2023-04-09 ENCOUNTER — HEALTH MAINTENANCE LETTER (OUTPATIENT)
Age: 38
End: 2023-04-09

## 2024-04-07 ENCOUNTER — HEALTH MAINTENANCE LETTER (OUTPATIENT)
Age: 39
End: 2024-04-07

## 2024-06-16 ENCOUNTER — HEALTH MAINTENANCE LETTER (OUTPATIENT)
Age: 39
End: 2024-06-16

## 2025-06-21 ENCOUNTER — HEALTH MAINTENANCE LETTER (OUTPATIENT)
Age: 40
End: 2025-06-21

## (undated) DEVICE — BARRIER INTERCEED 5X6" 4350XL

## (undated) DEVICE — PACK C-SECTION LF PL15OTA83B

## (undated) DEVICE — DRAPE SETUP C-SECTION INVISISHIELD 54X90" DYNJE5600

## (undated) DEVICE — GLOVE PROTEXIS BLUE W/NEU-THERA 6.5  2D73EB65

## (undated) DEVICE — SUCTION TIP YANKAUER W/O VENT K86

## (undated) DEVICE — SUCTION CANISTER MEDIVAC LINER 1500ML W/LID 65651-515

## (undated) DEVICE — SU MONOCRYL 0 CTB-1 36" YB946

## (undated) DEVICE — SPONGE LAP 18X18" X8435

## (undated) DEVICE — STOCKING SLEEVE COMPRESSION CALF LG

## (undated) DEVICE — BASIN SET MAJOR

## (undated) DEVICE — DRSG ABDOMINAL 07 1/2X8" 7197D

## (undated) DEVICE — CATH TRAY FOLEY 16FR SILICONE 907416

## (undated) DEVICE — SU VICRYL 0 CT-1 36" J346H

## (undated) DEVICE — SU VICRYL 3-0 SH 27" J316H

## (undated) DEVICE — GLOVE ESTEEM POWDER FREE SMT 6.5  2D72PT65

## (undated) DEVICE — SU VICRYL 3-0 CTX 36" UND J980H

## (undated) DEVICE — TUBING SUCTION 10'X3/16" N510

## (undated) DEVICE — SOL NACL 0.9% IRRIG 1000ML BOTTLE 07138-09

## (undated) DEVICE — SU MONOCRYL 4-0 PS-2 18" UND Y496G

## (undated) DEVICE — PREP CHLORAPREP 26ML TINTED ORANGE  260815

## (undated) DEVICE — SOL WATER IRRIG 1000ML BOTTLE 07139-09

## (undated) DEVICE — ESU GROUND PAD UNIVERSAL W/O CORD

## (undated) DEVICE — STRAP KNEE/BODY 31143004

## (undated) RX ORDER — FENTANYL CITRATE 50 UG/ML
INJECTION, SOLUTION INTRAMUSCULAR; INTRAVENOUS
Status: DISPENSED
Start: 2017-06-14

## (undated) RX ORDER — OXYTOCIN/0.9 % SODIUM CHLORIDE 30/500 ML
PLASTIC BAG, INJECTION (ML) INTRAVENOUS
Status: DISPENSED
Start: 2017-06-14

## (undated) RX ORDER — FENTANYL CITRATE 50 UG/ML
INJECTION, SOLUTION INTRAMUSCULAR; INTRAVENOUS
Status: DISPENSED
Start: 2017-06-13